# Patient Record
Sex: FEMALE | Race: WHITE | NOT HISPANIC OR LATINO | Employment: OTHER | URBAN - METROPOLITAN AREA
[De-identification: names, ages, dates, MRNs, and addresses within clinical notes are randomized per-mention and may not be internally consistent; named-entity substitution may affect disease eponyms.]

---

## 2017-01-01 ENCOUNTER — TELEPHONE (OUTPATIENT)
Dept: ORTHOPEDIC SURGERY | Facility: CLINIC | Age: 82
End: 2017-01-01

## 2017-01-01 ENCOUNTER — OFFICE VISIT (OUTPATIENT)
Dept: ORTHOPEDIC SURGERY | Facility: CLINIC | Age: 82
End: 2017-01-01

## 2017-01-01 VITALS — TEMPERATURE: 99.2 F | BODY MASS INDEX: 25.13 KG/M2 | WEIGHT: 128 LBS | HEIGHT: 60 IN

## 2017-01-01 VITALS — HEIGHT: 62 IN | BODY MASS INDEX: 23.74 KG/M2 | WEIGHT: 129 LBS

## 2017-01-01 VITALS — BODY MASS INDEX: 23.74 KG/M2 | TEMPERATURE: 99.2 F | HEIGHT: 62 IN | WEIGHT: 129 LBS

## 2017-01-01 DIAGNOSIS — M11.231 CHONDROCALCINOSIS OF RIGHT WRIST: ICD-10-CM

## 2017-01-01 DIAGNOSIS — S42.022D CLOSED DISPLACED FRACTURE OF SHAFT OF LEFT CLAVICLE WITH ROUTINE HEALING, SUBSEQUENT ENCOUNTER: Primary | ICD-10-CM

## 2017-01-01 DIAGNOSIS — S69.91XA HAND INJURY, RIGHT, INITIAL ENCOUNTER: Primary | ICD-10-CM

## 2017-01-01 DIAGNOSIS — S49.92XA INJURY OF LEFT CLAVICLE, INITIAL ENCOUNTER: Primary | ICD-10-CM

## 2017-01-01 PROCEDURE — 20605 DRAIN/INJ JOINT/BURSA W/O US: CPT | Performed by: ORTHOPAEDIC SURGERY

## 2017-01-01 PROCEDURE — 73130 X-RAY EXAM OF HAND: CPT | Performed by: ORTHOPAEDIC SURGERY

## 2017-01-01 PROCEDURE — 99213 OFFICE O/P EST LOW 20 MIN: CPT | Performed by: ORTHOPAEDIC SURGERY

## 2017-01-01 PROCEDURE — 73000 X-RAY EXAM OF COLLAR BONE: CPT | Performed by: ORTHOPAEDIC SURGERY

## 2017-01-01 PROCEDURE — 99214 OFFICE O/P EST MOD 30 MIN: CPT | Performed by: ORTHOPAEDIC SURGERY

## 2017-01-01 PROCEDURE — 23500 CLTX CLAVICULAR FX W/O MNPJ: CPT | Performed by: ORTHOPAEDIC SURGERY

## 2017-01-01 PROCEDURE — 99212 OFFICE O/P EST SF 10 MIN: CPT | Performed by: ORTHOPAEDIC SURGERY

## 2017-01-01 RX ORDER — METHYLPREDNISOLONE 4 MG/1
TABLET ORAL
Qty: 21 TABLET | Refills: 0 | Status: ON HOLD | OUTPATIENT
Start: 2017-01-01 | End: 2018-01-01

## 2017-01-01 RX ORDER — LIDOCAINE HYDROCHLORIDE 10 MG/ML
1 INJECTION, SOLUTION INFILTRATION; PERINEURAL
Status: COMPLETED | OUTPATIENT
Start: 2017-01-01 | End: 2017-01-01

## 2017-01-01 RX ORDER — BUPIVACAINE HYDROCHLORIDE 5 MG/ML
1 INJECTION, SOLUTION PERINEURAL
Status: COMPLETED | OUTPATIENT
Start: 2017-01-01 | End: 2017-01-01

## 2017-01-01 RX ORDER — METHYLPREDNISOLONE ACETATE 80 MG/ML
80 INJECTION, SUSPENSION INTRA-ARTICULAR; INTRALESIONAL; INTRAMUSCULAR; SOFT TISSUE
Status: COMPLETED | OUTPATIENT
Start: 2017-01-01 | End: 2017-01-01

## 2017-01-01 RX ORDER — METHYLPREDNISOLONE 4 MG/1
TABLET ORAL
Qty: 21 TABLET | Refills: 0 | Status: SHIPPED | OUTPATIENT
Start: 2017-01-01 | End: 2017-01-01 | Stop reason: SDUPTHER

## 2017-01-01 RX ADMIN — METHYLPREDNISOLONE ACETATE 80 MG: 80 INJECTION, SUSPENSION INTRA-ARTICULAR; INTRALESIONAL; INTRAMUSCULAR; SOFT TISSUE at 14:21

## 2017-01-01 RX ADMIN — BUPIVACAINE HYDROCHLORIDE 1 ML: 5 INJECTION, SOLUTION PERINEURAL at 14:21

## 2017-01-01 RX ADMIN — LIDOCAINE HYDROCHLORIDE 1 ML: 10 INJECTION, SOLUTION INFILTRATION; PERINEURAL at 14:21

## 2017-02-13 ENCOUNTER — TRANSCRIBE ORDERS (OUTPATIENT)
Dept: ADMINISTRATIVE | Facility: HOSPITAL | Age: 82
End: 2017-02-13

## 2017-02-13 DIAGNOSIS — R01.1 SYSTOLIC MURMUR: Primary | ICD-10-CM

## 2017-03-15 ENCOUNTER — HOSPITAL ENCOUNTER (OUTPATIENT)
Dept: CARDIOLOGY | Facility: HOSPITAL | Age: 82
Discharge: HOME | End: 2017-03-15
Attending: INTERNAL MEDICINE | Admitting: INTERNAL MEDICINE

## 2017-03-15 VITALS
BODY MASS INDEX: 24.84 KG/M2 | SYSTOLIC BLOOD PRESSURE: 170 MMHG | WEIGHT: 135 LBS | DIASTOLIC BLOOD PRESSURE: 90 MMHG | HEART RATE: 71 BPM | HEIGHT: 62 IN

## 2017-03-15 DIAGNOSIS — R01.1 SYSTOLIC MURMUR: ICD-10-CM

## 2017-03-15 LAB
ASCENDING AORTA: 2.7 CM
BH CV ECHO MEAS - ACS: 1.8 CM
BH CV ECHO MEAS - AO MAX PG (FULL): 1.8 MMHG
BH CV ECHO MEAS - AO MAX PG: 7.7 MMHG
BH CV ECHO MEAS - AO MEAN PG (FULL): 0.96 MMHG
BH CV ECHO MEAS - AO MEAN PG: 4.3 MMHG
BH CV ECHO MEAS - AO ROOT AREA (BSA CORRECTED): 1.8
BH CV ECHO MEAS - AO ROOT AREA: 6.9 CM^2
BH CV ECHO MEAS - AO ROOT DIAM: 3 CM
BH CV ECHO MEAS - AO V2 MAX: 139 CM/SEC
BH CV ECHO MEAS - AO V2 MEAN: 96.5 CM/SEC
BH CV ECHO MEAS - AO V2 VTI: 27.8 CM
BH CV ECHO MEAS - AVA(I,A): 2.1 CM^2
BH CV ECHO MEAS - AVA(I,D): 2.1 CM^2
BH CV ECHO MEAS - AVA(V,A): 2.2 CM^2
BH CV ECHO MEAS - AVA(V,D): 2.2 CM^2
BH CV ECHO MEAS - BSA(HAYCOCK): 1.6 M^2
BH CV ECHO MEAS - BSA: 1.6 M^2
BH CV ECHO MEAS - BZI_BMI: 24.7 KILOGRAMS/M^2
BH CV ECHO MEAS - BZI_METRIC_HEIGHT: 157.5 CM
BH CV ECHO MEAS - BZI_METRIC_WEIGHT: 61.2 KG
BH CV ECHO MEAS - CONTRAST EF (2CH): 65.7 ML/M^2
BH CV ECHO MEAS - CONTRAST EF 4CH: 62.9 ML/M^2
BH CV ECHO MEAS - EDV(MOD-SP2): 70 ML
BH CV ECHO MEAS - EDV(MOD-SP4): 62 ML
BH CV ECHO MEAS - EDV(TEICH): 92.9 ML
BH CV ECHO MEAS - EF(CUBED): 71.4 %
BH CV ECHO MEAS - EF(MOD-SP2): 65.7 %
BH CV ECHO MEAS - EF(MOD-SP4): 62.9 %
BH CV ECHO MEAS - EF(TEICH): 63.2 %
BH CV ECHO MEAS - ESV(MOD-SP2): 24 ML
BH CV ECHO MEAS - ESV(MOD-SP4): 23 ML
BH CV ECHO MEAS - ESV(TEICH): 34.2 ML
BH CV ECHO MEAS - FS: 34.1 %
BH CV ECHO MEAS - IVS/LVPW: 1
BH CV ECHO MEAS - IVSD: 0.97 CM
BH CV ECHO MEAS - LAT PEAK E' VEL: 5 CM/SEC
BH CV ECHO MEAS - LV DIASTOLIC VOL/BSA (35-75): 38.3 ML/M^2
BH CV ECHO MEAS - LV MASS(C)D: 143.1 GRAMS
BH CV ECHO MEAS - LV MASS(C)DI: 88.5 GRAMS/M^2
BH CV ECHO MEAS - LV MAX PG: 5.9 MMHG
BH CV ECHO MEAS - LV MEAN PG: 3.3 MMHG
BH CV ECHO MEAS - LV SYSTOLIC VOL/BSA (12-30): 14.2 ML/M^2
BH CV ECHO MEAS - LV V1 MAX: 121.7 CM/SEC
BH CV ECHO MEAS - LV V1 MEAN: 88.1 CM/SEC
BH CV ECHO MEAS - LV V1 VTI: 23.3 CM
BH CV ECHO MEAS - LVIDD: 4.5 CM
BH CV ECHO MEAS - LVIDS: 3 CM
BH CV ECHO MEAS - LVLD AP2: 7.4 CM
BH CV ECHO MEAS - LVLD AP4: 6.3 CM
BH CV ECHO MEAS - LVLS AP2: 5.7 CM
BH CV ECHO MEAS - LVLS AP4: 5.3 CM
BH CV ECHO MEAS - LVOT AREA (M): 2.5 CM^2
BH CV ECHO MEAS - LVOT AREA: 2.5 CM^2
BH CV ECHO MEAS - LVOT DIAM: 1.8 CM
BH CV ECHO MEAS - LVPWD: 0.93 CM
BH CV ECHO MEAS - MED PEAK E' VEL: 5 CM/SEC
BH CV ECHO MEAS - MR MAX PG: 39.9 MMHG
BH CV ECHO MEAS - MR MAX VEL: 315.8 CM/SEC
BH CV ECHO MEAS - MV A DUR: 0.11 SEC
BH CV ECHO MEAS - MV A MAX VEL: 109.1 CM/SEC
BH CV ECHO MEAS - MV DEC SLOPE: 208.4 CM/SEC^2
BH CV ECHO MEAS - MV DEC TIME: 0.25 SEC
BH CV ECHO MEAS - MV E MAX VEL: 57.7 CM/SEC
BH CV ECHO MEAS - MV E/A: 0.53
BH CV ECHO MEAS - MV MAX PG: 7.1 MMHG
BH CV ECHO MEAS - MV MEAN PG: 2 MMHG
BH CV ECHO MEAS - MV P1/2T MAX VEL: 59.2 CM/SEC
BH CV ECHO MEAS - MV P1/2T: 83.1 MSEC
BH CV ECHO MEAS - MV V2 MAX: 133.6 CM/SEC
BH CV ECHO MEAS - MV V2 MEAN: 61.5 CM/SEC
BH CV ECHO MEAS - MV V2 VTI: 28.6 CM
BH CV ECHO MEAS - MVA P1/2T LCG: 3.7 CM^2
BH CV ECHO MEAS - MVA(P1/2T): 2.6 CM^2
BH CV ECHO MEAS - MVA(VTI): 2 CM^2
BH CV ECHO MEAS - PA ACC TIME: 0.14 SEC
BH CV ECHO MEAS - PA MAX PG (FULL): 1.4 MMHG
BH CV ECHO MEAS - PA MAX PG: 4.7 MMHG
BH CV ECHO MEAS - PA PR(ACCEL): 17.2 MMHG
BH CV ECHO MEAS - PA V2 MAX: 108.6 CM/SEC
BH CV ECHO MEAS - PULM A REVS DUR: 0.09 SEC
BH CV ECHO MEAS - PULM A REVS VEL: 42.2 CM/SEC
BH CV ECHO MEAS - PULM DIAS VEL: 38.3 CM/SEC
BH CV ECHO MEAS - PULM S/D: 1.2
BH CV ECHO MEAS - PULM SYS VEL: 44.1 CM/SEC
BH CV ECHO MEAS - PVA(V,A): 2.4 CM^2
BH CV ECHO MEAS - PVA(V,D): 2.4 CM^2
BH CV ECHO MEAS - QP/QS: 0.9
BH CV ECHO MEAS - RAP SYSTOLE: 3 MMHG
BH CV ECHO MEAS - RV MAX PG: 3.3 MMHG
BH CV ECHO MEAS - RV MEAN PG: 1.9 MMHG
BH CV ECHO MEAS - RV V1 MAX: 91.2 CM/SEC
BH CV ECHO MEAS - RV V1 MEAN: 65.7 CM/SEC
BH CV ECHO MEAS - RV V1 VTI: 18.5 CM
BH CV ECHO MEAS - RVOT AREA: 2.8 CM^2
BH CV ECHO MEAS - RVOT DIAM: 1.9 CM
BH CV ECHO MEAS - RVSP: 28 MMHG
BH CV ECHO MEAS - SI(AO): 118.4 ML/M^2
BH CV ECHO MEAS - SI(CUBED): 40.5 ML/M^2
BH CV ECHO MEAS - SI(LVOT): 35.6 ML/M^2
BH CV ECHO MEAS - SI(MOD-SP2): 28.4 ML/M^2
BH CV ECHO MEAS - SI(MOD-SP4): 24.1 ML/M^2
BH CV ECHO MEAS - SI(TEICH): 36.3 ML/M^2
BH CV ECHO MEAS - SUP REN AO DIAM: 1.7 CM
BH CV ECHO MEAS - SV(AO): 191.6 ML
BH CV ECHO MEAS - SV(CUBED): 65.5 ML
BH CV ECHO MEAS - SV(LVOT): 57.5 ML
BH CV ECHO MEAS - SV(MOD-SP2): 46 ML
BH CV ECHO MEAS - SV(MOD-SP4): 39 ML
BH CV ECHO MEAS - SV(RVOT): 52.1 ML
BH CV ECHO MEAS - SV(TEICH): 58.8 ML
BH CV ECHO MEAS - TAPSE (>1.6): 2.2 CM2
BH CV ECHO MEAS - TR MAX VEL: 251.1 CM/SEC
BH CV XLRA - RV BASE: 2.4 CM
BH CV XLRA - TDI S': 23 CM/SEC
E/E' RATIO: 11.5
LEFT ATRIUM VOLUME INDEX: 17 ML/M2
LV EF 2D ECHO EST: 63 %
SINUS: 2.8 CM
STJ: 2.7 CM

## 2017-03-15 PROCEDURE — 93306 TTE W/DOPPLER COMPLETE: CPT

## 2017-03-15 PROCEDURE — 93306 TTE W/DOPPLER COMPLETE: CPT | Performed by: INTERNAL MEDICINE

## 2017-03-22 ENCOUNTER — HOSPITAL ENCOUNTER (OUTPATIENT)
Facility: HOSPITAL | Age: 82
Setting detail: OBSERVATION
LOS: 1 days | Discharge: HOME | End: 2017-03-24
Attending: EMERGENCY MEDICINE | Admitting: INTERNAL MEDICINE

## 2017-03-22 ENCOUNTER — APPOINTMENT (OUTPATIENT)
Dept: GENERAL RADIOLOGY | Facility: HOSPITAL | Age: 82
End: 2017-03-22

## 2017-03-22 ENCOUNTER — APPOINTMENT (OUTPATIENT)
Dept: CT IMAGING | Facility: HOSPITAL | Age: 82
End: 2017-03-22

## 2017-03-22 DIAGNOSIS — S72.145A CLOSED NONDISPLACED INTERTROCHANTERIC FRACTURE OF LEFT FEMUR, INITIAL ENCOUNTER (HCC): ICD-10-CM

## 2017-03-22 DIAGNOSIS — S32.592A CLOSED FRACTURE OF MULTIPLE PUBIC RAMI, LEFT, INITIAL ENCOUNTER (HCC): Primary | ICD-10-CM

## 2017-03-22 PROBLEM — T40.605A OPIATES AND RELATED NARCOTICS CAUSING ADVERSE EFFECT: Status: ACTIVE | Noted: 2017-03-22
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
## 2017-03-22 PROBLEM — Z79.01 CHRONIC ANTICOAGULATION: Status: ACTIVE | Noted: 2017-03-22

## 2017-03-22 PROBLEM — Z86.718 HISTORY OF DVT (DEEP VEIN THROMBOSIS): Status: ACTIVE | Noted: 2017-03-22

## 2017-03-22 LAB
ABO GROUP BLD: NORMAL
ALBUMIN SERPL-MCNC: 3.9 G/DL (ref 3.5–5.2)
ALBUMIN/GLOB SERPL: 1.6 G/DL
ALP SERPL-CCNC: 103 U/L (ref 39–117)
ALT SERPL W P-5'-P-CCNC: 20 U/L (ref 1–33)
ANION GAP SERPL CALCULATED.3IONS-SCNC: 15.7 MMOL/L
AST SERPL-CCNC: 22 U/L (ref 1–32)
BASOPHILS # BLD AUTO: 0.02 10*3/MM3 (ref 0–0.2)
BASOPHILS NFR BLD AUTO: 0.3 % (ref 0–1.5)
BILIRUB SERPL-MCNC: <0.2 MG/DL (ref 0.1–1.2)
BILIRUB UR QL STRIP: NEGATIVE
BLD GP AB SCN SERPL QL: NEGATIVE
BUN BLD-MCNC: 15 MG/DL (ref 8–23)
BUN/CREAT SERPL: 23.1 (ref 7–25)
CALCIUM SPEC-SCNC: 9.2 MG/DL (ref 8.6–10.5)
CHLORIDE SERPL-SCNC: 102 MMOL/L (ref 98–107)
CLARITY UR: ABNORMAL
CO2 SERPL-SCNC: 26.3 MMOL/L (ref 22–29)
COLOR UR: YELLOW
CREAT BLD-MCNC: 0.65 MG/DL (ref 0.57–1)
DEPRECATED RDW RBC AUTO: 58.2 FL (ref 37–54)
EOSINOPHIL # BLD AUTO: 0.08 10*3/MM3 (ref 0–0.7)
EOSINOPHIL NFR BLD AUTO: 1 % (ref 0.3–6.2)
ERYTHROCYTE [DISTWIDTH] IN BLOOD BY AUTOMATED COUNT: 17.2 % (ref 11.7–13)
GFR SERPL CREATININE-BSD FRML MDRD: 86 ML/MIN/1.73
GLOBULIN UR ELPH-MCNC: 2.4 GM/DL
GLUCOSE BLD-MCNC: 130 MG/DL (ref 65–99)
GLUCOSE UR STRIP-MCNC: NEGATIVE MG/DL
HCT VFR BLD AUTO: 39 % (ref 35.6–45.5)
HGB BLD-MCNC: 12.6 G/DL (ref 11.9–15.5)
HGB UR QL STRIP.AUTO: NEGATIVE
HOLD SPECIMEN: NORMAL
HOLD SPECIMEN: NORMAL
IMM GRANULOCYTES # BLD: 0 10*3/MM3 (ref 0–0.03)
IMM GRANULOCYTES NFR BLD: 0 % (ref 0–0.5)
INR PPP: 2.61 (ref 0.9–1.1)
KETONES UR QL STRIP: NEGATIVE
LEUKOCYTE ESTERASE UR QL STRIP.AUTO: NEGATIVE
LYMPHOCYTES # BLD AUTO: 1.54 10*3/MM3 (ref 0.9–4.8)
LYMPHOCYTES NFR BLD AUTO: 19.3 % (ref 19.6–45.3)
MCH RBC QN AUTO: 29.9 PG (ref 26.9–32)
MCHC RBC AUTO-ENTMCNC: 32.3 G/DL (ref 32.4–36.3)
MCV RBC AUTO: 92.6 FL (ref 80.5–98.2)
MONOCYTES # BLD AUTO: 0.89 10*3/MM3 (ref 0.2–1.2)
MONOCYTES NFR BLD AUTO: 11.2 % (ref 5–12)
NEUTROPHILS # BLD AUTO: 5.43 10*3/MM3 (ref 1.9–8.1)
NEUTROPHILS NFR BLD AUTO: 68.2 % (ref 42.7–76)
NITRITE UR QL STRIP: NEGATIVE
PH UR STRIP.AUTO: 8 [PH] (ref 5–8)
PLATELET # BLD AUTO: 457 10*3/MM3 (ref 140–500)
PMV BLD AUTO: 9.6 FL (ref 6–12)
POTASSIUM BLD-SCNC: 3.9 MMOL/L (ref 3.5–5.2)
PROT SERPL-MCNC: 6.3 G/DL (ref 6–8.5)
PROT UR QL STRIP: NEGATIVE
PROTHROMBIN TIME: 27.1 SECONDS (ref 11.7–14.2)
RBC # BLD AUTO: 4.21 10*6/MM3 (ref 3.9–5.2)
RH BLD: POSITIVE
SODIUM BLD-SCNC: 144 MMOL/L (ref 136–145)
SP GR UR STRIP: 1.01 (ref 1–1.03)
UROBILINOGEN UR QL STRIP: ABNORMAL
WBC NRBC COR # BLD: 7.96 10*3/MM3 (ref 4.5–10.7)
WHOLE BLOOD HOLD SPECIMEN: NORMAL
WHOLE BLOOD HOLD SPECIMEN: NORMAL

## 2017-03-22 PROCEDURE — 73502 X-RAY EXAM HIP UNI 2-3 VIEWS: CPT

## 2017-03-22 PROCEDURE — 81003 URINALYSIS AUTO W/O SCOPE: CPT | Performed by: INTERNAL MEDICINE

## 2017-03-22 PROCEDURE — 51702 INSERT TEMP BLADDER CATH: CPT

## 2017-03-22 PROCEDURE — 80053 COMPREHEN METABOLIC PANEL: CPT | Performed by: EMERGENCY MEDICINE

## 2017-03-22 PROCEDURE — 93005 ELECTROCARDIOGRAM TRACING: CPT | Performed by: INTERNAL MEDICINE

## 2017-03-22 PROCEDURE — 25010000002 VITAMIN K1 1 MG/1 ML SOLUTION: Performed by: ORTHOPAEDIC SURGERY

## 2017-03-22 PROCEDURE — 99283 EMERGENCY DEPT VISIT LOW MDM: CPT

## 2017-03-22 PROCEDURE — 94799 UNLISTED PULMONARY SVC/PX: CPT

## 2017-03-22 PROCEDURE — 86900 BLOOD TYPING SEROLOGIC ABO: CPT | Performed by: ORTHOPAEDIC SURGERY

## 2017-03-22 PROCEDURE — 93010 ELECTROCARDIOGRAM REPORT: CPT | Performed by: INTERNAL MEDICINE

## 2017-03-22 PROCEDURE — 85025 COMPLETE CBC W/AUTO DIFF WBC: CPT | Performed by: EMERGENCY MEDICINE

## 2017-03-22 PROCEDURE — 36415 COLL VENOUS BLD VENIPUNCTURE: CPT | Performed by: EMERGENCY MEDICINE

## 2017-03-22 PROCEDURE — 70450 CT HEAD/BRAIN W/O DYE: CPT

## 2017-03-22 PROCEDURE — 86901 BLOOD TYPING SEROLOGIC RH(D): CPT | Performed by: ORTHOPAEDIC SURGERY

## 2017-03-22 PROCEDURE — 86850 RBC ANTIBODY SCREEN: CPT | Performed by: ORTHOPAEDIC SURGERY

## 2017-03-22 PROCEDURE — 85610 PROTHROMBIN TIME: CPT | Performed by: EMERGENCY MEDICINE

## 2017-03-22 PROCEDURE — 71010 HC CHEST PA OR AP: CPT

## 2017-03-22 PROCEDURE — G0378 HOSPITAL OBSERVATION PER HR: HCPCS

## 2017-03-22 RX ORDER — PHYTONADIONE 2 MG/ML
5 INJECTION, EMULSION INTRAMUSCULAR; INTRAVENOUS; SUBCUTANEOUS ONCE
Status: COMPLETED | OUTPATIENT
Start: 2017-03-22 | End: 2017-03-22

## 2017-03-22 RX ORDER — LANOLIN ALCOHOL/MO/W.PET/CERES
500 CREAM (GRAM) TOPICAL DAILY
Status: DISCONTINUED | OUTPATIENT
Start: 2017-03-23 | End: 2017-03-24 | Stop reason: HOSPADM

## 2017-03-22 RX ORDER — FLUOXETINE HYDROCHLORIDE 20 MG/1
20 CAPSULE ORAL 3 TIMES WEEKLY
Status: DISCONTINUED | OUTPATIENT
Start: 2017-03-23 | End: 2017-03-24 | Stop reason: HOSPADM

## 2017-03-22 RX ORDER — ONDANSETRON 2 MG/ML
4 INJECTION INTRAMUSCULAR; INTRAVENOUS EVERY 6 HOURS PRN
Status: DISCONTINUED | OUTPATIENT
Start: 2017-03-22 | End: 2017-03-24 | Stop reason: HOSPADM

## 2017-03-22 RX ORDER — TRIAMCINOLONE ACETONIDE 0.1 %
PASTE (GRAM) DENTAL 2 TIMES DAILY
Status: DISCONTINUED | OUTPATIENT
Start: 2017-03-22 | End: 2017-03-24 | Stop reason: HOSPADM

## 2017-03-22 RX ORDER — HYDROCODONE BITARTRATE AND ACETAMINOPHEN 7.5; 325 MG/1; MG/1
1 TABLET ORAL EVERY 6 HOURS PRN
Status: DISCONTINUED | OUTPATIENT
Start: 2017-03-22 | End: 2017-03-22

## 2017-03-22 RX ORDER — TRAMADOL HYDROCHLORIDE 50 MG/1
50 TABLET ORAL EVERY 6 HOURS PRN
Status: DISCONTINUED | OUTPATIENT
Start: 2017-03-22 | End: 2017-03-24 | Stop reason: HOSPADM

## 2017-03-22 RX ORDER — BUPROPION HYDROCHLORIDE 75 MG/1
75 TABLET ORAL 3 TIMES DAILY
Status: DISCONTINUED | OUTPATIENT
Start: 2017-03-22 | End: 2017-03-24 | Stop reason: HOSPADM

## 2017-03-22 RX ORDER — HYDROCODONE BITARTRATE AND ACETAMINOPHEN 5; 325 MG/1; MG/1
1 TABLET ORAL EVERY 6 HOURS PRN
Status: DISCONTINUED | OUTPATIENT
Start: 2017-03-22 | End: 2017-03-24 | Stop reason: HOSPADM

## 2017-03-22 RX ORDER — SODIUM CHLORIDE 0.9 % (FLUSH) 0.9 %
1-10 SYRINGE (ML) INJECTION AS NEEDED
Status: DISCONTINUED | OUTPATIENT
Start: 2017-03-22 | End: 2017-03-24 | Stop reason: HOSPADM

## 2017-03-22 RX ORDER — BUPROPION HYDROCHLORIDE 75 MG/1
75 TABLET ORAL 3 TIMES DAILY
COMMUNITY

## 2017-03-22 RX ORDER — OLANZAPINE 5 MG/1
5 TABLET ORAL NIGHTLY
Status: DISCONTINUED | OUTPATIENT
Start: 2017-03-22 | End: 2017-03-24 | Stop reason: HOSPADM

## 2017-03-22 RX ORDER — MORPHINE SULFATE 2 MG/ML
2 INJECTION, SOLUTION INTRAMUSCULAR; INTRAVENOUS
Status: DISCONTINUED | OUTPATIENT
Start: 2017-03-22 | End: 2017-03-24 | Stop reason: HOSPADM

## 2017-03-22 RX ORDER — MULTIPLE VITAMINS W/ MINERALS TAB 9MG-400MCG
1 TAB ORAL DAILY
Status: DISCONTINUED | OUTPATIENT
Start: 2017-03-22 | End: 2017-03-24 | Stop reason: HOSPADM

## 2017-03-22 RX ORDER — LORAZEPAM 0.5 MG/1
0.5 TABLET ORAL EVERY 8 HOURS PRN
Status: DISCONTINUED | OUTPATIENT
Start: 2017-03-22 | End: 2017-03-24 | Stop reason: HOSPADM

## 2017-03-22 RX ORDER — WARFARIN SODIUM 7.5 MG/1
7.5 TABLET ORAL
Status: ON HOLD | COMMUNITY
End: 2018-01-01

## 2017-03-22 RX ORDER — ACETAMINOPHEN 325 MG/1
650 TABLET ORAL EVERY 6 HOURS PRN
Status: DISCONTINUED | OUTPATIENT
Start: 2017-03-22 | End: 2017-03-24 | Stop reason: HOSPADM

## 2017-03-22 RX ADMIN — OLANZAPINE 5 MG: 5 TABLET, FILM COATED ORAL at 21:09

## 2017-03-22 RX ADMIN — LORAZEPAM 0.5 MG: 0.5 TABLET ORAL at 21:09

## 2017-03-22 RX ADMIN — TRAMADOL HYDROCHLORIDE 50 MG: 50 TABLET, FILM COATED ORAL at 23:03

## 2017-03-22 RX ADMIN — PHYTONADIONE 5 MG: 1 INJECTION, EMULSION INTRAMUSCULAR; INTRAVENOUS; SUBCUTANEOUS at 15:13

## 2017-03-22 RX ADMIN — MULTIPLE VITAMINS W/ MINERALS TAB 1 TABLET: TAB at 21:08

## 2017-03-22 NOTE — ED NOTES
Pt c/o right groin pain after falling around 1700 yesterday. Pt said she was walking inside of her house and just fell. Pt does not know why she fell. Pt states she did not pass out. Pt states she did hit her head but denies LOC.     Freida Lezama RN  03/22/17 7420

## 2017-03-22 NOTE — H&P
Patient Care Team:  Jerome Moore Jr., MD as PCP - General (Internal Medicine)    Chief complaint: left hip pain    Subjective     Fall     Hip Pain    Incident onset: last night. The incident occurred at home. The injury mechanism was a fall. The pain is present in the left hip. The pain is moderate. Associated symptoms include an inability to bear weight and a loss of motion. The symptoms are aggravated by weight bearing and movement. She has tried rest for the symptoms. The treatment provided mild relief.   Came to [facility] ER and found to have hip fx and has been admitted.     Review of Systems   Constitutional: Negative.    HENT: Negative.    Eyes: Negative.    Respiratory: Negative.    Cardiovascular: Negative for chest pain, palpitations and leg swelling.   Gastrointestinal: Negative.    Endocrine: Negative.    Genitourinary: Negative.    Musculoskeletal: Positive for arthralgias and joint swelling.   Skin: Negative.    Allergic/Immunologic: Negative.    Neurological: Negative.    Hematological: Negative.    Psychiatric/Behavioral: Negative.     Ext- no leg swelling, + history of blood clots    Past Medical History:   Diagnosis Date   • Arthritis of back    • Arthritis of spine    • Back pain    • Depression    • Hx of blood clots     2 seperate episoides. Last one over 20 years ago. Also s/p IVC filter     Past Surgical History:   Procedure Laterality Date   • BREAST SURGERY     • CATARACT EXTRACTION     • GALLBLADDER SURGERY     • HYSTERECTOMY     • JOINT REPLACEMENT  2014    LEFT   • KNEE ARTHROPLASTY, PARTIAL REPLACEMENT     • KYPHOPLASTY N/A 4/25/2016    Procedure: L1 kyphoplasty;  Surgeon: Jd Ibrahim MD;  Location: [facility] 18/19;  Service:    • VENA CAVA FILTER INSERTION       Family History   Problem Relation Age of Onset   • No Known Problems Mother    • Deep vein thrombosis Neg Hx      Social History   Substance Use Topics   • Smoking status: Never Smoker   • Smokeless tobacco:  Never Used   • Alcohol use No     Prescriptions Prior to Admission   Medication Sig Dispense Refill Last Dose   • Acetaminophen (TYLENOL PO) Take  by mouth.   Taking   • buPROPion (WELLBUTRIN) 75 MG tablet Take 75 mg by mouth 3 (Three) Times a Day.      • Cholecalciferol (VITAMIN D PO) Take  by mouth.   Taking   • FLUoxetine (PROzac) 20 MG tablet Take 20 mg by mouth daily. TAKES 3DAYS WEEKLY(MWF)   4/24/2016 at 0900   • furosemide (LASIX) 20 MG tablet Take 20 mg by mouth daily.   Taking   • L-Methylfolate-Q45-F0-A4 (CEREFOLIN PO) Take 1 tablet by mouth daily.   Taking   • LORazepam (ATIVAN) 0.5 MG tablet Take 0.5 mg by mouth every 8 (eight) hours as needed for anxiety.   Taking   • Multiple Vitamins-Minerals (CENTRUM ADULTS PO) Take 1 tablet by mouth daily.   Taking   • NIACIN CR PO Take 1 tablet by mouth 3 (three) times a day before meals.   Taking   • OLANZapine (ZYPREXA) 5 MG tablet Take 5 mg by mouth every night.   Taking   • triamcinolone (KENALOG) 0.1 % paste Apply sparingly to the gums 2 times a day. 5 g 2    • warfarin (COUMADIN) 5 MG tablet Take 5 mg by mouth Daily. On Tues, Wed, Thurs, Fri, Sat, & Sun   Taking   • warfarin (COUMADIN) 7.5 MG tablet Take 7.5 mg by mouth Daily. On Monday only      • buPROPion SR (WELLBUTRIN SR) 150 MG 12 hr tablet Take 150 mg by mouth 2 (two) times a day.   Taking     Allergies:  Review of patient's allergies indicates no known allergies.    Objective      Vital Signs  Temp:  [98.7 °F (37.1 °C)] 98.7 °F (37.1 °C)  Heart Rate:  [59-65] 61  Resp:  [16] 16  BP: (140-177)/(68-77) 177/77    Physical Exam   Constitutional: She is oriented to person, place, and time. She appears well-developed and well-nourished. No distress.   HENT:   Head: Normocephalic and atraumatic.   Mouth/Throat: Oropharynx is clear and moist.   Eyes: EOM are normal. Pupils are equal, round, and reactive to light. No scleral icterus.   Neck: Normal range of motion. Neck supple.   Cardiovascular: Normal rate,  regular rhythm and normal heart sounds.    No murmur heard.  Pulmonary/Chest: Effort normal and breath sounds normal.   Abdominal: Soft. Bowel sounds are normal. She exhibits no distension. There is no tenderness.   Genitourinary:   Genitourinary Comments: Deferred    Musculoskeletal: She exhibits deformity (left leg externally rotated and shortened). She exhibits no edema.   Neurological: She is alert and oriented to person, place, and time. No cranial nerve deficit.   Skin: Skin is warm and dry. No rash noted.   Psychiatric: She has a normal mood and affect. Her behavior is normal. Thought content normal.   Nursing note and vitals reviewed.      Results Review:   I reviewed the patient's new clinical results.    Urinalysis With / Culture If Indicated [[id number]]         Lab Status: No result Specimen: Urine from Urine, Clean Catch        Type & Screen [[id number]] Collected: 03/22/17 1431       Lab Status: Edited Result - FINAL Specimen: Blood Updated: 03/22/17 1534        ABO Type O        RH type Positive        Antibody Screen Negative       Protime-INR [[id number]] (Abnormal) Collected: 03/22/17 1030       Lab Status: Final result Specimen: Blood Updated: 03/22/17 1103        Protime 27.1 (H) Seconds         INR 2.61 (H)       [place] Draw [[id number]] Collected: 03/22/17 1030       Lab Status: Final result Specimen: Blood Updated: 03/22/17 1501       Narrative:         The following orders were created for panel order [place] Draw.  Procedure                               Abnormality         Status                     ---------                               -----------         ------                     Light Blue Top[[id number]]                                    Final result               Green Top (Gel)[[id number]]                                   Final result               Lavender Top[[id number]]                                      Final result               Gold Top - SST[[id number]]                                     Final result                 Please view results for these tests on the individual orders.       Comprehensive Metabolic Panel [[id number]] (Abnormal) Collected: 03/22/17 1030       Lab Status: Final result Specimen: Blood Updated: 03/22/17 1240        Glucose 130 (H) mg/dL         BUN 15 mg/dL         Creatinine 0.65 mg/dL         Sodium 144 mmol/L         Potassium 3.9 mmol/L         Chloride 102 mmol/L         CO2 26.3 mmol/L         Calcium 9.2 mg/dL         Total Protein 6.3 g/dL         Albumin 3.90 g/dL         ALT (SGPT) 20 U/L         AST (SGOT) 22 U/L         Alkaline Phosphatase 103 U/L         Total Bilirubin <0.2 mg/dL         eGFR Non African Amer 86 mL/min/1.73         Globulin 2.4 gm/dL         A/G Ratio 1.6 g/dL         BUN/Creatinine Ratio 23.1        Anion Gap 15.7 mmol/L        Narrative:         The MDRD GFR formula is only valid for adults with stable renal function between ages 18 and 70.       CBC Auto Differential [[id number]] (Abnormal) Collected: 03/22/17 1030       Lab Status: Final result Specimen: Blood Updated: 03/22/17 1220        WBC 7.96 10*3/mm3         RBC 4.21 10*6/mm3         Hemoglobin 12.6 g/dL         Hematocrit 39.0 %         MCV 92.6 fL         MCH 29.9 pg         MCHC 32.3 (L) g/dL         RDW 17.2 (H) %         RDW-SD 58.2 (H) fl         MPV 9.6 fL         Platelets 457 10*3/mm3         Neutrophil % 68.2 %         Lymphocyte % 19.3 (L) %         Monocyte % 11.2 %         Eosinophil % 1.0 %         Basophil % 0.3 %         Immature Grans % 0.0 %         Neutrophils, Absolute 5.43 10*3/mm3         Lymphocytes, Absolute 1.54 10*3/mm3         Monocytes, Absolute 0.89 10*3/mm3         Eosinophils, Absolute 0.08 10*3/mm3         Basophils, Absolute 0.02 10*3/mm3         Immature Grans, Absolute 0.00 10*3/mm3             XR Chest 1 View [[id number]] Not Reviewed       Order Status: Completed Collected: 03/22/17 1352        Updated: 03/22/17 1701       Narrative:         XR CHEST 1  VW-, XR HIP W OR WO PELVIS 2-3 VIEW LEFT-     HISTORY: 86-year-old female status post fall. Left hip pain. Chest pain.     FINDINGS:   LEFT HIP: There is a subtle linear lucency in the intertrochanteric  region of the left femur which likely represents a nondisplaced  fracture. No acute pelvic fracture is seen. There are moderately  extensive osteoarthritic changes at both hip joints.     CHEST: There are no pulmonary airspace consolidations to suggest  pneumonia and there is no convincing evidence for CHF.         CT Head Without Contrast [[id number]] Not Reviewed        Order Status: Completed Collected: 03/22/17 1142        Updated: 03/22/17 1341       Narrative:         EMERGENCY NONCONTRAST HEAD CT 03/22/2017     CLINICAL HISTORY: Patient fell yesterday hit back of head, head injury  and now has headaches.     TECHNIQUE: Spiral CT images were obtained from the base of the skull to  the vertex without intravenous contrast. Images were reformatted and are  submitted in 3 mm thick axial CT sections with brain algorithm and 2 mm  thick axial CT sections with high resolution bone algorithm.     COMPARISON: This is correlated to a noncontrast head CT from [facility] 12/14/2014.     FINDINGS: Patchy and confluent areas of low density in the  periventricular extending to the subcortical white matter of the  cerebral hemispheres, consistent with moderate small vessel disease. The  remainder of the brain parenchyma is normal in attenuation. The  ventricles are upper limits of normal in size and I see no mass effect,  no midline shift, and no extraaxial fluid collections are identified.  There is no evidence of acute intracranial hemorrhage. No acute skull  fracture is identified. The paranasal sinuses and the mastoid air cells  and middle ear cavities are clear.           Impression:            1. There is moderate small vessel disease in the cerebral white matter,  mild diffuse cerebral atrophy, most  pronounced in the frontotemporal  region. There are scattered calcified plaques in the cavernous segments  of the internal carotid arteries.     2. The remainder of the head CT is within normal limits with no acute  skull fracture or intracranial hemorrhage identified.             Assessment/Plan     Principal Problem:    Closed nondisplaced intertrochanteric fracture of left femur  Active Problems:    History of DVT (deep vein thrombosis)    Chronic anticoagulation    Opiates and related narcotics causing adverse effect    Fall    -Consult Dr. Meek. NPO aftermidnight for likely OR  -On coumadin for history of DVT but last one was over 20 yrs ago and also already has IVC filter  -Given 5mg Vitamin K. Recheck INR in AM and may need additional Vitamin K or FFP depending on level  -Probably will need to bridge with lovenox (even proph dose) as may take awhile for INR to go up after VitK  -Continue majority of home meds  -IV narcotics and other medications for pain. Has had confusion in the past but still may need pain meds, RN can use lowest potency possible.  -Place jeffries and check U/A    Pt without any history of CAD, never gets chest pain with exertion  EKG showing sinus, LVH, no evidence for ischemia. Recent ECHO normal except Diastolic dysfunction.   Ok to precede to OR for Urgent surgery without further cardiac testing      Assessment & Plan    I discussed the patients findings and my recommendations with patient, family and nursing staff  And ER physician. Reviewed previous records  Stan Walsh MD  03/22/17  6:27 PM

## 2017-03-22 NOTE — PLAN OF CARE
Problem: Fall Risk (Adult)  Goal: Absence of Falls  Outcome: Ongoing (interventions implemented as appropriate)  Goal: Identify Related Risk Factors and Signs and Symptoms  Outcome: Ongoing (interventions implemented as appropriate)  Goal: Absence of Falls  Outcome: Ongoing (interventions implemented as appropriate)    Problem: Pain, Acute (Adult)  Goal: Identify Related Risk Factors and Signs and Symptoms  Outcome: Ongoing (interventions implemented as appropriate)  Goal: Acceptable Pain Control/Comfort Level  Outcome: Ongoing (interventions implemented as appropriate)    03/22/17 1748   Pain, Acute (Adult)   Acceptable Pain Control/Comfort Level achieves outcome

## 2017-03-22 NOTE — ED NOTES
Attempt to call report unsuccessful. Floor RN to call back.     Maribell Anderson, TONY  03/22/17 9408

## 2017-03-22 NOTE — ED PROVIDER NOTES
" EMERGENCY DEPARTMENT ENCOUNTER    CHIEF COMPLAINT  Chief Complaint: fall  History given by: patient, family  History limited by: nothing  Room Number: 21/21  PMD: Jerome Moore Jr., MD  Ortho- Dr. Rudolph Meek    HPI:  Pt is a 86 y.o. female who presents complaining of left anterior hip pain s/p fall last night while getting out of her chair. Pt states that she was able to pull herself up onto a couch after the fall. Pt also complains of a blow to the head but denies LOC, HA, N/V/D, blurred vision, abd pain, back pain or neck pain. Pt states that she has been unable to bear weight on her left leg since the fall and describes the pain as a pulled muscle. Pt is on coumadin for history of DVTs and ambulates with a walker at baseline.    Duration:  One day  Onset: sudden  Timing: constant  Location: left anterior hip  Radiation: none  Quality: \"feels like a pulled muscle\"  Intensity/Severity: moderate  Progression: unchanged  Associated Symptoms: blow to the head, unable to bear weight  Aggravating Factors: movement, ambulation  Alleviating Factors: none  Previous Episodes: none  Treatment before arrival: none    PAST MEDICAL HISTORY  Active Ambulatory Problems     Diagnosis Date Noted   • Lumbar compression fracture 04/19/2016     Resolved Ambulatory Problems     Diagnosis Date Noted   • No Resolved Ambulatory Problems     Past Medical History:   Diagnosis Date   • Arthritis of back    • Arthritis of spine    • Back pain    • Depression    • Hx of blood clots        PAST SURGICAL HISTORY  Past Surgical History:   Procedure Laterality Date   • BREAST SURGERY     • CATARACT EXTRACTION     • GALLBLADDER SURGERY     • HYSTERECTOMY     • JOINT REPLACEMENT  2014    LEFT   • KNEE ARTHROPLASTY, PARTIAL REPLACEMENT     • KYPHOPLASTY N/A 4/25/2016    Procedure: L1 kyphoplasty;  Surgeon: Jd Ibrahim MD;  Location: [facility] OR 18/19;  Service:        FAMILY HISTORY  Family History   Problem Relation Age of Onset "   • No Known Problems Mother        SOCIAL HISTORY  Social History     Social History   • Marital status:      Spouse name: N/A   • Number of children: N/A   • Years of education: N/A     Occupational History   • Not on file.     Social History Main Topics   • Smoking status: Never Smoker   • Smokeless tobacco: Never Used   • Alcohol use No   • Drug use: Defer   • Sexual activity: Defer     Other Topics Concern   • Not on file     Social History Narrative       ALLERGIES  Review of patient's allergies indicates no known allergies.    REVIEW OF SYSTEMS  Review of Systems   Constitutional: Negative for fever.   HENT: Negative for sore throat.    Eyes: Negative.    Respiratory: Negative for cough and shortness of breath.    Cardiovascular: Negative for chest pain.   Gastrointestinal: Negative for abdominal pain, diarrhea and vomiting.   Genitourinary: Negative for dysuria.   Musculoskeletal: Positive for arthralgias (L hip) and gait problem (unable to bear weight on left leg). Negative for neck pain.   Skin: Negative for rash.   Allergic/Immunologic: Negative.    Neurological: Negative for weakness, numbness and headaches.   Hematological: Negative.    Psychiatric/Behavioral: Negative.    All other systems reviewed and are negative.      PHYSICAL EXAM  ED Triage Vitals   Temp Heart Rate Resp BP SpO2   03/22/17 1008 03/22/17 1008 03/22/17 1012 03/22/17 1012 03/22/17 1008   98.7 °F (37.1 °C) 65 16 163/71 97 %      Temp src Heart Rate Source Patient Position BP Location FiO2 (%)   03/22/17 1008 03/22/17 1012 03/22/17 1012 03/22/17 1012 --   Tympanic Monitor Sitting Right arm        Physical Exam   Constitutional: She is oriented to person, place, and time. She appears distressed (mild).   HENT:   Head: Normocephalic and atraumatic.   Eyes: EOM are normal. Pupils are equal, round, and reactive to light.   Neck: Normal range of motion. Neck supple.   Cardiovascular: Normal rate, regular rhythm and normal heart  sounds.    No murmur heard.  Pulmonary/Chest: Effort normal and breath sounds normal. No respiratory distress.   Abdominal: Soft. There is no tenderness. There is no rebound and no guarding.   Musculoskeletal: Normal range of motion. She exhibits no edema.        Left hip: She exhibits tenderness (mild, medial hip). She exhibits normal range of motion.        Cervical back: She exhibits no tenderness.        Thoracic back: She exhibits no tenderness.        Lumbar back: She exhibits no tenderness.   Left leg is not shortened or externally rotated. There is pain with flexion and extension of the left hip   Neurological: She is alert and oriented to person, place, and time. She has normal sensation and normal strength.   Skin: Skin is warm and dry. No rash noted.   Psychiatric: Mood and affect normal.   Nursing note and vitals reviewed.      LAB RESULTS  Lab Results (last 24 hours)     Procedure Component Value Units Date/Time    Protime-INR [[id number]]  (Abnormal) Collected:  03/22/17 1030    Specimen:  Blood Updated:  03/22/17 1103     Protime 27.1 (H) Seconds      INR 2.61 (H)    CBC & Differential [[id number]] Collected:  03/22/17 1030    Specimen:  Blood Updated:  03/22/17 1220    Narrative:       The following orders were created for panel order CBC & Differential.  Procedure                               Abnormality         Status                     ---------                               -----------         ------                     CBC Auto Differential[[id number]]         Abnormal            Final result                 Please view results for these tests on the individual orders.    Comprehensive Metabolic Panel [[id number]]  (Abnormal) Collected:  03/22/17 1030    Specimen:  Blood Updated:  03/22/17 1240     Glucose 130 (H) mg/dL      BUN 15 mg/dL      Creatinine 0.65 mg/dL      Sodium 144 mmol/L      Potassium 3.9 mmol/L      Chloride 102 mmol/L      CO2 26.3 mmol/L      Calcium 9.2 mg/dL      Total Protein  6.3 g/dL      Albumin 3.90 g/dL      ALT (SGPT) 20 U/L      AST (SGOT) 22 U/L      Alkaline Phosphatase 103 U/L      Total Bilirubin <0.2 mg/dL      eGFR Non African Amer 86 mL/min/1.73      Globulin 2.4 gm/dL      A/G Ratio 1.6 g/dL      BUN/Creatinine Ratio 23.1     Anion Gap 15.7 mmol/L     Narrative:       The MDRD GFR formula is only valid for adults with stable renal function between ages 18 and 70.    CBC Auto Differential [[id number]]  (Abnormal) Collected:  03/22/17 1030    Specimen:  Blood Updated:  03/22/17 1220     WBC 7.96 10*3/mm3      RBC 4.21 10*6/mm3      Hemoglobin 12.6 g/dL      Hematocrit 39.0 %      MCV 92.6 fL      MCH 29.9 pg      MCHC 32.3 (L) g/dL      RDW 17.2 (H) %      RDW-SD 58.2 (H) fl      MPV 9.6 fL      Platelets 457 10*3/mm3      Neutrophil % 68.2 %      Lymphocyte % 19.3 (L) %      Monocyte % 11.2 %      Eosinophil % 1.0 %      Basophil % 0.3 %      Immature Grans % 0.0 %      Neutrophils, Absolute 5.43 10*3/mm3      Lymphocytes, Absolute 1.54 10*3/mm3      Monocytes, Absolute 0.89 10*3/mm3      Eosinophils, Absolute 0.08 10*3/mm3      Basophils, Absolute 0.02 10*3/mm3      Immature Grans, Absolute 0.00 10*3/mm3           I ordered the above labs and reviewed the results    RADIOLOGY  CT Head Without Contrast   Preliminary Result       1. There is moderate small vessel disease in the cerebral white matter,   mild diffuse cerebral atrophy, most pronounced in the frontotemporal   region. There are scattered calcified plaques in the cavernous segments   of the internal carotid arteries.       2. The remainder of the head CT is within normal limits with no acute   skull fracture or intracranial hemorrhage identified.        The results were communicated to Tereso Joe, the physician assistant in   the ER taking care of the patient by telephone 03/22/2017 at 11:10 a.m.           XR Hip With or Without Pelvis 2 - 3 View Left    (Results Pending)   XR Chest 1 View    (Results Pending)       1311- Reviewed pt's L hip XR, which shows a subtle, non-displaced left intertrochanteric fracture. Pt's CXR shows nothing acute. CT head shows chronic changes but nothing acute. Independently viewed by me. Interpreted by radiologist.     I ordered the above noted radiological studies. Interpreted by radiologist.  Reviewed by me in PACS.       PROCEDURES  Procedures      PROGRESS AND CONSULTS  ED Course     1139- Pt refuses pain medication at this time.    1200- Ordered blood work and CXR for further evaluation.     1312- Placed call to [facility] for admission and to Dr. Meek (ortho) for consult.    1313- Rechecked pt. Pt is resting comfortably. Notified pt and family of the pt's imaging results, including the subtle intertrochanteric left hip fracture. Discussed the plan to admit the pt for surgical consult. Pt and family agree with the plan and all questions were addressed.    1:13 PM  Latest vital signs   BP- 140/68 HR- 60 Temp- 98.7 °F (37.1 °C) (Tympanic) O2 sat- 94%    1328- Discussed the pt's case with OR technician for Dr. Meek (ortho) who states that they will notify Dr. Meek of the consult.    1330- Discussed the pt's case with Dr. Walsh ([facility]) who agrees to admit the pt to a Med/Surg bed.     MEDICAL DECISION MAKING  Results were reviewed/discussed with the patient and they were also made aware of online access. Pt also made aware that some labs, such as cultures, will not be resulted during ER visit and follow up with PMD is necessary.     MDM  Number of Diagnoses or Management Options  Closed nondisplaced intertrochanteric fracture of left femur, initial encounter:      Amount and/or Complexity of Data Reviewed  Clinical lab tests: ordered and reviewed (INR=2.61)  Tests in the radiology section of CPT®: ordered and reviewed (L hip XR shows a subtle, non-displaced left intertrochanteric fracture)  Obtain history from someone other than the patient: yes (family)  Discuss the patient with other providers:  yes (D/w Dr. Walsh ([facility]))  Independent visualization of images, tracings, or specimens: yes    Patient Progress  Patient progress: stable         DIAGNOSIS  Final diagnoses:   Closed nondisplaced intertrochanteric fracture of left femur, initial encounter       DISPOSITION  ADMISSION    Discussed treatment plan and reason for admission with pt/family and admitting physician.  Pt/family voiced understanding of the plan for admission for further testing/treatment as needed.     Latest Documented Vital Signs:  As of 1:32 PM  BP- 140/68 HR- 60 Temp- 98.7 °F (37.1 °C) (Tympanic) O2 sat- 94%    --  Documentation assistance provided by nadira Clifton for Dr. Almanza.  Information recorded by the scribe was done at my direction and has been verified and validated by me.     Dana Clifton  03/22/17 1332       Brandan Almanza MD  03/22/17 2024

## 2017-03-23 ENCOUNTER — APPOINTMENT (OUTPATIENT)
Dept: MRI IMAGING | Facility: HOSPITAL | Age: 82
End: 2017-03-23
Attending: ORTHOPAEDIC SURGERY

## 2017-03-23 LAB
INR PPP: 1.54 (ref 0.9–1.1)
PROTHROMBIN TIME: 17.9 SECONDS (ref 11.7–14.2)

## 2017-03-23 PROCEDURE — 99221 1ST HOSP IP/OBS SF/LOW 40: CPT | Performed by: ORTHOPAEDIC SURGERY

## 2017-03-23 PROCEDURE — G0378 HOSPITAL OBSERVATION PER HR: HCPCS

## 2017-03-23 PROCEDURE — 25010000002 ENOXAPARIN PER 10 MG: Performed by: INTERNAL MEDICINE

## 2017-03-23 PROCEDURE — 73721 MRI JNT OF LWR EXTRE W/O DYE: CPT

## 2017-03-23 PROCEDURE — 85610 PROTHROMBIN TIME: CPT | Performed by: ORTHOPAEDIC SURGERY

## 2017-03-23 PROCEDURE — 94799 UNLISTED PULMONARY SVC/PX: CPT

## 2017-03-23 PROCEDURE — 97162 PT EVAL MOD COMPLEX 30 MIN: CPT

## 2017-03-23 PROCEDURE — 97110 THERAPEUTIC EXERCISES: CPT

## 2017-03-23 PROCEDURE — G8978 MOBILITY CURRENT STATUS: HCPCS

## 2017-03-23 PROCEDURE — G8979 MOBILITY GOAL STATUS: HCPCS

## 2017-03-23 PROCEDURE — 96372 THER/PROPH/DIAG INJ SC/IM: CPT

## 2017-03-23 RX ORDER — WARFARIN SODIUM 5 MG/1
5 TABLET ORAL
Status: DISCONTINUED | OUTPATIENT
Start: 2017-03-23 | End: 2017-03-23

## 2017-03-23 RX ORDER — WARFARIN SODIUM 5 MG/1
5 TABLET ORAL
Status: DISCONTINUED | OUTPATIENT
Start: 2017-03-23 | End: 2017-03-24 | Stop reason: HOSPADM

## 2017-03-23 RX ORDER — WARFARIN SODIUM 7.5 MG/1
7.5 TABLET ORAL
Status: DISCONTINUED | OUTPATIENT
Start: 2017-03-23 | End: 2017-03-23

## 2017-03-23 RX ORDER — CEFAZOLIN SODIUM 2 G/100ML
2 INJECTION, SOLUTION INTRAVENOUS ONCE
Status: DISCONTINUED | OUTPATIENT
Start: 2017-03-23 | End: 2017-03-23

## 2017-03-23 RX ADMIN — BUPROPION HYDROCHLORIDE 75 MG: 75 TABLET, FILM COATED ORAL at 20:45

## 2017-03-23 RX ADMIN — WARFARIN SODIUM 5 MG: 5 TABLET ORAL at 17:58

## 2017-03-23 RX ADMIN — ENOXAPARIN SODIUM 60 MG: 60 INJECTION SUBCUTANEOUS at 17:58

## 2017-03-23 RX ADMIN — HYDROCODONE BITARTRATE AND ACETAMINOPHEN 1 TABLET: 5; 325 TABLET ORAL at 20:43

## 2017-03-23 RX ADMIN — HYDROCODONE BITARTRATE AND ACETAMINOPHEN 1 TABLET: 5; 325 TABLET ORAL at 11:49

## 2017-03-23 RX ADMIN — BUPROPION HYDROCHLORIDE 75 MG: 75 TABLET, FILM COATED ORAL at 17:58

## 2017-03-23 RX ADMIN — LORAZEPAM 0.5 MG: 0.5 TABLET ORAL at 11:49

## 2017-03-23 RX ADMIN — OLANZAPINE 5 MG: 5 TABLET, FILM COATED ORAL at 20:45

## 2017-03-23 NOTE — CONSULTS
Orthopedic Consult      Patient: Britany Pruitt    Date of Admission: 3/22/2017 11:04 AM    YOB: 1930    Medical Record Number: [id number]    Attending Physician: João Sanchez MD    Consulting Physician: João Sanchez MD      Chief Complaints: Left hip pain      History of Present Illness: 86 y.o. female admitted to [facility] to services of João Sanchez MD with left hip pain.  He fell at home yesterday.  She lives in an independent care apartment larger facility.  She complains of pain in the left hip no other complaints no back pain.  She is on anticoagulants.      Allergies: No Known Allergies    Medications:   Home Medications:  No current facility-administered medications on file prior to encounter.      Current Outpatient Prescriptions on File Prior to Encounter   Medication Sig   • Acetaminophen (TYLENOL PO) Take  by mouth.   • Cholecalciferol (VITAMIN D PO) Take  by mouth.   • FLUoxetine (PROzac) 20 MG tablet Take 20 mg by mouth daily. TAKES 3DAYS WEEKLY(MWF)   • furosemide (LASIX) 20 MG tablet Take 20 mg by mouth daily.   • L-Methylfolate-J46-[id number]-J1 (CEREFOLIN PO) Take 1 tablet by mouth daily.   • LORazepam (ATIVAN) 0.5 MG tablet Take 0.5 mg by mouth every 8 (eight) hours as needed for anxiety.   • Multiple Vitamins-Minerals (CENTRUM ADULTS PO) Take 1 tablet by mouth daily.   • NIACIN CR PO Take 1 tablet by mouth 3 (three) times a day before meals.   • OLANZapine (ZYPREXA) 5 MG tablet Take 5 mg by mouth every night.   • triamcinolone (KENALOG) 0.1 % paste Apply sparingly to the gums 2 times a day.   • warfarin (COUMADIN) 5 MG tablet Take 5 mg by mouth Daily. On Tues, Wed, Thurs, Fri, Sat, & Sun   • buPROPion SR (WELLBUTRIN SR) 150 MG 12 hr tablet Take 150 mg by mouth 2 (two) times a day.     Current Medications:  Scheduled Meds:  buPROPion 75 mg Oral TID   FLUoxetine 20 mg Oral Once per day on Mon Wed Fri   multivitamin with minerals 1 tablet Oral Daily    niacin 500 mg Oral Daily   OLANZapine 5 mg Oral Nightly   triamcinolone  Mouth/Throat BID     Continuous Infusions:   PRN Meds:.•  acetaminophen  •  HYDROcodone-acetaminophen  •  LORazepam  •  Morphine **OR** Morphine  •  ondansetron  •  sodium chloride  •  traMADol    Past Medical History:   Diagnosis Date   • Arthritis of back    • Arthritis of spine    • Back pain    • Depression    • Hx of blood clots     2 seperate episoides. Last one over 20 years ago. Also s/p IVC filter     Past Surgical History:   Procedure Laterality Date   • BREAST SURGERY     • CATARACT EXTRACTION     • GALLBLADDER SURGERY     • HYSTERECTOMY     • JOINT REPLACEMENT  2014    LEFT   • KNEE ARTHROPLASTY, PARTIAL REPLACEMENT     • KYPHOPLASTY N/A 4/25/2016    Procedure: L1 kyphoplasty;  Surgeon: Jd Ibrahim MD;  Location: [facility] 18/19;  Service:    • VENA CAVA FILTER INSERTION       Social History     Occupational History   • Not on file.     Social History Main Topics   • Smoking status: Never Smoker   • Smokeless tobacco: Never Used   • Alcohol use No   • Drug use: Defer   • Sexual activity: Defer    Social History     Social History Narrative     Family History   Problem Relation Age of Onset   • No Known Problems Mother    • Deep vein thrombosis Neg Hx          Review of Systems:     Constitutional:  Denies fever, shaking or chills   Eyes:  Denies change in visual acuity   HEENT:  Denies nasal congestion or sore throat   Respiratory:  Denies cough or shortness of breath   Cardiovascular:  Denies chest pain or edema  Endocrine: Denies tremors, palpitations, intolerance of heat or cold, polyuria, polydipsia.  GI:  Denies abdominal pain, nausea, vomiting, bloody stools or diarrhea  :  Denies frequency, urgency, incontinence, retention, or nocturia.  Musculoskeletal:  Denies numbness tingling or loss of motor function except as above  Integument:  Denies rash, lesion or ulceration   Neurologic:  Denies headache or focal  weakness, deficits  Heme:  Denies epistaxis, spontaneous or excessive bleeding, epistaxis, hematuria, melena, fatigue, enlarged or tender lymph nodes.      All other pertinent positives and negatives as noted above in HPI.    Physical Exam: 86 y.o. female    General:  Awake, alert. No acute distress.      Head/Neck:  Normocephalic, atraumatic.  Conjunctiva and sclera clear.  Hearing adequate for the exam.  Neck is supple with normal ROM.    Psych:  Affect and demeanor appropriate.    CV:  Regular rate and rhythm.  Hemodynamically stable.    Lungs:  Good chest expansion, breathing unlabored.    Abdomen:  Soft.  Non-tender, non-distended.    Extremities:        Skin appears benign without obvious lacerations, ulcerations or lesions.  NO gross deformity of malalignment noted.  Compartments soft without evidence for DVT or compartment syndrome.  No atrophy.  NO palpable masses or adenopathy.  Focal TTP over left hip.  ROM limited from pain.   No obvious instability although exam is limited due to discomfort.  Strength well-preserved distally.  Sensation to light touch grossly intact distally.  Good skin turgor, brisk cap refill and good pulses distally.    All other extremities atraumatic without gross abnormality.       Diagnostic Tests:    Admission on 03/22/2017   Component Date Value Ref Range Status   • Protime 03/22/2017 27.1* 11.7 - 14.2 Seconds Final   • INR 03/22/2017 2.61* 0.90 - 1.10 Final   • Extra Tube 03/22/2017 hold for add-on   Final    Auto resulted   • Extra Tube 03/22/2017 Hold for add-ons.   Final    Auto resulted.   • Extra Tube 03/22/2017 hold for add-on   Final    Auto resulted   • Extra Tube 03/22/2017 Hold for add-ons.   Final    Auto resulted.   • Glucose 03/22/2017 130* 65 - 99 mg/dL Final   • BUN 03/22/2017 15  8 - 23 mg/dL Final   • Creatinine 03/22/2017 0.65  0.57 - 1.00 mg/dL Final   • Sodium 03/22/2017 144  136 - 145 mmol/L Final   • Potassium 03/22/2017 3.9  3.5 - 5.2 mmol/L Final   •  Chloride 03/22/2017 102  98 - 107 mmol/L Final   • CO2 03/22/2017 26.3  22.0 - 29.0 mmol/L Final   • Calcium 03/22/2017 9.2  8.6 - 10.5 mg/dL Final   • Total Protein 03/22/2017 6.3  6.0 - 8.5 g/dL Final   • Albumin 03/22/2017 3.90  3.50 - 5.20 g/dL Final   • ALT (SGPT) 03/22/2017 20  1 - 33 U/L Final   • AST (SGOT) 03/22/2017 22  1 - 32 U/L Final   • Alkaline Phosphatase 03/22/2017 103  39 - 117 U/L Final   • Total Bilirubin 03/22/2017 <0.2  0.1 - 1.2 mg/dL Final   • eGFR Non African Amer 03/22/2017 86  >60 mL/min/1.73 Final   • Globulin 03/22/2017 2.4  gm/dL Final   • A/G Ratio 03/22/2017 1.6  g/dL Final   • BUN/Creatinine Ratio 03/22/2017 23.1  7.0 - 25.0 Final   • Anion Gap 03/22/2017 15.7  mmol/L Final   • WBC 03/22/2017 7.96  4.50 - 10.70 10*3/mm3 Final   • RBC 03/22/2017 4.21  3.90 - 5.20 10*6/mm3 Final   • Hemoglobin 03/22/2017 12.6  11.9 - 15.5 g/dL Final   • Hematocrit 03/22/2017 39.0  35.6 - 45.5 % Final   • MCV 03/22/2017 92.6  80.5 - 98.2 fL Final   • MCH 03/22/2017 29.9  26.9 - 32.0 pg Final   • MCHC 03/22/2017 32.3* 32.4 - 36.3 g/dL Final   • RDW 03/22/2017 17.2* 11.7 - 13.0 % Final   • RDW-SD 03/22/2017 58.2* 37.0 - 54.0 fl Final   • MPV 03/22/2017 9.6  6.0 - 12.0 fL Final   • Platelets 03/22/2017 457  140 - 500 10*3/mm3 Final   • Neutrophil % 03/22/2017 68.2  42.7 - 76.0 % Final   • Lymphocyte % 03/22/2017 19.3* 19.6 - 45.3 % Final   • Monocyte % 03/22/2017 11.2  5.0 - 12.0 % Final   • Eosinophil % 03/22/2017 1.0  0.3 - 6.2 % Final   • Basophil % 03/22/2017 0.3  0.0 - 1.5 % Final   • Immature Grans % 03/22/2017 0.0  0.0 - 0.5 % Final   • Neutrophils, Absolute 03/22/2017 5.43  1.90 - 8.10 10*3/mm3 Final   • Lymphocytes, Absolute 03/22/2017 1.54  0.90 - 4.80 10*3/mm3 Final   • Monocytes, Absolute 03/22/2017 0.89  0.20 - 1.20 10*3/mm3 Final   • Eosinophils, Absolute 03/22/2017 0.08  0.00 - 0.70 10*3/mm3 Final   • Basophils, Absolute 03/22/2017 0.02  0.00 - 0.20 10*3/mm3 Final   • Immature Grans,  Absolute 03/22/2017 0.00  0.00 - 0.03 10*3/mm3 Final   • ABO Type 03/22/2017 O   Final   • RH type 03/22/2017 Positive   Final   • Antibody Screen 03/22/2017 Negative   Final   • Color, UA 03/22/2017 Yellow  Yellow, Straw Final   • Appearance, UA 03/22/2017 Cloudy* Clear Final   • pH, UA 03/22/2017 8.0  5.0 - 8.0 Final   • Specific Gravity, UA 03/22/2017 1.012  1.005 - 1.030 Final   • Glucose, UA 03/22/2017 Negative  Negative Final   • Ketones, UA 03/22/2017 Negative  Negative Final   • Bilirubin, UA 03/22/2017 Negative  Negative Final   • Blood, UA 03/22/2017 Negative  Negative Final   • Protein, UA 03/22/2017 Negative  Negative Final   • Leuk Esterase, UA 03/22/2017 Negative  Negative Final   • Nitrite, UA 03/22/2017 Negative  Negative Final   • Urobilinogen, UA 03/22/2017 0.2 E.U./dL  0.2 - 1.0 E.U./dL Final   • Protime 03/23/2017 17.9* 11.7 - 14.2 Seconds Final   • INR 03/23/2017 1.54* 0.90 - 1.10 Final     Lab Results (last 24 hours)     Procedure Component Value Units Date/Time    Protime-INR [[id number]]  (Abnormal) Collected:  03/22/17 1030    Specimen:  Blood Updated:  03/22/17 1103     Protime 27.1 (H) Seconds      INR 2.61 (H)    CBC & Differential [[id number]] Collected:  03/22/17 1030    Specimen:  Blood Updated:  03/22/17 1220    Narrative:       The following orders were created for panel order CBC & Differential.  Procedure                               Abnormality         Status                     ---------                               -----------         ------                     CBC Auto Differential[[id number]]         Abnormal            Final result                 Please view results for these tests on the individual orders.    CBC Auto Differential [[id number]]  (Abnormal) Collected:  03/22/17 1030    Specimen:  Blood Updated:  03/22/17 1220     WBC 7.96 10*3/mm3      RBC 4.21 10*6/mm3      Hemoglobin 12.6 g/dL      Hematocrit 39.0 %      MCV 92.6 fL      MCH 29.9 pg      MCHC 32.3 (L) g/dL       RDW 17.2 (H) %      RDW-SD 58.2 (H) fl      MPV 9.6 fL      Platelets 457 10*3/mm3      Neutrophil % 68.2 %      Lymphocyte % 19.3 (L) %      Monocyte % 11.2 %      Eosinophil % 1.0 %      Basophil % 0.3 %      Immature Grans % 0.0 %      Neutrophils, Absolute 5.43 10*3/mm3      Lymphocytes, Absolute 1.54 10*3/mm3      Monocytes, Absolute 0.89 10*3/mm3      Eosinophils, Absolute 0.08 10*3/mm3      Basophils, Absolute 0.02 10*3/mm3      Immature Grans, Absolute 0.00 10*3/mm3     Comprehensive Metabolic Panel [[id number]]  (Abnormal) Collected:  03/22/17 1030    Specimen:  Blood Updated:  03/22/17 1240     Glucose 130 (H) mg/dL      BUN 15 mg/dL      Creatinine 0.65 mg/dL      Sodium 144 mmol/L      Potassium 3.9 mmol/L      Chloride 102 mmol/L      CO2 26.3 mmol/L      Calcium 9.2 mg/dL      Total Protein 6.3 g/dL      Albumin 3.90 g/dL      ALT (SGPT) 20 U/L      AST (SGOT) 22 U/L      Alkaline Phosphatase 103 U/L      Total Bilirubin <0.2 mg/dL      eGFR Non African Amer 86 mL/min/1.73      Globulin 2.4 gm/dL      A/G Ratio 1.6 g/dL      BUN/Creatinine Ratio 23.1     Anion Gap 15.7 mmol/L     Narrative:       The MDRD GFR formula is only valid for adults with stable renal function between ages 18 and 70.    Green Top (Gel) [[id number]] Collected:  03/22/17 1030    Specimen:  Blood from Arm, Right Updated:  03/22/17 1501     Extra Tube Hold for add-ons.      Auto resulted.       Lavender Top [[id number]] Collected:  03/22/17 1030    Specimen:  Blood Updated:  03/22/17 1501     Extra Tube hold for add-on      Auto resulted       Gold Top - SST [[id number]] Collected:  03/22/17 1030    Specimen:  Blood Updated:  03/22/17 1501     Extra Tube Hold for add-ons.      Auto resulted.       [place] Draw [[id number]] Collected:  03/22/17 1030    Specimen:  Blood Updated:  03/22/17 1501    Narrative:       The following orders were created for panel order [place] Draw.  Procedure                               Abnormality         Status                      ---------                               -----------         ------                     Light Blue Top[[id number]]                                    Final result               Green Top (Gel)[[id number]]                                   Final result               Lavender Top[[id number]]                                      Final result               Gold Top - SST[[id number]]                                    Final result                 Please view results for these tests on the individual orders.    Light Blue Top [[id number]] Collected:  03/22/17 1030    Specimen:  Blood Updated:  03/22/17 1501     Extra Tube hold for add-on      Auto resulted       Urinalysis With / Culture If Indicated [[id number]]  (Abnormal) Collected:  03/22/17 2052    Specimen:  Urine from Urine, Catheter Updated:  03/22/17 2114     Color, UA Yellow     Appearance, UA Cloudy (A)     pH, UA 8.0     Specific Gravity, UA 1.012     Glucose, UA Negative     Ketones, UA Negative     Bilirubin, UA Negative     Blood, UA Negative     Protein, UA Negative     Leuk Esterase, UA Negative     Nitrite, UA Negative     Urobilinogen, UA 0.2 E.U./dL    Narrative:       Urine microscopic not indicated.    Protime-INR [[id number]]  (Abnormal) Collected:  03/23/17 0438    Specimen:  Blood Updated:  03/23/17 0553     Protime 17.9 (H) Seconds      INR 1.54 (H)          Imaging: Plain film x-rays of the hip suggest a vertically oriented the basicervical/intratrochanteric left hip fracture which is nondisplaced.    Assessment: Left intertrochanteric hip fracture.    Plan:  I have recommended intramedullary fixation.  My partner Dr. Vyas saw her as well, and where that I had already seen her.  He was impressed that she seemed minimally tender to manipulation of the hip, which I did not perform.  Given that the fracture is very subtle it would be reasonable to obtain an MRI scan to make certain that it is indeed an acute fracture, so I have ordered that  study.  Otherwise with her history of DVT it's important week get her up quickly to avoid blood clots as well as other complications of enforced recumbency so that I have recommended the intramedullary fixation of the fracture, so long as the MRI suggest acute fracture.  She is accompanied presently by her daughter-in-law.  Patient seems to understand and the certainly has a grasp on the simple concepts involved but probably need some help with providing informed consent and her daughter will do this later.  Gas a risk and benefits with the patient and her daughter-in-law.  I discussed the risks and benefits of open treatment of the patient's fracture(s).  These include adverse anesthetic events such as death, stroke and myocardial infarction.  This more specific surgical complications include infection, nonunion, hardware failure, vascular injury, neural injury, stiffness, weakness, post-traumatic arthritis, limb length discrepancy, deep venous thrombosis and pulmonary embolism, persistent pain, and need for subsequent surgeries, among others.  The hardware may need to be removed at a later date.  Risks of nonoperative treatment include increased risk of posttraumatic arthritis, stiffness, weakness, limp, limb length discrepancy, deep venous thrombosis and pulmonary embolism, ulcers associated with cast and/or traction treatment, among others.  For weightbearing long bones non-operative treatment may be associated with increased chance of death.  After careful consideration the patient and or power of  wishes to proceed with surgery.    Date: 3/23/2017    Jd Ibrahim MD    CC: João Sanchez MD

## 2017-03-23 NOTE — PLAN OF CARE
Problem: Fall Risk (Adult)  Goal: Absence of Falls  Outcome: Outcome(s) achieved Date Met:  03/23/17  Goal: Identify Related Risk Factors and Signs and Symptoms  Outcome: Ongoing (interventions implemented as appropriate)  Goal: Absence of Falls  Outcome: Outcome(s) achieved Date Met:  03/23/17    Problem: Patient Care Overview (Adult)  Goal: Plan of Care Review  Outcome: Ongoing (interventions implemented as appropriate)    03/23/17 0652   Coping/Psychosocial Response Interventions   Plan Of Care Reviewed With patient   Patient Care Overview   Progress no change   Outcome Evaluation   Outcome Summary/Follow up Plan Surgery today, IV ABX,Up with PT today,Pain meds       Goal: Adult Individualization and Mutuality  Outcome: Ongoing (interventions implemented as appropriate)    Problem: Orthopaedic Fracture (Adult)  Goal: Signs and Symptoms of Listed Potential Problems Will be Absent or Manageable (Orthopaedic Fracture)  Outcome: Ongoing (interventions implemented as appropriate)

## 2017-03-23 NOTE — PLAN OF CARE
Problem: Patient Care Overview (Adult)  Goal: Plan of Care Review  Outcome: Ongoing (interventions implemented as appropriate)    03/23/17 1314   Coping/Psychosocial Response Interventions   Plan Of Care Reviewed With patient   Patient Care Overview   Progress improving   Outcome Evaluation   Outcome Summary/Follow up Plan pain currently controlled       Goal: Adult Individualization and Mutuality  Outcome: Ongoing (interventions implemented as appropriate)    03/23/17 1314   Individualization   Patient Specific Goals pain control   Patient Specific Interventions provide pain meds when needed       Goal: Discharge Needs Assessment  Outcome: Ongoing (interventions implemented as appropriate)    03/23/17 1314   Discharge Needs Assessment   Concerns To Be Addressed no discharge needs identified         Problem: Orthopaedic Fracture (Adult)  Goal: Signs and Symptoms of Listed Potential Problems Will be Absent or Manageable (Orthopaedic Fracture)  Outcome: Ongoing (interventions implemented as appropriate)    03/23/17 1314   Orthopaedic Fracture   Problems Assessed (Orthopaedic Fracture) all   Problems Present (Orthopaedic Fracture) pain

## 2017-03-23 NOTE — PROGRESS NOTES
Acute Care - Physical Therapy Initial Evaluation  [facility]     Patient Name: Britany Pruitt  : 1930  MRN: [id number]  Today's Date: 3/23/2017   Onset of Illness/Injury or Date of Surgery Date: 17            Admit Date: 3/22/2017     Visit Dx:    ICD-10-CM ICD-9-CM   1. Closed nondisplaced intertrochanteric fracture of left femur, initial encounter S72.145A 820.21     Patient Active Problem List   Diagnosis   • Lumbar compression fracture   • Closed nondisplaced intertrochanteric fracture of left femur   • History of DVT (deep vein thrombosis)   • Chronic anticoagulation   • Opiates and related narcotics causing adverse effect   • Fall     Past Medical History:   Diagnosis Date   • Arthritis of back    • Arthritis of spine    • Back pain    • Depression    • Hx of blood clots     2 seperate episoides. Last one over 20 years ago. Also s/p IVC filter     Past Surgical History:   Procedure Laterality Date   • BREAST SURGERY     • CATARACT EXTRACTION     • GALLBLADDER SURGERY     • HYSTERECTOMY     • JOINT REPLACEMENT      LEFT   • KNEE ARTHROPLASTY, PARTIAL REPLACEMENT     • KYPHOPLASTY N/A 2016    Procedure: L1 kyphoplasty;  Surgeon: Jd Ibrahim MD;  Location: [facility] ;  Service:    • VENA CAVA FILTER INSERTION            PT ASSESSMENT (last 72 hours)      PT Evaluation       17 1543 17 0400    Rehab Evaluation    Document Type evaluation  -AR     Subjective Information agree to therapy  -AR     Patient Effort, Rehab Treatment good  -AR     Symptoms Noted During/After Treatment fatigue  -AR     General Information    Patient Profile Review yes  -AR     Onset of Illness/Injury or Date of Surgery Date 17  -AR     Precautions/Limitations fall precautions   WBAT  -AR     Prior Level of Function independent:   reports h/o falls, no use of AD  -AR     Equipment Currently Used at Home none  -AR     Barriers to Rehab none identified  -AR     Living Environment     Lives With alone  -AR     Living Arrangements house  -AR     Home Accessibility no concerns  -AR     Living Environment Comment reports no family or friends that live very close able to help  -AR     Clinical Impression    Patient/Family Goals Statement wants to DC  -AR     Criteria for Skilled Therapeutic Interventions Met yes  -AR     Pathology/Pathophysiology Noted (Describe Specifically for Each System) musculoskeletal  -AR     Impairments Found (describe specific impairments) aerobic capacity/endurance;gait, locomotion, and balance  -AR     Rehab Potential good, to achieve stated therapy goals  -AR     Pain Assessment    Pain Assessment 0-10  -AR     Pain Score 5  -AR     Pain Type Acute pain  -AR     Pain Location Hip  -AR     Pain Orientation Left  -AR     Pain Intervention(s) Repositioned  -AR     Cognitive Assessment/Intervention    Current Cognitive/Communication Assessment impaired  -AR     Orientation Status oriented x 4  -AR     Follows Commands/Answers Questions 100% of the time  -AR     ROM (Range of Motion)    General ROM --   B LE WFL   -AR     MMT (Manual Muscle Testing)    General MMT Assessment --   B LE -4/5  -AR     Muscle Tone Assessment    Muscle Tone Assessment  LLE  -TW    LLE Muscle Tone Assessment  mildly decreased tone  -TW    Bed Mobility, Assessment/Treatment    Bed Mobility, Assistive Device bed rails;head of bed elevated  -AR     Bed Mob, Supine to Sit, [place] supervision required  -AR     Bed Mob, Sit to Supine, [place] not tested  -AR     Transfer Assessment/Treatment    Transfers, Sit-Stand [place] minimum assist (75% patient effort)  -AR     Transfers, Stand-Sit [place] minimum assist (75% patient effort)  -AR     Transfers, Sit-Stand-Sit, Assist Device rolling walker  -AR     Gait Assessment/Treatment    Gait, [place] Level minimum assist (75% patient effort)  -AR     Gait, Assistive Device rolling walker  -AR     Gait, Distance (Feet) 60  -AR     Gait,  Gait Pattern Analysis swing-through gait  -AR     Gait, Gait Deviations antalgic;vonnie decreased;decreased heel strike;forward flexed posture  -AR     Gait, Safety Issues weight-shifting ability decreased;step length decreased  -AR     Gait, Impairments strength decreased;impaired balance  -AR     Therapy Exercises    Bilateral Lower Extremities 10 reps;ankle pumps/circles;heel slides;LAQ  -AR     Positioning and Restraints    Pre-Treatment Position in bed  -AR     Post Treatment Position chair  -AR     In Chair notified nsg;sitting;call light within reach;encouraged to call for assist;exit alarm on  -AR       03/23/17 0000 03/22/17 2000    Living Environment    Type of Financial/Environmental Concern  none  -TT    Muscle Tone Assessment    Muscle Tone Assessment LLE  -TW LLE  -TT (r) TW (t) TT (c)    LLE Muscle Tone Assessment mildly decreased tone  -TW mildly decreased tone  -TT (r) TW (t) TT (c)      03/22/17 1600       General Information    Equipment Currently Used at Home walker, rolling  -CB     Living Environment    Lives With alone  -CB     Living Arrangements house  -CB     Home Accessibility no concerns  -CB     Stair Railings at Home none  -CB     Transportation Available car  -CB       User Key  (r) = Recorded By, (t) = Taken By, (c) = Cosigned By    Initials Name Provider Type    TT Viktoriya Lozoya, RN Registered Nurse    AR Lor Cohen, PT Physical Therapist    CB Noelle Funez RN Registered Nurse    HALLE Pacheco, RN Registered Nurse          Physical Therapy Education     Title: PT OT SLP Therapies (Active)     Topic: Physical Therapy (Active)     Point: Mobility training (Active)    Learning Progress Summary    Learner Readiness Method Response Comment Documented by Status   Patient Acceptance E NR  AR 03/23/17 5030 Active               Point: Home exercise program (Active)    Learning Progress Summary    Learner Readiness Method Response Comment Documented by Status   Patient  Acceptance E NR  AR 03/23/17 1550 Active               Point: Body mechanics (Active)    Learning Progress Summary    Learner Readiness Method Response Comment Documented by Status   Patient Acceptance E NR  AR 03/23/17 1550 Active               Point: Precautions (Active)    Learning Progress Summary    Learner Readiness Method Response Comment Documented by Status   Patient Acceptance E NR  AR 03/23/17 1550 Active                      User Key     Initials Effective Dates Name Provider Type Discipline    AR 06/27/16 -  Lor Cohen PT Physical Therapist PT                PT Recommendation and Plan  Anticipated Discharge Disposition: skilled nursing facility  Planned Therapy Interventions: balance training, bed mobility training, gait training, home exercise program, patient/family education, ROM (Range of Motion), strengthening  PT Frequency: daily  Plan of Care Review  Plan Of Care Reviewed With: patient  Outcome Summary/Follow up Plan: Pt demonstrates impaired strength, gait pattern, balance, and independence w/ mobility; able to ambulate 60' using walker w/ minimal assist.  Recommend DC to [facility].            IP PT Goals       03/23/17 1547          Bed Mobility PT LTG    Bed Mobility PT LTG, Date Established 03/23/17  -AR      Bed Mobility PT LTG, Time to Achieve 1 wk  -AR      Bed Mobility PT LTG, Activity Type supine to sit/sit to supine  -AR      Bed Mobility PT LTG, [place] Level conditional independence  -AR      Transfer Training PT LTG    Transfer Training PT LTG, Date Established 03/23/17  -AR      Transfer Training PT LTG, Time to Achieve 1 wk  -AR      Transfer Training PT LTG, Activity Type sit to stand/stand to sit;bed to chair /chair to bed  -AR      Transfer Training PT LTG, [place] Level supervision required  -AR      Transfer Training PT LTG, Assist Device --   least restrictive AD  -AR      Gait Training PT LTG    Gait Training Goal PT LTG, Date Established 03/23/17  -AR      Gait  Training Goal PT LTG, Time to Achieve 1 wk  -AR      Gait Training Goal PT LTG, [place] Level supervision required  -AR      Gait Training Goal PT LTG, Assist Device --   least restrictive AD  -AR      Gait Training Goal PT LTG, Distance to Achieve 200  -AR        User Key  (r) = Recorded By, (t) = Taken By, (c) = Cosigned By    Initials Name Provider Type    AR Lor Cohen PT Physical Therapist                Outcome Measures       03/23/17 1500          How much help from another person do you currently need...    Turning from your back to your side while in flat bed without using bedrails? 4  -AR      Moving from lying on back to sitting on the side of a flat bed without bedrails? 4  -AR      Moving to and from a bed to a chair (including a wheelchair)? 3  -AR      Standing up from a chair using your arms (e.g., wheelchair, bedside chair)? 3  -AR      Climbing 3-5 steps with a railing? 2  -AR      To walk in hospital room? 3  -AR      AM-PAC 6 Clicks Score 19  -AR      Functional Assessment    Outcome Measure Options AM-PAC 6 Clicks Basic Mobility (PT)  -AR        User Key  (r) = Recorded By, (t) = Taken By, (c) = Cosigned By    Initials Name Provider Type    AR Lor Cohen PT Physical Therapist           Time Calculation:         PT Charges       03/23/17 1551          Time Calculation    Start Time 1520  -AR      Stop Time 1551  -AR      Time Calculation (min) 31 min  -AR      PT Received On 03/23/17  -AR      PT - Next Appointment 03/24/17  -AR      PT Goal Re-Cert Due Date 03/30/17  -AR        User Key  (r) = Recorded By, (t) = Taken By, (c) = Cosigned By    Initials Name Provider Type    AR Lor Cohen PT Physical Therapist          Therapy Charges for Today     Code Description Service Date Service Provider Modifiers Qty    27971085276 HC PT EVAL MOD COMPLEXITY 2 3/23/2017 Lor Cohen, PT GP 1    33371220386 HC PT THER PROC EA 15 MIN 3/23/2017 Lor Cohen PT GP 1          PT  G-Codes  Outcome Measure Options: AM-PAC 6 Clicks Basic Mobility (PT)      Lor Cohen PT  3/23/2017

## 2017-03-23 NOTE — PLAN OF CARE
Problem: Patient Care Overview (Adult)  Goal: Plan of Care Review    03/23/17 1547   Coping/Psychosocial Response Interventions   Plan Of Care Reviewed With patient   Outcome Evaluation   Outcome Summary/Follow up Plan Pt demonstrates impaired strength, gait pattern, balance, and independence w/ mobility; able to ambulate 60' using walker w/ minimal assist. Recommend DC to [facility].          Problem: Inpatient Physical Therapy  Goal: Bed Mobility Goal LTG- PT    03/23/17 1547   Bed Mobility PT LTG   Bed Mobility PT LTG, Date Established 03/23/17   Bed Mobility PT LTG, Time to Achieve 1 wk   Bed Mobility PT LTG, Activity Type supine to sit/sit to supine   Bed Mobility PT LTG, [place] Level conditional independence       Goal: Transfer Training Goal 1 LTG- PT    03/23/17 1547   Transfer Training PT LTG   Transfer Training PT LTG, Date Established 03/23/17   Transfer Training PT LTG, Time to Achieve 1 wk   Transfer Training PT LTG, Activity Type sit to stand/stand to sit;bed to chair /chair to bed   Transfer Training PT LTG, [place] Level supervision required   Transfer Training PT LTG, Assist Device (least restrictive AD)       Goal: Gait Training Goal LTG- PT    03/23/17 1547   Gait Training PT LTG   Gait Training Goal PT LTG, Date Established 03/23/17   Gait Training Goal PT LTG, Time to Achieve 1 wk   Gait Training Goal PT LTG, [place] Level supervision required   Gait Training Goal PT LTG, Assist Device (least restrictive AD)   Gait Training Goal PT LTG, Distance to Achieve 200

## 2017-03-23 NOTE — PROGRESS NOTES
Orthopedic Progress Note      Patient: Britany Pruitt    YOB: 1930    Medical Record Number: [id number]    Attending Physician: João Sanchez MD    Date of Admission: 3/22/2017 11:04 AM    Admitting Dx: Closed nondisplaced intertrochanteric fracture of left femur, initial encounter [S72.145A]    Current Problem List:   Patient Active Problem List   Diagnosis   • Lumbar compression fracture   • Closed nondisplaced intertrochanteric fracture of left femur   • History of DVT (deep vein thrombosis)   • Chronic anticoagulation   • Opiates and related narcotics causing adverse effect   • Fall         Past Medical History:   Diagnosis Date   • Arthritis of back    • Arthritis of spine    • Back pain    • Depression    • Hx of blood clots     2 seperate episoides. Last one over 20 years ago. Also s/p IVC filter       Current Medications:  Scheduled Meds:  buPROPion 75 mg Oral TID   FLUoxetine 20 mg Oral Once per day on Mon Wed Fri   multivitamin with minerals 1 tablet Oral Daily   niacin 500 mg Oral Daily   OLANZapine 5 mg Oral Nightly   triamcinolone  Mouth/Throat BID   warfarin 5 mg Oral Daily     PRN Meds:.•  acetaminophen  •  HYDROcodone-acetaminophen  •  LORazepam  •  Morphine **OR** Morphine  •  ondansetron  •  sodium chloride  •  traMADol    Diagnostic Tests:    Lab Results (last 24 hours)     Procedure Component Value Units Date/Time    CBC & Differential [[id number]] Collected:  03/22/17 1030    Specimen:  Blood Updated:  03/22/17 1220    Narrative:       The following orders were created for panel order CBC & Differential.  Procedure                               Abnormality         Status                     ---------                               -----------         ------                     CBC Auto Differential[[id number]]         Abnormal            Final result                 Please view results for these tests on the individual orders.    CBC Auto Differential [[id number]]   (Abnormal) Collected:  03/22/17 1030    Specimen:  Blood Updated:  03/22/17 1220     WBC 7.96 10*3/mm3      RBC 4.21 10*6/mm3      Hemoglobin 12.6 g/dL      Hematocrit 39.0 %      MCV 92.6 fL      MCH 29.9 pg      MCHC 32.3 (L) g/dL      RDW 17.2 (H) %      RDW-SD 58.2 (H) fl      MPV 9.6 fL      Platelets 457 10*3/mm3      Neutrophil % 68.2 %      Lymphocyte % 19.3 (L) %      Monocyte % 11.2 %      Eosinophil % 1.0 %      Basophil % 0.3 %      Immature Grans % 0.0 %      Neutrophils, Absolute 5.43 10*3/mm3      Lymphocytes, Absolute 1.54 10*3/mm3      Monocytes, Absolute 0.89 10*3/mm3      Eosinophils, Absolute 0.08 10*3/mm3      Basophils, Absolute 0.02 10*3/mm3      Immature Grans, Absolute 0.00 10*3/mm3     Comprehensive Metabolic Panel [[id number]]  (Abnormal) Collected:  03/22/17 1030    Specimen:  Blood Updated:  03/22/17 1240     Glucose 130 (H) mg/dL      BUN 15 mg/dL      Creatinine 0.65 mg/dL      Sodium 144 mmol/L      Potassium 3.9 mmol/L      Chloride 102 mmol/L      CO2 26.3 mmol/L      Calcium 9.2 mg/dL      Total Protein 6.3 g/dL      Albumin 3.90 g/dL      ALT (SGPT) 20 U/L      AST (SGOT) 22 U/L      Alkaline Phosphatase 103 U/L      Total Bilirubin <0.2 mg/dL      eGFR Non African Amer 86 mL/min/1.73      Globulin 2.4 gm/dL      A/G Ratio 1.6 g/dL      BUN/Creatinine Ratio 23.1     Anion Gap 15.7 mmol/L     Narrative:       The MDRD GFR formula is only valid for adults with stable renal function between ages 18 and 70.    Green Top (Gel) [[id number]] Collected:  03/22/17 1030    Specimen:  Blood from Arm, Right Updated:  03/22/17 1501     Extra Tube Hold for add-ons.      Auto resulted.       Lavender Top [[id number]] Collected:  03/22/17 1030    Specimen:  Blood Updated:  03/22/17 1501     Extra Tube hold for add-on      Auto resulted       Gold Top - SST [[id number]] Collected:  03/22/17 1030    Specimen:  Blood Updated:  03/22/17 1501     Extra Tube Hold for add-ons.      Auto resulted.       [place]  Draw [[id number]] Collected:  03/22/17 1030    Specimen:  Blood Updated:  03/22/17 1501    Narrative:       The following orders were created for panel order [place] Draw.  Procedure                               Abnormality         Status                     ---------                               -----------         ------                     Light Blue Top[[id number]]                                    Final result               Green Top (Gel)[[id number]]                                   Final result               Lavender Top[[id number]]                                      Final result               Gold Top - SST[[id number]]                                    Final result                 Please view results for these tests on the individual orders.    Light Blue Top [[id number]] Collected:  03/22/17 1030    Specimen:  Blood Updated:  03/22/17 1501     Extra Tube hold for add-on      Auto resulted       Urinalysis With / Culture If Indicated [[id number]]  (Abnormal) Collected:  03/22/17 2052    Specimen:  Urine from Urine, Catheter Updated:  03/22/17 2114     Color, UA Yellow     Appearance, UA Cloudy (A)     pH, UA 8.0     Specific Gravity, UA 1.012     Glucose, UA Negative     Ketones, UA Negative     Bilirubin, UA Negative     Blood, UA Negative     Protein, UA Negative     Leuk Esterase, UA Negative     Nitrite, UA Negative     Urobilinogen, UA 0.2 E.U./dL    Narrative:       Urine microscopic not indicated.    Protime-INR [[id number]]  (Abnormal) Collected:  03/23/17 0438    Specimen:  Blood Updated:  03/23/17 0553     Protime 17.9 (H) Seconds      INR 1.54 (H)          Objective:  General Appearance:  86 y.o. female . Awake and alert.  No complaints while lying still.      Assessment:  Physical Exam:  Able to move left leg without much pain.  Has slight groin pain with flexion of hip.  Have discussed with Dr. Ibrahim.  MRI negative for IT fracture, but does have rami fractures.     Vitals:    03/22/17 2300 03/23/17 0300  03/23/17 0726 03/23/17 1052   BP: 133/56 153/81 141/71 147/69   BP Location: Left arm Right arm Left arm Right arm   Patient Position: Lying Lying Lying Lying   Pulse: 62 83 63 60   Resp: 16 16 16 16   Temp: 97.9 °F (36.6 °C) 97.4 °F (36.3 °C) 97.6 °F (36.4 °C) 97.9 °F (36.6 °C)   TempSrc: Oral Oral Oral Oral   SpO2: 99% 98% 92% 94%   Weight:       Height:           Plan:  Will cancel surgery for today and have PT start working with the patient for ambulation , gait training, transfers and muscle strengthening exercises.   Will restart Coumadin.     Continue Pain management efforts  Continue mobilization efforts  Continue DVT Prophylaxis    Discharge Plan:  Have discussed with patient's daughter in detail.  Patient lives in independent living at [facility] and has had 2 falls in the last week or so.  She is wanting her to go to Skilled care at [facility] for further rehab.  Will have CCP to see patient.      Date: 3/23/2017    Josie Valenzuela RN

## 2017-03-23 NOTE — PROGRESS NOTES
Discharge Planning Assessment  [facility]     Patient Name: Britany Pruitt  MRN: [id number]  Today's Date: 3/23/2017    Admit Date: 3/22/2017          Discharge Needs Assessment       03/23/17 1815    Living Environment    Lives With alone;facility resident    Living Arrangements house    Discharge Needs Assessment    Concerns To Be Addressed basic needs concerns    Readmission Within The Last 30 Days no previous admission in last 30 days    Anticipated Changes Related to Illness none    Equipment Currently Used at Home none    Discharge Facility/Level Of Care Needs home with home health    Transportation Available car;family or friend will provide            Discharge Plan       03/23/17 1813    Case Management/Social Work Plan    Plan [facility]    Patient/Family In Agreement With Plan yes    Additional Comments Spoke with pt and daughter Lora [phone], pt is from [facility], discussed pt being in observation status. Pt working well with therapy. Pts daughter would like pt to d/c back and have therapy assist at her villa if PT agrees pt is stable to d/c home. Plan will be to return to [facility] with outpatient RH/HH. CCP to follow for any additional needs.          Kimberly Deleon RN

## 2017-03-23 NOTE — PROGRESS NOTES
"  Name: Britany Pruitt  ADMIT: 3/22/2017   Age/Sex: 86 y.o.female LOS:  LOS: 1 day    :    1930     ROOM: [facility]   MRN:    [id number]    PCP:    Jerome Moore Jr., MD     Subjective   Pain well controlled. No issues    Objective   Vital Signs  Temp:  [97.4 °F (36.3 °C)-98.3 °F (36.8 °C)] 97.9 °F (36.6 °C)  Heart Rate:  [59-90] 60  Resp:  [16] 16  BP: (133-177)/(56-81) 147/69  Body mass index is 25.61 kg/(m^2).    Objective:  General Appearance:  Comfortable and well-appearing.    Vital signs: (most recent): Blood pressure 147/69, pulse 60, temperature 97.9 °F (36.6 °C), temperature source Oral, resp. rate 16, height 62\" (157.5 cm), weight 140 lb (63.5 kg), SpO2 94 %.  Vital signs are normal.    HEENT: Normal HEENT exam.    Lungs:  Normal effort.  Breath sounds clear to auscultation.    Heart: Normal rate.  Regular rhythm.    Abdomen: Abdomen is soft and non-distended.  Bowel sounds are normal.   There is no abdominal tenderness.     Extremities: There is no deformity or dependent edema.    Neurological: Patient is alert.  (Oriented x2).    Skin:  Warm and dry.  No rash.             Results Review:       I reviewed the patient's new clinical results.    Results from last 7 days  Lab Units 17  1030   WBC 10*3/mm3 7.96   HEMOGLOBIN g/dL 12.6   PLATELETS 10*3/mm3 457     Results from last 7 days  Lab Units 17  1030   SODIUM mmol/L 144   POTASSIUM mmol/L 3.9   CHLORIDE mmol/L 102   TOTAL CO2 mmol/L 26.3   BUN mg/dL 15   CREATININE mg/dL 0.65   GLUCOSE mg/dL 130*   Estimated Creatinine Clearance: 44.2 mL/min (by C-G formula based on Cr of 0.65).  Results from last 7 days  Lab Units 17  1030   CALCIUM mg/dL 9.2   ALBUMIN g/dL 3.90       RADIOLOGY  3/23/2017  Pending      buPROPion 75 mg Oral TID   FLUoxetine 20 mg Oral Once per day on    multivitamin with minerals 1 tablet Oral Daily   niacin 500 mg Oral Daily   OLANZapine 5 mg Oral Nightly   triamcinolone  Mouth/Throat BID "   warfarin 5 mg Oral Daily      Diet Regular      Assessment/Plan   Assessment:   Principal Problem:    Closed nondisplaced intertrochanteric fracture of left femur  Active Problems:    History of DVT (deep vein thrombosis)    Chronic anticoagulation    Opiates and related narcotics causing adverse effect    Fall        Plan:   - MRI shows acute fractures of her ischiopubic rami but no femur fracture. Will await any further recs from Ortho  - INR 1.5 today. Coumadin restarted. Will give therapeutic lovenox to bridge  - PT to eval  - cont pain management      Disposition  When ok with ortho. Tomorrow or next day .      João Sanchez MD  [facility]  03/23/17  1:21 PM

## 2017-03-23 NOTE — PLAN OF CARE
Problem: Fall Risk (Adult)  Goal: Identify Related Risk Factors and Signs and Symptoms  Outcome: Outcome(s) achieved Date Met:  03/23/17 03/23/17 1315   Fall Risk   Fall Risk: Related Risk Factors gait/mobility problems   Fall Risk: Signs and Symptoms presence of risk factors       Goal: Absence of Falls  Outcome: Outcome(s) achieved Date Met:  03/23/17 03/23/17 1315   Fall Risk (Adult)   Absence of Falls achieves outcome       Goal: Identify Related Risk Factors and Signs and Symptoms  Outcome: Ongoing (interventions implemented as appropriate)    03/23/17 1315   Fall Risk   Fall Risk: Related Risk Factors gait/mobility problems   Fall Risk: Signs and Symptoms presence of risk factors       Goal: Identify Related Risk Factors and Signs and Symptoms  Outcome: Ongoing (interventions implemented as appropriate)    03/23/17 1315   Fall Risk   Fall Risk: Related Risk Factors gait/mobility problems   Fall Risk: Signs and Symptoms presence of risk factors

## 2017-03-24 VITALS
OXYGEN SATURATION: 96 % | RESPIRATION RATE: 16 BRPM | SYSTOLIC BLOOD PRESSURE: 147 MMHG | DIASTOLIC BLOOD PRESSURE: 83 MMHG | WEIGHT: 140 LBS | HEIGHT: 62 IN | TEMPERATURE: 97.8 F | HEART RATE: 55 BPM | BODY MASS INDEX: 25.76 KG/M2

## 2017-03-24 LAB
INR PPP: 1.26 (ref 0.9–1.1)
PROTHROMBIN TIME: 15.4 SECONDS (ref 11.7–14.2)

## 2017-03-24 PROCEDURE — 85610 PROTHROMBIN TIME: CPT | Performed by: ORTHOPAEDIC SURGERY

## 2017-03-24 PROCEDURE — 94799 UNLISTED PULMONARY SVC/PX: CPT

## 2017-03-24 PROCEDURE — 96372 THER/PROPH/DIAG INJ SC/IM: CPT

## 2017-03-24 PROCEDURE — G0378 HOSPITAL OBSERVATION PER HR: HCPCS

## 2017-03-24 PROCEDURE — 25010000002 ENOXAPARIN PER 10 MG: Performed by: INTERNAL MEDICINE

## 2017-03-24 RX ORDER — HYDROCODONE BITARTRATE AND ACETAMINOPHEN 5; 325 MG/1; MG/1
1 TABLET ORAL EVERY 6 HOURS PRN
Qty: 15 TABLET | Refills: 0 | Status: SHIPPED | OUTPATIENT
Start: 2017-03-24 | End: 2017-03-24 | Stop reason: HOSPADM

## 2017-03-24 RX ORDER — TRAMADOL HYDROCHLORIDE 50 MG/1
25 TABLET ORAL EVERY 6 HOURS PRN
Qty: 15 TABLET | Refills: 0 | Status: SHIPPED | OUTPATIENT
Start: 2017-03-24 | End: 2017-04-01

## 2017-03-24 RX ADMIN — MULTIPLE VITAMINS W/ MINERALS TAB 1 TABLET: TAB at 08:43

## 2017-03-24 RX ADMIN — FLUOXETINE 20 MG: 20 CAPSULE ORAL at 08:43

## 2017-03-24 RX ADMIN — Medication 500 MG: at 08:43

## 2017-03-24 RX ADMIN — HYDROCODONE BITARTRATE AND ACETAMINOPHEN 1 TABLET: 5; 325 TABLET ORAL at 03:48

## 2017-03-24 RX ADMIN — ENOXAPARIN SODIUM 60 MG: 60 INJECTION SUBCUTANEOUS at 06:06

## 2017-03-24 RX ADMIN — BUPROPION HYDROCHLORIDE 75 MG: 75 TABLET, FILM COATED ORAL at 08:43

## 2017-03-24 NOTE — PLAN OF CARE
Problem: Fall Risk (Adult)  Goal: Identify Related Risk Factors and Signs and Symptoms  Outcome: Outcome(s) achieved Date Met:  03/24/17 03/24/17 1231   Fall Risk   Fall Risk: Related Risk Factors gait/mobility problems   Fall Risk: Signs and Symptoms presence of risk factors       Goal: Absence of Falls  Outcome: Ongoing (interventions implemented as appropriate)    03/24/17 1231   Fall Risk (Adult)   Absence of Falls achieves outcome       Goal: Identify Related Risk Factors and Signs and Symptoms  Outcome: Outcome(s) achieved Date Met:  03/24/17 03/24/17 1231   Fall Risk   Fall Risk: Related Risk Factors gait/mobility problems   Fall Risk: Signs and Symptoms presence of risk factors         Problem: Patient Care Overview (Adult)  Goal: Plan of Care Review  Outcome: Ongoing (interventions implemented as appropriate)    03/24/17 1231   Coping/Psychosocial Response Interventions   Plan Of Care Reviewed With patient   Patient Care Overview   Progress improving   Outcome Evaluation   Outcome Summary/Follow up Plan Pt does not complain of pain. VSS. Waiting on DC orders from admitting MD.       Goal: Adult Individualization and Mutuality    03/24/17 1231   Individualization   Patient Specific Goals pain control   Patient Specific Interventions provide pain meds when needed.       Goal: Discharge Needs Assessment  Outcome: Ongoing (interventions implemented as appropriate)    03/24/17 1231   Discharge Needs Assessment   Concerns To Be Addressed no discharge needs identified         Problem: Orthopaedic Fracture (Adult)  Goal: Signs and Symptoms of Listed Potential Problems Will be Absent or Manageable (Orthopaedic Fracture)    03/24/17 1231   Orthopaedic Fracture   Problems Assessed (Orthopaedic Fracture) all   Problems Present (Orthopaedic Fracture) functional deficit/ self-care deficit

## 2017-03-24 NOTE — DISCHARGE SUMMARY
Date of Admission: 3/22/2017  Date of Discharge:  3/24/2017    PCP: Jerome Moore Jr., MD      DISCHARGE DIAGNOSIS  Principal Problem:    Closed nondisplaced intertrochanteric fracture of left femur  Active Problems:    History of DVT (deep vein thrombosis)    Chronic anticoagulation    Opiates and related narcotics causing adverse effect    Fall      SECONDARY DIAGNOSES  Past Medical History:   Diagnosis Date   • Arthritis of back    • Arthritis of spine    • Back pain    • Depression    • Hx of blood clots     2 seperate episoides. Last one over 20 years ago. Also s/p IVC filter       CONSULTS   Consults     Date and Time Order Name Status Description    3/22/2017 1813 Inpatient Consult to Orthopedic Surgery      3/22/2017 1312 Ortho (on-call MD unless specified) Completed     3/22/2017 1312 [facility] (on-call MD unless specified) Completed           PROCEDURES PERFORMED  XRay Hip:  There is a subtle linear lucency in the intertrochanteric region of the left femur which likely represents a nondisplaced fracture. No acute pelvic fracture is seen. There are moderately extensive osteoarthritic changes at both hip joints.    MRI Left Hip:  There are acute, nondisplaced fractures of the left superior and inferior ischiopubic rami. There is no sacral fracture. There is no proximal femoral fracture. Soft tissue edema is observed around the fractures. There is no hematoma.    HOSPITAL COURSE  Patient is a 86 y.o. female presented to [facility] complaining of left hip pain after a fall at home.  Please see the admitting history and physical for further details. Initially it was thought that she had a L femur/hip fracture based on Xray but MRI demonstrated ischiopubic rami fractures. Ortho was consulted and no operative treatment was warranted. She was seen by PT who recommended home health. Her pain was under good control at the time of discharge.     She has a h/o DVT and is on Coumadin. Her INR was  "subtherapeutic while here so she will be sent out on bridging therapy with Lovenox. Her PCP will need to follow up results of INR that she is to have drawn on Monday and will need to make any adjustments to her Coumadin at that time.       CONDITION ON DISCHARGE  Stable.      VITAL SIGNS  /83 (BP Location: Right arm, Patient Position: Lying)  Pulse 55  Temp 97.8 °F (36.6 °C) (Oral)   Resp 16  Ht 62\" (157.5 cm)  Wt 140 lb (63.5 kg)  SpO2 96%  BMI 25.61 kg/m2  Objective:  General Appearance:  Comfortable and well-appearing.    Vital signs: (most recent): Blood pressure 147/83, pulse 55, temperature 97.8 °F (36.6 °C), temperature source Oral, resp. rate 16, height 62\" (157.5 cm), weight 140 lb (63.5 kg), SpO2 96 %.  Vital signs are normal.    HEENT: Normal HEENT exam.    Lungs:  Normal effort.  Breath sounds clear to auscultation.    Heart: Normal rate.  Regular rhythm.    Abdomen: Abdomen is soft and non-distended.  Bowel sounds are normal.   There is no abdominal tenderness.     Extremities: There is no deformity or dependent edema.    Neurological: Patient is alert.  (Oriented x2).    Skin:  Warm and dry.  No rash.               DISCHARGE DISPOSITION   Home or Self Care      DISCHARGE MEDICATIONS   Britany Pruitt   Home Medication Instructions PAULETTE:[id number]    Printed on:03/24/17 4150   Medication Information                      Acetaminophen (TYLENOL PO)  Take  by mouth.             buPROPion (WELLBUTRIN) 75 MG tablet  Take 75 mg by mouth 3 (Three) Times a Day.             Cholecalciferol (VITAMIN D PO)  Take  by mouth.             enoxaparin (LOVENOX) 60 MG/0.6ML solution syringe  Inject 0.6 mL under the skin Every 12 (Twelve) Hours for 5 days.             FLUoxetine (PROzac) 20 MG tablet  Take 20 mg by mouth daily. TAKES 3DAYS WEEKLY(MWF)             furosemide (LASIX) 20 MG tablet  Take 20 mg by mouth daily.             L-Methylfolate-M03-T2-V7 (CEREFOLIN PO)  Take 1 tablet by mouth daily.      "        LORazepam (ATIVAN) 0.5 MG tablet  Take 0.5 mg by mouth every 8 (eight) hours as needed for anxiety.             Multiple Vitamins-Minerals (CENTRUM ADULTS PO)  Take 1 tablet by mouth daily.             NIACIN CR PO  Take 1 tablet by mouth 3 (three) times a day before meals.             OLANZapine (ZYPREXA) 5 MG tablet  Take 5 mg by mouth every night.             traMADol (ULTRAM) 50 MG tablet  Take 0.5 tablets by mouth Every 6 (Six) Hours As Needed for Moderate Pain (4-6) for up to 8 days.             triamcinolone (KENALOG) 0.1 % paste  Apply sparingly to the gums 2 times a day.             warfarin (COUMADIN) 5 MG tablet  Take 5 mg by mouth Daily. On Tues, Wed, Thurs, Fri, Sat, & Sun             warfarin (COUMADIN) 7.5 MG tablet  Take 7.5 mg by mouth Daily. On Monday only                No future appointments.  Additional Instructions for the Follow-ups that You Need to Schedule     Ambulatory Referral to Home Health    As directed    Face to Face Visit Date:  3/24/2017   Follow-up Provider for Plan of Care?:  I treated the patient in an acute care facility and will not continue treatment after discharge.   Follow-up Provider:  CAROLYN BAHENA JR.   Reason/Clinical Findings:  Weakness, pelvic fracture   Describe mobility limitations that make leaving home difficult:  decreased mobility, weakness   Nursing/Therapeutic Services Requested:   Physical Therapy Comment - Needs INR drawn on Monday  Other      PT orders:   Gait Training  Strengthening  Therapeutic exercise      Weight Bearing Status:  Full Weight Bearing   Frequency:  1 Week 1       Ambulatory Referral to Physical Therapy Evaluate and treat    As directed    Specialty modality needed?:  Evaluate and treat       Discharge Follow-up with Specialty    As directed    Specialty:  Orthopedics   Follow Up:  1 Month   Follow Up Details:  Return to the office to see Dr. Jd Ibrahim             Follow-up Information     Follow up with [facility]  [facility] [place] .    Specialty:  Home Health Services    Contact information:    [address] [facility] [address]  [facility] [address] [phone]        Follow up with Jerome Moore Jr., MD .    Specialty:  Internal Medicine    Contact information:    [address]  [facility] [address]  [facility] [address]  [phone]          Follow up with [facility] [place] AND [facility] .    Specialty:  Home Health Services    Contact information:    [address] [facility] [address]  [facility] [address]            TEST  RESULTS PENDING AT DISCHARGE         João Sanchez MD  [facility]  03/24/17  1:38 PM      Time: greater than 30 minutes.      Dragon disclaimer:  Much of this encounter note is an electronic transcription/translation of spoken language to printed text. The electronic translation of spoken language may permit erroneous, or at times, nonsensical words or phrases to be inadvertently transcribed; Although I have reviewed the note for such errors, some may still exist.

## 2017-03-24 NOTE — PLAN OF CARE
Problem: Fall Risk (Adult)  Goal: Absence of Falls  Outcome: Ongoing (interventions implemented as appropriate)    03/24/17 0428   Fall Risk (Adult)   Absence of Falls making progress toward outcome         Problem: Patient Care Overview (Adult)  Goal: Plan of Care Review  Outcome: Ongoing (interventions implemented as appropriate)    03/24/17 0428   Coping/Psychosocial Response Interventions   Plan Of Care Reviewed With patient   Patient Care Overview   Progress improving   Outcome Evaluation   Outcome Summary/Follow up Plan PT reports minimal complaints of pain that is well controlled with PO analgesic. Ambulating with walker with SBA. VSS. Pending discharge         Problem: Orthopaedic Fracture (Adult)  Goal: Signs and Symptoms of Listed Potential Problems Will be Absent or Manageable (Orthopaedic Fracture)  Outcome: Ongoing (interventions implemented as appropriate)    03/24/17 0428   Orthopaedic Fracture   Problems Assessed (Orthopaedic Fracture) all   Problems Present (Orthopaedic Fracture) pain

## 2017-03-24 NOTE — PROGRESS NOTES
Continued Stay Note  [facility]     Patient Name: Britany Pruitt  MRN: [id number]  Today's Date: 3/24/2017    Admit Date: 3/22/2017          Discharge Plan       03/24/17 1119    Case Management/Social Work Plan    Plan [facility] with outpatient PT    Additional Comments Pt states preference to forego home health and instead use outpatient PT services via her independent living facility. Per Daria, pt will require outpatient PT order upon d/c for facility to provide PT services. CCP requested OPPT order in MD norman note. Priyanka Meraz LCSW              Discharge Codes     None            Ashley Meraz LCSW

## 2017-03-24 NOTE — PROGRESS NOTES
I spoke today with the patient and relayed the findings of the MRI scan of the hip.  There were pubic rami fractures on the left, nondisplaced, but no evidence of left hip fracture.  I told her that no surgery would be required, and apologized for causing any apprehension.  She may bear weight as tolerated and I think she can be safely discharged home as is her wish, so long as she is stable on a walker and has adequate pain control.

## 2017-03-27 ENCOUNTER — TELEPHONE (OUTPATIENT)
Dept: ORTHOPEDIC SURGERY | Facility: CLINIC | Age: 82
End: 2017-03-27

## 2017-03-27 NOTE — TELEPHONE ENCOUNTER
No he ordered OP PT and an order was to be sent with her to [facility] for rehab.  The hospitalist ordered the Home Health.  I will fax the PT order to [facility].

## 2017-04-25 ENCOUNTER — TELEPHONE (OUTPATIENT)
Dept: ORTHOPEDIC SURGERY | Facility: CLINIC | Age: 82
End: 2017-04-25

## 2017-05-10 ENCOUNTER — OFFICE VISIT (OUTPATIENT)
Dept: ORTHOPEDIC SURGERY | Facility: CLINIC | Age: 82
End: 2017-05-10

## 2017-05-10 VITALS — WEIGHT: 135 LBS | HEIGHT: 62 IN | TEMPERATURE: 98.4 F | BODY MASS INDEX: 24.84 KG/M2

## 2017-05-10 DIAGNOSIS — M25.552 LEFT HIP PAIN: Primary | ICD-10-CM

## 2017-05-10 PROCEDURE — 72170 X-RAY EXAM OF PELVIS: CPT | Performed by: ORTHOPAEDIC SURGERY

## 2017-05-10 PROCEDURE — 99212 OFFICE O/P EST SF 10 MIN: CPT | Performed by: ORTHOPAEDIC SURGERY

## 2017-05-24 ENCOUNTER — TELEPHONE (OUTPATIENT)
Dept: ORTHOPEDIC SURGERY | Facility: CLINIC | Age: 82
End: 2017-05-24

## 2017-05-25 ENCOUNTER — TELEPHONE (OUTPATIENT)
Dept: ORTHOPEDIC SURGERY | Facility: CLINIC | Age: 82
End: 2017-05-25

## 2017-06-05 ENCOUNTER — TELEPHONE (OUTPATIENT)
Dept: ORTHOPEDIC SURGERY | Facility: CLINIC | Age: 82
End: 2017-06-05

## 2017-06-05 NOTE — TELEPHONE ENCOUNTER
Please explain that I am playing catch up this week from the holiday week last week.   I can't see her this week but I could probably work her in next week if it's still bothering her...

## 2017-09-08 ENCOUNTER — APPOINTMENT (OUTPATIENT)
Dept: GENERAL RADIOLOGY | Facility: HOSPITAL | Age: 82
End: 2017-09-08

## 2017-09-08 ENCOUNTER — APPOINTMENT (OUTPATIENT)
Dept: CT IMAGING | Facility: HOSPITAL | Age: 82
End: 2017-09-08

## 2017-09-08 ENCOUNTER — HOSPITAL ENCOUNTER (EMERGENCY)
Facility: HOSPITAL | Age: 82
Discharge: HOME | End: 2017-09-08
Attending: FAMILY MEDICINE | Admitting: FAMILY MEDICINE

## 2017-09-08 VITALS
HEART RATE: 62 BPM | BODY MASS INDEX: 23.74 KG/M2 | TEMPERATURE: 98.3 F | RESPIRATION RATE: 16 BRPM | SYSTOLIC BLOOD PRESSURE: 190 MMHG | HEIGHT: 62 IN | OXYGEN SATURATION: 96 % | DIASTOLIC BLOOD PRESSURE: 83 MMHG | WEIGHT: 129 LBS

## 2017-09-08 DIAGNOSIS — S40.012A CONTUSION OF LEFT SHOULDER, INITIAL ENCOUNTER: ICD-10-CM

## 2017-09-08 DIAGNOSIS — S00.93XA CONTUSION OF HEAD, UNSPECIFIED PART OF HEAD, INITIAL ENCOUNTER: ICD-10-CM
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
## 2017-09-08 DIAGNOSIS — S42.022A CLOSED DISPLACED FRACTURE OF SHAFT OF LEFT CLAVICLE, INITIAL ENCOUNTER: Primary | ICD-10-CM

## 2017-09-08 LAB
INR PPP: 2.17 (ref 0.9–1.1)
PROTHROMBIN TIME: 23.5 SECONDS (ref 11.7–14.2)

## 2017-09-08 PROCEDURE — 73030 X-RAY EXAM OF SHOULDER: CPT

## 2017-09-08 PROCEDURE — 99283 EMERGENCY DEPT VISIT LOW MDM: CPT

## 2017-09-08 PROCEDURE — 85610 PROTHROMBIN TIME: CPT | Performed by: FAMILY MEDICINE

## 2017-09-08 PROCEDURE — 70450 CT HEAD/BRAIN W/O DYE: CPT

## 2017-09-08 PROCEDURE — 36415 COLL VENOUS BLD VENIPUNCTURE: CPT | Performed by: FAMILY MEDICINE

## 2017-09-08 PROCEDURE — 72125 CT NECK SPINE W/O DYE: CPT

## 2017-09-08 RX ORDER — TRAMADOL HYDROCHLORIDE 50 MG/1
50 TABLET ORAL ONCE
Status: COMPLETED | OUTPATIENT
Start: 2017-09-08 | End: 2017-09-08

## 2017-09-08 RX ORDER — TRAMADOL HYDROCHLORIDE 50 MG/1
50 TABLET ORAL EVERY 6 HOURS PRN
Qty: 28 TABLET | Refills: 0 | Status: ON HOLD | OUTPATIENT
Start: 2017-09-08 | End: 2018-01-01

## 2017-09-08 RX ADMIN — TRAMADOL HYDROCHLORIDE 50 MG: 50 TABLET, FILM COATED ORAL at 16:07

## 2017-09-08 NOTE — ED PROVIDER NOTES
The patient presents complaining of fall onto her L shoulder onset 5 days ago. Pt denies numbness. Pt's imaging results show a fractured clavicle.     Physical exam:  Patient is nontoxic appearing.   Back/extremities: Swelling with mild ecchymosis over L clavicle.   Neuro: No distal neuro vascular deficit in LUE.     I agree with the plan to discharge the pt home.      I supervised care provided by the midlevel provider.  We have discussed this patient's history, physical exam, and treatment plan.  I have reviewed the note and personally saw and examined the patient and agree with the plan of care.    Documentation assistance provided by nadira Vyas for Dr. Paz.  Information recorded by the scribe was done at my direction and has been verified and validated by me.       Deidra Vyas  09/08/17 1624       Varinder Paz MD  09/09/17 0126

## 2017-09-08 NOTE — ED NOTES
Pt comes to ER for falls on both Sunday and Tuesday due to tripping. Pt states that during one of the falls she hit her head but did not lose consciousness. She also c/o left shoulder pain from landing on left side. Denies any other pain or neuro deficits at this time.      Chrystal Morales RN  09/08/17 1200

## 2017-09-08 NOTE — DISCHARGE INSTRUCTIONS
Medications as ordered  Ice to clavicle and shoulder  Wear sling for 3-5 days   Keep scheduled appointment with Dr Vyas on Sept. 18th  Return to er for fever, chills, nausea,vomiting, headache, weakness, increased pain or any new or worsening symptoms

## 2017-09-08 NOTE — ED PROVIDER NOTES
EMERGENCY DEPARTMENT ENCOUNTER    CHIEF COMPLAINT  Chief Complaint: pain post falls  History given by: patient, family  History limited by: N/A  Room Number: 06/06  PMD: Jerome Moore Jr., MD  Orthopedic surgeon- Dr Vyas (has appt on 9/18/17)    HPI:  Pt is a 87 y.o. female who presents s/p falls x2. She states that she is supposed to use her walker during ambulation but only uses it when she is outdoors. On Sunday (9/3/17) and Tuesday (9/5/17), she sustained 2 falls during which she lost her balance and fell. During both falls, she was not using her walker. She struck her left shoulder during the 1st fall and hit her head during the 2nd fall. The left shoulder pain is exacerbated by LUE movement. She reports that she had no prodrome before the falls (denies dizziness, syncope, chest pain, trouble breathing, palpitations, abd pain, and any other sx). From the falls, she denies loss of consciousness, headache, neck pain, back pain, abd pain, chest pain, sensory loss, motor loss, and sustaining any other injury. Per family, pt is at her baseline mental status. Past Medical History of DVT (on coumadin). Past Surgical History of left shoulder replacement.     Duration: falls occurred on 9/3/17 and 9/5/17  Timing: intermittent  Location: left shoulder  Radiation: none  Quality: pain  Intensity/Severity: moderate   Progression: unchanged  Associated Symptoms: left shoulder pain  Aggravating Factors: LUE movement  Alleviating Factors: resting LUE  Previous Episodes: none  Treatment before arrival: none mentioned    PAST MEDICAL HISTORY  Active Ambulatory Problems     Diagnosis Date Noted   • Lumbar compression fracture 04/19/2016   • Closed nondisplaced intertrochanteric fracture of left femur 03/22/2017   • History of DVT (deep vein thrombosis) 03/22/2017   • Chronic anticoagulation 03/22/2017   • Opiates and related narcotics causing adverse effect 03/22/2017   • Fall 03/22/2017     Resolved Ambulatory  Problems     Diagnosis Date Noted   • No Resolved Ambulatory Problems     Past Medical History:   Diagnosis Date   • Arthritis of back    • Arthritis of spine    • Back pain    • Depression    • Hx of blood clots        PAST SURGICAL HISTORY  Past Surgical History:   Procedure Laterality Date   • BREAST SURGERY     • CATARACT EXTRACTION     • GALLBLADDER SURGERY     • HYSTERECTOMY     • JOINT REPLACEMENT  2014    LEFT   • KNEE ARTHROPLASTY, PARTIAL REPLACEMENT     • KYPHOPLASTY N/A 4/25/2016    Procedure: L1 kyphoplasty;  Surgeon: Jd Ibrahim MD;  [facility] 18/19;  Service:    • VENA CAVA FILTER INSERTION         FAMILY HISTORY  Family History   Problem Relation Age of Onset   • No Known Problems Mother    • Deep vein thrombosis Neg Hx        SOCIAL HISTORY  Social History     Social History   • Marital status:      Spouse name: N/A   • Number of children: N/A   • Years of education: N/A     Occupational History   • Not on file.     Social History Main Topics   • Smoking status: Never Smoker   • Smokeless tobacco: Never Used   • Alcohol use No   • Drug use: Defer   • Sexual activity: Defer     Other Topics Concern   • Not on file     Social History Narrative         ALLERGIES  Review of patient's allergies indicates no known allergies.    REVIEW OF SYSTEMS  Review of Systems   Constitutional: Negative for chills and fever.   HENT: Negative for sore throat.    Respiratory: Negative for cough and shortness of breath.    Cardiovascular: Negative for chest pain.   Gastrointestinal: Negative for abdominal pain, nausea and vomiting.   Genitourinary: Negative for dysuria.   Musculoskeletal: Negative for back pain and neck pain.        Left shoulder pain   Neurological: Negative for dizziness, weakness, numbness and headaches.   Psychiatric/Behavioral: The patient is not nervous/anxious.        PHYSICAL EXAM  ED Triage Vitals   Temp Heart Rate Resp BP SpO2   09/08/17 1122 09/08/17 1122 09/08/17  1122 09/08/17 1200 09/08/17 1122   98.3 °F (36.8 °C) 72 16 129/78 96 % WNL       Physical Exam   Constitutional: She is oriented to person, place, and time and well-developed, well-nourished, and in no distress.   HENT:   Head: Normocephalic.   Mouth/Throat: Mucous membranes are normal.   Hematoma to left lateral head   Eyes: EOM are normal. Pupils are equal, round, and reactive to light. No scleral icterus.   Neck: Normal range of motion. Neck supple.   No c-spine tenderness   Cardiovascular: Normal rate, regular rhythm and normal heart sounds.    Pulses:       Radial pulses are 2+ on the right side, and 2+ on the left side.   Pulmonary/Chest: Effort normal and breath sounds normal. No respiratory distress. She exhibits no tenderness.   Abdominal: Soft. There is no tenderness.   Musculoskeletal: Normal range of motion.   Tenderness to left shoulder with FROM, no left forearm bony tenderness, no t-spine or l-spine tenderness, pelvis stable, NV intact distally to all extremities   Neurological: She is alert and oriented to person, place, and time. She has normal motor skills and normal sensation.   No focal neuro deficits, equal  strength bilaterally   Skin: Skin is warm and dry. Bruising (bruising to left shoulder and left forearm) noted.   Psychiatric: Mood and affect normal.   Nursing note and vitals reviewed.      LAB RESULTS  Recent Results (from the past 24 hour(s))   Protime-INR    Collection Time: 09/08/17 12:50 PM   Result Value Ref Range    Protime 23.5 (H) 11.7 - 14.2 Seconds    INR 2.17 (H) 0.90 - 1.10       I ordered the above labs and reviewed the results      RADIOLOGY           CT Cervical Spine Without Contrast (Preliminary result) Result time: 09/08/17 15:04:58     Preliminary result by Interface, Rad Results [place] In (09/08/17 15:04:58)     Impression:     Atrophy, small vessel ischemic disease and vascular  calcification similar to 03/22/2017. No evidence of acute infarction,  hemorrhage  or fracture. Further evaluation could be performed with a MRI  examination of the brain as indicated.     CT EXAMINATION OF THE CERVICAL SPINE WITHOUT CONTRAST: There is severe  loss of disc height at C5-6 and C6-7. There is a grade 1 anterolisthesis  of C4 upon C5 estimated to be approximately 3 mm. There is mild loss of  disc height at C3-4 and C4-5.     C2-3: Mild facet degenerative disease is present bilaterally.     C3-4: There is moderate to severe facet degenerative disease on the  right. There is mild neuroforaminal compromise on the right secondary to  extension of a small disc osteophyte complex into the neural foramen and  to uncovertebral degenerative disease.     C4-5: There is moderate to severe facet degenerative disease  bilaterally. There is a mild broad-based disc osteophyte complex.     C5-6: There is a mild broad-based disc osteophyte complex with no  evidence of herniation. There is mild to moderate neuroforaminal  compromise on the left secondary to uncovertebral degenerative disease.     C6-7: There is a mild broad-based disc osteophyte complex with no  evidence of herniation.     C7-T1: There is no evidence of disc bulge or herniation.     The thyroid is heterogeneous in appearance with areas of decreased  attenuation present bilaterally with a 1.6 cm decreased attenuation  present involving the left lobe of the thyroid gland centrally. A higher  attenuation nodule is appreciated involving the right lobe of the  thyroid gland posteriorly and superiorly measuring 2.1 x 1.7 cm in size.  Further evaluation with a thyroid ultrasound and nuclear medicine  thyroid scan is recommended.     IMPRESSION: No evidence of fracture. Multilevel degenerative disease.  Heterogeneous appearance of the thyroid including areas of likely cysts  and nodules. Further evaluation with a thyroid ultrasound or nuclear  medicine thyroid scan is suggested.     The above information was called to and discussed with  Michelle Bach.     Radiation dose reduction techniques were utilized, including automated  exposure control and exposure modulation based on body size.            Narrative:     CT HEAD WITHOUT CONTRAST, CT CERVICAL SPINE WITHOUT CONTRAST     HISTORY: Headache, neck pain, falls.     COMPARISON: Comparison is made to prior CT examination 03/22/2017.     FINDINGS:    CT HEAD WITHOUT CONTRAST: The and brain ventricles are symmetrical.  There is no evidence of intracranial hemorrhage, hydrocephalus or of  abnormal extraaxial fluid. No focal area of decreased attenuation to  suggest acute infarction is identified. Areas of decreased attenuation  involving the white matter of the cerebral hemispheres are noted  bilaterally, nonspecific but suggesting moderate small vessel ischemic  disease. Appearance is similar as compared to 03/22/2017. No focal area  of decreased attenuation to suggest acute infarction is identified. Bone  windows showed no evidence of a calvarial fracture.                  XR Shoulder 2+ View Left (Preliminary result) Result time: 09/08/17 15:03:58     Preliminary result by Interface, Rad Results [place] In (09/08/17 15:03:58)     Impression:     Acute, mildly displaced distal left clavicle fracture. The  coracoclavicular space appears normal. Left shoulder arthroplasty  hardware appears intact; no periprosthetic fracture is evident.        Narrative:     LEFT SHOULDER X-RAY     HISTORY: Fall, shoulder pain, shoulder arthroplasty in 2013.     TECHNIQUE Two x-rays of the left shoulder are provided.      These are correlated with postoperative x-ray 10/03/2013.     FINDINGS: There is total shoulder arthroplasty hardware which appears to  be intact and positioned as expected. A minimally displaced distal  clavicle fracture is observed. The fracture plane is about 1 cm medial  to the articular surface of the acromioclavicular joint and the distal  fragment is displaced cephalad about 3 mm. The  coracoclavicular space is  normal. The scapula and the visualized left thoracic cage appear intact.  There is no pneumothorax. The medial portion of the clavicle is intact.          I ordered the above noted radiological studies and reviewed the images on the PACS system.         PROGRESS AND CONSULTS  3:30 PM- Reviewed pt's history and workup with Dr. Paz.  At bedside evaluation, they agree with the plan of care.  3:35 PM- Rechecked pt. She is resting comfortably and is in no acute distress. Reviewed implications of results (including INR of 2.17, nothing acute indicated on both the CT head and CT c-spine, and displaced distal left clavicle fracture indicated on left shoulder xray), diagnosis, meds, responsibility to follow up, warning signs and symptoms of possible worsening, potential complications and reasons to return to ER with patient and family.  Discussed all results and noted any abnormalities with patient and family.  Discussed absolute need to recheck abnormalities and condition with orthopedic surgeon as scheduled and also with PMD. Informed pt of plan to prescribe short course of pain medication and to place her left shoulder in sling for support, comfort, and stabilization. Advised pt to apply ice to left clavicle and left shoulder and to always use walker when ambulating (d/w pt about the importance of this).   Discussed plan for discharge, as there is no emergent indication for admission.  Pt and family are agreeable and understand need for follow up and repeat testing. They are aware that discharge does not mean that nothing is wrong but it indicates no emergency is present.  Pt is discharged with instructions to follow up with primary care doctor to have their blood pressure rechecked.   3:40 PM- Nursing staff to place left shoulder in sling.   3:47 PM- Ordered tramadol for pain prior to discharge.       DIAGNOSIS  Final diagnoses:   Closed displaced fracture of shaft of left clavicle, initial  "encounter   Contusion of left shoulder, initial encounter   Contusion of head, unspecified part of head, initial encounter   Fall, initial encounter       FOLLOW UP   Jerome Moore Jr., MD  [address] [id number]  [phone]    Call in 3 days      Sergio Vyas MD  [address] [address]  [phone]      keep appointment on Sept. 18th      RX     Medication List      traMADol 50 MG tablet   Commonly known as:  ULTRAM   Take 1 tablet by mouth Every 6 (Six) Hours As Needed for Moderate Pain .           [facility] report [id number] reviewed.  Risks, benefits, alternatives discussed with patient.  Pt consents to treatment and agrees to follow up with PMD tomorrow for further care and any other prescriptions.         COURSE & MEDICAL DECISION MAKING  Pertinent Labs and Imaging studies that were ordered and reviewed are noted above.  Results were reviewed/discussed with the patient and family and they were also made aware of online assess.   Pt and family also made aware that some labs, such as cultures, will not be resulted during ER visit and follow up with PMD is necessary.     MEDICATIONS GIVEN IN ER  Medications   traMADol (ULTRAM) tablet 50 mg (not administered)       /78  Pulse 72  Temp 98.3 °F (36.8 °C) (Tympanic)   Resp 16  Ht 62\" (157.5 cm)  Wt 129 lb (58.5 kg)  SpO2 96%  BMI 23.59 kg/m2      I personally reviewed the past medical history, past surgical history, social history, family history, current medications and allergies as they appear in this chart.  The scribe's note accurately reflects the work and decisions made by me.     Documentation assistance provided by nadira Ervin for DIAN Galvan on 9/8/2017 at 4:02 PM. Information recorded by the nadiar was done at my direction and has been verified and validated by me.       Gabriela Ervin  09/08/17 [id number]       QUENTIN Sánchez  09/08/17 1652    "

## 2017-09-20 NOTE — PROGRESS NOTES
Patient: Britany Pruitt    YOB: 1930    Medical Record Number: [id number]    Chief Complaints:  Left shoulder injury    History of Present Illness:     87 y.o. female patient who presents for evaluation of her left shoulder.  She fell on September 1, landing on her left side.  She noticed immediate pain and swelling over the top of her shoulder.  This has gotten better over the past couple of weeks.  She describes her pain as mild to moderate, intermittent, and aching.  She has some swelling and bruising initially but these have since resolved.  Relative rest, a sling, and anti-inflammatories have all helped with her discomfort.  She localizes her pain to the very top of her shoulder.    Allergies: No Known Allergies    Home Medications:    Current Outpatient Prescriptions:   •  Acetaminophen (TYLENOL PO), Take  by mouth., Disp: , Rfl:   •  buPROPion (WELLBUTRIN) 75 MG tablet, Take 75 mg by mouth 3 (Three) Times a Day., Disp: , Rfl:   •  Cholecalciferol (VITAMIN D PO), Take  by mouth., Disp: , Rfl:   •  FLUoxetine (PROzac) 20 MG tablet, Take 20 mg by mouth daily. TAKES 3DAYS WEEKLY(MWF), Disp: , Rfl:   •  furosemide (LASIX) 20 MG tablet, Take 20 mg by mouth Daily. Two tablets on Saturday, Disp: , Rfl:   •  L-Methylfolate-M07-A5-V3 (CEREFOLIN PO), Take 1 tablet by mouth daily., Disp: , Rfl:   •  LORazepam (ATIVAN) 0.5 MG tablet, Take 0.5 mg by mouth every 8 (eight) hours as needed for anxiety., Disp: , Rfl:   •  Multiple Vitamins-Minerals (CENTRUM ADULTS PO), Take 1 tablet by mouth daily., Disp: , Rfl:   •  NIACIN CR PO, Take 1 tablet by mouth 3 (three) times a day before meals., Disp: , Rfl:   •  OLANZapine (ZYPREXA) 5 MG tablet, Take 5 mg by mouth every night., Disp: , Rfl:   •  traMADol (ULTRAM) 50 MG tablet, Take 1 tablet by mouth Every 6 (Six) Hours As Needed for Moderate Pain ., Disp: 28 tablet, Rfl: 0  •  triamcinolone (KENALOG) 0.1 % paste, Apply sparingly to the gums 2 times a day., Disp: 5  g, Rfl: 2  •  warfarin (COUMADIN) 5 MG tablet, Take 5 mg by mouth Daily., Disp: , Rfl:   •  warfarin (COUMADIN) 7.5 MG tablet, Take 7.5 mg by mouth Daily. On Monday only, Disp: , Rfl:     Past Medical History:   Diagnosis Date   • Arthritis of back    • Arthritis of spine    • Back pain    • Depression    • Hx of blood clots     2 seperate episoides. Last one over 20 years ago. Also s/p IVC filter       Past Surgical History:   Procedure Laterality Date   • BREAST SURGERY     • CATARACT EXTRACTION     • GALLBLADDER SURGERY     • HYSTERECTOMY     • JOINT REPLACEMENT  2014    LEFT   • KNEE ARTHROPLASTY, PARTIAL REPLACEMENT     • KYPHOPLASTY N/A 4/25/2016    Procedure: L1 kyphoplasty;  Surgeon: Jd Ibrahim MD;  Location: [facility] 18/19;  Service:    • VENA CAVA FILTER INSERTION         Social History     Occupational History   • Not on file.     Social History Main Topics   • Smoking status: Never Smoker   • Smokeless tobacco: Never Used   • Alcohol use No   • Drug use: Defer   • Sexual activity: Defer      Social History     Social History Narrative       Family History   Problem Relation Age of Onset   • No Known Problems Mother    • Deep vein thrombosis Neg Hx        Review of Systems:      Constitutional: Denies fever, shaking or chills   Eyes: Denies change in visual acuity   HEENT: Denies nasal congestion or sore throat   Respiratory: Denies cough or shortness of breath   Cardiovascular: Denies chest pain or edema  Endocrine: Denies tremors, palpitations, intolerance of heat or cold, polyuria, polydipsia.  GI: Denies abdominal pain, nausea, vomiting, bloody stools or diarrhea  : Denies frequency, urgency, incontinence, retention, or nocturia.  Musculoskeletal: Denies numbness tingling or loss of motor function except as above  Integument: Denies rash, lesion or ulceration   Neurologic: Denies headache or focal weakness, deficits  Heme: Denies epistaxis, spontaneous or excessive bleeding, epistaxis,  "hematuria, melena, fatigue, enlarged or tender lymph nodes.      All other pertinent positives and negatives as noted above in HPI.    Physical Exam: 87 y.o. female  Vitals:    09/18/17 1540   Temp: 99.2 °F (37.3 °C)   TempSrc: Temporal Artery    Weight: 128 lb (58.1 kg)   Height: 60\" (152.4 cm)       General:  Patient is awake and alert.  Appears in no acute distress or discomfort.    Psych:  Affect and demeanor are appropriate.    Eyes:  Conjunctiva and sclera appear grossly normal.  Eyes track well and EOM seem to be intact.    Ears:  No gross abnormalities.  Hearing adequate for the exam.    Cardiovascular:  Regular rate and rhythm.    Lungs:  Good chest expansion.  Breathing unlabored.    Extremities: The left shoulder is examined.  She does have some prominence over the distal clavicle and acromioclavicular joint.  There is some resolving ecchymosis in this area.  The skin is benign.  Shoulder motion is not quite full but certainly functional.  She can elevate to 155°, externally rotate 50°, internally rotate to T12.  Resisted abduction and elevation are both uncomfortable for her but her strength is good.  No instability of the shoulder.  Good motor and sensory function in the lower arm and hand.  Palpable radial pulse.    Imaging:  AP and AP axial views of the shoulder are ordered and reviewed today.  These are compared to previous x-rays.  She has what appears to be an acute distal clavicle fracture.  The lateral fragment is displaced superiorly approximately 50-75%.  The shoulder prosthesis appears in unchanged position.    Assessment/Plan:  Left distal clavicle fracture    I recommend closed treatment.  We discussed appropriate activity modifications and restrictions.  I will see her back in 1 month.    Sergio Vyas MD    09/18/2017      "

## 2017-10-23 NOTE — PROGRESS NOTES
Patient:Britany Pruitt    YOB: 1930    Medical Record Number:[id number]    Chief Complaints:  Right hand and wrist swelling    History of Present Illness:     87 y.o. female patient who presents for evaluation of her right wrist and hand.  She tells me that she fell about 8 days ago, landing awkwardly on her right side.  She struck her face but does not recall injuring the hand.  The hand did not start hurting her until about 2 days ago.  She does not recall any specific inciting event or factor.  She tells me that it acutely became swollen, painful, and warm.  She denies any fevers, chills, or sweats.  She describes her pain as moderate, constant, and aching.  Ice has helped somewhat.    Allergies:No Known Allergies    Home Medications:    Current Outpatient Prescriptions:   •  Acetaminophen (TYLENOL PO), Take  by mouth., Disp: , Rfl:   •  buPROPion (WELLBUTRIN) 75 MG tablet, Take 75 mg by mouth 3 (Three) Times a Day., Disp: , Rfl:   •  Cholecalciferol (VITAMIN D PO), Take  by mouth., Disp: , Rfl:   •  FLUoxetine (PROzac) 20 MG tablet, Take 20 mg by mouth daily. TAKES 3DAYS WEEKLY(MWF), Disp: , Rfl:   •  furosemide (LASIX) 20 MG tablet, Take 20 mg by mouth Daily. Two tablets on Saturday, Disp: , Rfl:   •  L-Methylfolate-R87-W5-P1 (CEREFOLIN PO), Take 1 tablet by mouth daily., Disp: , Rfl:   •  LORazepam (ATIVAN) 0.5 MG tablet, Take 0.5 mg by mouth every 8 (eight) hours as needed for anxiety., Disp: , Rfl:   •  Multiple Vitamins-Minerals (CENTRUM ADULTS PO), Take 1 tablet by mouth daily., Disp: , Rfl:   •  NIACIN CR PO, Take 1 tablet by mouth 3 (three) times a day before meals., Disp: , Rfl:   •  OLANZapine (ZYPREXA) 5 MG tablet, Take 5 mg by mouth every night., Disp: , Rfl:   •  traMADol (ULTRAM) 50 MG tablet, Take 1 tablet by mouth Every 6 (Six) Hours As Needed for Moderate Pain ., Disp: 28 tablet, Rfl: 0  •  triamcinolone (KENALOG) 0.1 % paste, Apply sparingly to the gums 2 times a day., Disp: 5 g,  Rfl: 2  •  warfarin (COUMADIN) 5 MG tablet, Take 5 mg by mouth Daily., Disp: , Rfl:   •  warfarin (COUMADIN) 7.5 MG tablet, Take 7.5 mg by mouth Daily. On Monday only, Disp: , Rfl:   •  MethylPREDNISolone (MEDROL) 4 MG tablet, Take as directed on package instructions, Disp: 21 tablet, Rfl: 0    Past Medical History:   Diagnosis Date   • Arthritis of back    • Arthritis of spine    • Back pain    • Depression    • Hx of blood clots     2 seperate episoides. Last one over 20 years ago. Also s/p IVC filter       Past Surgical History:   Procedure Laterality Date   • BREAST SURGERY     • CATARACT EXTRACTION     • GALLBLADDER SURGERY     • HYSTERECTOMY     • JOINT REPLACEMENT  2014    LEFT   • KNEE ARTHROPLASTY, PARTIAL REPLACEMENT     • KYPHOPLASTY N/A 4/25/2016    Procedure: L1 kyphoplasty;  Surgeon: Jd Ibrahim MD;  Location: [facility] 18/19;  Service:    • VENA CAVA FILTER INSERTION         Social History     Occupational History   • Not on file.     Social History Main Topics   • Smoking status: Never Smoker   • Smokeless tobacco: Never Used   • Alcohol use No   • Drug use: Defer   • Sexual activity: Defer      Social History     Social History Narrative       Family History   Problem Relation Age of Onset   • No Known Problems Mother    • Deep vein thrombosis Neg Hx        Review of Systems:      Constitutional: Denies fever, shaking or chills   Eyes: Denies change in visual acuity   HEENT: Denies nasal congestion or sore throat   Respiratory: Denies cough or shortness of breath   Cardiovascular: Denies chest pain or edema  Endocrine: Denies tremors, palpitations, intolerance of heat or cold, polyuria, polydipsia.  GI: Denies abdominal pain, nausea, vomiting, bloody stools or diarrhea  : Denies frequency, urgency, incontinence, retention, or nocturia.  Musculoskeletal: Denies numbness tingling or loss of motor function except as above  Integument: Denies rash, lesion or ulceration   Neurologic: Denies  "headache or focal weakness, deficits  Heme: Denies epistaxis, spontaneous or excessive bleeding, epistaxis, hematuria, melena, fatigue, enlarged or tender lymph nodes.      All other pertinent positives and negatives as noted above in HPI.    Physical Exam:87 y.o. female  Vitals:    10/23/17 1519   Temp: 99.2 °F (37.3 °C)   Weight: 129 lb (58.5 kg)   Height: 62\" (157.5 cm)     General:  Patient is awake and alert.  Appears in no acute distress or discomfort.    Psych:  Affect and demeanor are appropriate.    Extremities:  The right wrist and hand are examined.  She has moderate edema over the wrist and dorsum of the hand.  The skin is benign and there are no lacerations or abrasions.  There is increased warmth but no erythema.  She is tender over the dorsum of the radiocarpal articulation extending out into the dorsum of the hand.  No fluctuance or crepitus.  No step-offs.  Wrist motion is limited and uncomfortable.  She can perform  and pinch with good strength.  She can also abduct and adduct her fingers with good strength.  Sensation is intact throughout the hand.  Palpable radial pulse.  Brisk cap refill.    Imaging:  AP, oblique, and lateral views of the right wrist are ordered and reviewed.  No comparison films are available.  She has mild to moderate degenerative changes at the radiocarpal and ulnocarpal articulations.  There is diffuse calcification consistent with chondrocalcinosis.  She has degenerative change at the scapholunate articulation, thumb carpometacarpal articulation, and several of the metacarpal carpophalangeal joints.  No acute abnormalities.  No other concerning findings.    Assessment/Plan:  Right wrist and hand pseudogout    Although she had an injury, the fall predated the onset of her symptoms by almost a week.  Her history and exam are consistent with a diagnosis of pseudogout.  I would like to try putting her on a strong anti-inflammatory.  Unfortunately, that is not an option " because she is on Coumadin.  I suggested, therefore, that we try a Medrol Dosepak.  I would like her to start that today and then call me if no better by Friday.  As long as her symptoms improve with the Dosepak, she can follow-up as needed.  If anything changes or her symptoms worsen, I told her to let me know and I will see her back.    Sergio Vyas MD    10/23/2017

## 2017-11-02 NOTE — PROGRESS NOTES
"Chief Complaint:  Follow-up regarding left clavicle fracture, right wrist and hand swelling    HPI:  Ms. Pruitt comes in today for 2 issues.  The first is her right hand and wrist.  I saw her a little over a week ago and put her on a Dosepak.  She says that her hand pain and swelling completely resolved with medicine.  When she quit taking the Dosepak, within 24 hours, the pain and swelling had recurred.  She says its not quite as bad as it was previously.  She wants to get another Dosepak today.  Denies any fevers, chills, sweats, redness, or worsening pain.    She says the left shoulder is doing fine.  Her pain there is completely resolved and she says she feels like it is back to normal.    Vitals:    11/01/17 1339   Weight: 129 lb (58.5 kg)   Height: 62\" (157.5 cm)     Exam:  The left shoulder is examined.  Mild tenderness to deep palpation over the distal clavicle only.  Left shoulder motion is excellent and pain-free.    The right hand and wrist are again examined.  Similar to last week, she has edema and slight increased warmth over the wrist extending out towards the base of her thumb.  Wrist motion is limited and uncomfortable.    Imaging:  AP and AP axial views of the left shoulder are ordered and reviewed.  These are compared to previous x-rays.  The distal clavicle fracture is again visualized.  There has not been any interval displacement relative to previous films.  Unfortunately though, I do not see much callus yet.    Assessment:  1.  Right wrist pseudogout  2.  2 months s/p closed treatment of left distal clavicle fracture    Plan:  I recommended that we try injecting the wrist today.  I don't feel comfortable putting her on a Dosepak this soon after the last.  The risks, benefits, and alternatives were discussed.  She consented.  We will see how she responds to this.  If her symptoms persist, I told her that she needs to let me know and I want to see her back to reevaluate.    The clavicle does not " seem to demonstrate much evidence for radiographic healing although she seems to be doing great clinically.  If it doesn't bother her, I see no reason to intervene.  She and her daughter agree.  I will release her to follow-up as needed for the clavicle.    Medium Joint Arthrocentesis  Date/Time: 11/2/2017 2:21 PM  Consent given by: patient  Site marked: site marked  Timeout: Immediately prior to procedure a time out was called to verify the correct patient, procedure, equipment, support staff and site/side marked as required   Supporting Documentation  Indications: pain   Procedure Details  Location: wrist (Right wrist) -   Preparation: Patient was prepped and draped in the usual sterile fashion  Needle size: 25 G  Approach: dorsal  Medications administered: 1 mL lidocaine 1 %; 80 mg methylPREDNISolone acetate 80 MG/ML; 1 mL bupivacaine  Patient tolerance: patient tolerated the procedure well with no immediate complications            Sergio Vyas MD  11/01/2017

## 2018-01-01 ENCOUNTER — HOSPITAL ENCOUNTER (INPATIENT)
Facility: HOSPITAL | Age: 83
LOS: 27 days | Discharge: ANOTHER HOSPITAL | End: 2018-09-09
Attending: SPECIALIST | Admitting: SPECIALIST

## 2018-01-01 ENCOUNTER — APPOINTMENT (OUTPATIENT)
Dept: GENERAL RADIOLOGY | Facility: HOSPITAL | Age: 83
End: 2018-01-01

## 2018-01-01 ENCOUNTER — DOCUMENTATION (OUTPATIENT)
Dept: PSYCHIATRY | Facility: HOSPITAL | Age: 83
End: 2018-01-01

## 2018-01-01 ENCOUNTER — HOSPITAL ENCOUNTER (EMERGENCY)
Facility: HOSPITAL | Age: 83
Discharge: PSYCHIATRIC OR SUBSTANCE USE FACILITY | End: 2018-08-13
Attending: EMERGENCY MEDICINE | Admitting: EMERGENCY MEDICINE

## 2018-01-01 ENCOUNTER — APPOINTMENT (OUTPATIENT)
Dept: GENERAL RADIOLOGY | Facility: HOSPITAL | Age: 83
End: 2018-01-01
Attending: HOSPITALIST

## 2018-01-01 ENCOUNTER — HOSPITAL ENCOUNTER (INPATIENT)
Facility: HOSPITAL | Age: 83
LOS: 8 days | End: 2018-09-17
Attending: HOSPITALIST | Admitting: INTERNAL MEDICINE

## 2018-01-01 ENCOUNTER — APPOINTMENT (OUTPATIENT)
Dept: CT IMAGING | Facility: HOSPITAL | Age: 83
End: 2018-01-01

## 2018-01-01 ENCOUNTER — TELEPHONE (OUTPATIENT)
Dept: ORTHOPEDIC SURGERY | Facility: CLINIC | Age: 83
End: 2018-01-01

## 2018-01-01 VITALS
SYSTOLIC BLOOD PRESSURE: 147 MMHG | DIASTOLIC BLOOD PRESSURE: 85 MMHG | HEART RATE: 97 BPM | RESPIRATION RATE: 12 BRPM | OXYGEN SATURATION: 91 % | TEMPERATURE: 98.2 F

## 2018-01-01 VITALS
HEART RATE: 86 BPM | HEIGHT: 62 IN | TEMPERATURE: 98.7 F | WEIGHT: 135 LBS | OXYGEN SATURATION: 93 % | BODY MASS INDEX: 24.84 KG/M2 | DIASTOLIC BLOOD PRESSURE: 99 MMHG | RESPIRATION RATE: 18 BRPM | SYSTOLIC BLOOD PRESSURE: 161 MMHG

## 2018-01-01 VITALS
WEIGHT: 120.8 LBS | OXYGEN SATURATION: 61 % | BODY MASS INDEX: 22.23 KG/M2 | DIASTOLIC BLOOD PRESSURE: 56 MMHG | SYSTOLIC BLOOD PRESSURE: 123 MMHG | HEIGHT: 62 IN | TEMPERATURE: 100.8 F

## 2018-01-01 DIAGNOSIS — R26.81 UNSTEADY GAIT: ICD-10-CM

## 2018-01-01 DIAGNOSIS — F31.5 BIPOLAR DISORDER, CURRENT EPISODE DEPRESSED, SEVERE, WITH PSYCHOTIC FEATURES (HCC): Primary | ICD-10-CM

## 2018-01-01 DIAGNOSIS — F03.91 DEMENTIA WITH BEHAVIORAL DISTURBANCE, UNSPECIFIED DEMENTIA TYPE: Primary | ICD-10-CM

## 2018-01-01 LAB
ACANTHOCYTES BLD QL SMEAR: NORMAL
ALBUMIN SERPL-MCNC: 4.2 G/DL (ref 3.5–5.2)
ALBUMIN/GLOB SERPL: 1.4 G/DL
ALP SERPL-CCNC: 106 U/L (ref 39–117)
ALT SERPL W P-5'-P-CCNC: 25 U/L (ref 1–33)
AMPHET+METHAMPHET UR QL: NEGATIVE
ANION GAP SERPL CALCULATED.3IONS-SCNC: 11.4 MMOL/L
ANION GAP SERPL CALCULATED.3IONS-SCNC: 12 MMOL/L
ANION GAP SERPL CALCULATED.3IONS-SCNC: 13.2 MMOL/L
ANION GAP SERPL CALCULATED.3IONS-SCNC: 14.8 MMOL/L
ANION GAP SERPL CALCULATED.3IONS-SCNC: 15.6 MMOL/L
ANION GAP SERPL CALCULATED.3IONS-SCNC: 17.5 MMOL/L
ANION GAP SERPL CALCULATED.3IONS-SCNC: 25.9 MMOL/L
ANION GAP SERPL CALCULATED.3IONS-SCNC: 5.3 MMOL/L
ANION GAP SERPL CALCULATED.3IONS-SCNC: 8.6 MMOL/L
ANION GAP SERPL CALCULATED.3IONS-SCNC: 9.2 MMOL/L
ANION GAP SERPL CALCULATED.3IONS-SCNC: 9.7 MMOL/L
ANISOCYTOSIS BLD QL: ABNORMAL
APTT PPP: 39.1 SECONDS (ref 22.7–35.4)
AST SERPL-CCNC: 41 U/L (ref 1–32)
BACTERIA SPEC AEROBE CULT: ABNORMAL
BACTERIA UR QL AUTO: ABNORMAL /HPF
BACTERIA UR QL AUTO: NORMAL /HPF
BARBITURATES UR QL SCN: NEGATIVE
BASOPHILS # BLD AUTO: 0.01 10*3/MM3 (ref 0–0.2)
BASOPHILS # BLD AUTO: 0.02 10*3/MM3 (ref 0–0.2)
BASOPHILS # BLD AUTO: 0.02 10*3/MM3 (ref 0–0.2)
BASOPHILS NFR BLD AUTO: 0.1 % (ref 0–1.5)
BASOPHILS NFR BLD AUTO: 0.3 % (ref 0–1.5)
BASOPHILS NFR BLD AUTO: 0.3 % (ref 0–1.5)
BENZODIAZ UR QL SCN: NEGATIVE
BH CV UPPER VENOUS LEFT AXILLARY AUGMENT: NORMAL
BH CV UPPER VENOUS LEFT AXILLARY COMPETENT: NORMAL
BH CV UPPER VENOUS LEFT AXILLARY COMPRESS: NORMAL
BH CV UPPER VENOUS LEFT AXILLARY PHASIC: NORMAL
BH CV UPPER VENOUS LEFT AXILLARY SPONT: NORMAL
BH CV UPPER VENOUS LEFT BASILIC FOREARM COMPRESS: NORMAL
BH CV UPPER VENOUS LEFT BASILIC UPPER COMPRESS: NORMAL
BH CV UPPER VENOUS LEFT BRACHIAL COMPRESS: NORMAL
BH CV UPPER VENOUS LEFT CEPHALIC FOREARM COMPRESS: NORMAL
BH CV UPPER VENOUS LEFT CEPHALIC UPPER COMPRESS: NORMAL
BH CV UPPER VENOUS LEFT INTERNAL JUGULAR AUGMENT: NORMAL
BH CV UPPER VENOUS LEFT INTERNAL JUGULAR COMPETENT: NORMAL
BH CV UPPER VENOUS LEFT INTERNAL JUGULAR COMPRESS: NORMAL
BH CV UPPER VENOUS LEFT INTERNAL JUGULAR PHASIC: NORMAL
BH CV UPPER VENOUS LEFT INTERNAL JUGULAR SPONT: NORMAL
BH CV UPPER VENOUS LEFT RADIAL COMPRESS: NORMAL
BH CV UPPER VENOUS LEFT SUBCLAVIAN AUGMENT: NORMAL
BH CV UPPER VENOUS LEFT SUBCLAVIAN COMPETENT: NORMAL
BH CV UPPER VENOUS LEFT SUBCLAVIAN COMPRESS: NORMAL
BH CV UPPER VENOUS LEFT SUBCLAVIAN PHASIC: NORMAL
BH CV UPPER VENOUS LEFT SUBCLAVIAN SPONT: NORMAL
BH CV UPPER VENOUS LEFT ULNAR COMPRESS: NORMAL
BH CV UPPER VENOUS RIGHT AXILLARY AUGMENT: NORMAL
BH CV UPPER VENOUS RIGHT AXILLARY COMPETENT: NORMAL
BH CV UPPER VENOUS RIGHT AXILLARY COMPRESS: NORMAL
BH CV UPPER VENOUS RIGHT AXILLARY PHASIC: NORMAL
BH CV UPPER VENOUS RIGHT AXILLARY SPONT: NORMAL
BH CV UPPER VENOUS RIGHT BASILIC FOREARM COMPRESS: NORMAL
BH CV UPPER VENOUS RIGHT BASILIC UPPER COMPRESS: NORMAL
BH CV UPPER VENOUS RIGHT BRACHIAL COMPRESS: NORMAL
BH CV UPPER VENOUS RIGHT CEPHALIC FOREARM COMPRESS: NORMAL
BH CV UPPER VENOUS RIGHT CEPHALIC UPPER COMPRESS: NORMAL
BH CV UPPER VENOUS RIGHT INTERNAL JUGULAR AUGMENT: NORMAL
BH CV UPPER VENOUS RIGHT INTERNAL JUGULAR COMPETENT: NORMAL
BH CV UPPER VENOUS RIGHT INTERNAL JUGULAR COMPRESS: NORMAL
BH CV UPPER VENOUS RIGHT INTERNAL JUGULAR PHASIC: NORMAL
BH CV UPPER VENOUS RIGHT INTERNAL JUGULAR SPONT: NORMAL
BH CV UPPER VENOUS RIGHT RADIAL COMPRESS: NORMAL
BH CV UPPER VENOUS RIGHT SUBCLAVIAN AUGMENT: NORMAL
BH CV UPPER VENOUS RIGHT SUBCLAVIAN COMPETENT: NORMAL
BH CV UPPER VENOUS RIGHT SUBCLAVIAN COMPRESS: NORMAL
BH CV UPPER VENOUS RIGHT SUBCLAVIAN PHASIC: NORMAL
BH CV UPPER VENOUS RIGHT SUBCLAVIAN SPONT: NORMAL
BH CV UPPER VENOUS RIGHT ULNAR COMPRESS: NORMAL
BILIRUB SERPL-MCNC: 0.4 MG/DL (ref 0.1–1.2)
BILIRUB UR QL STRIP: NEGATIVE
BILIRUB UR QL STRIP: NEGATIVE
BUN BLD-MCNC: 10 MG/DL (ref 8–23)
BUN BLD-MCNC: 10 MG/DL (ref 8–23)
BUN BLD-MCNC: 13 MG/DL (ref 8–23)
BUN BLD-MCNC: 17 MG/DL (ref 8–23)
BUN BLD-MCNC: 23 MG/DL (ref 8–23)
BUN BLD-MCNC: 30 MG/DL (ref 8–23)
BUN BLD-MCNC: 34 MG/DL (ref 8–23)
BUN BLD-MCNC: 39 MG/DL (ref 8–23)
BUN BLD-MCNC: 47 MG/DL (ref 8–23)
BUN BLD-MCNC: 8 MG/DL (ref 8–23)
BUN BLD-MCNC: 9 MG/DL (ref 8–23)
BUN/CREAT SERPL: 21.1 (ref 7–25)
BUN/CREAT SERPL: 22 (ref 7–25)
BUN/CREAT SERPL: 29.4 (ref 7–25)
BUN/CREAT SERPL: 31 (ref 7–25)
BUN/CREAT SERPL: 37 (ref 7–25)
BUN/CREAT SERPL: 44.3 (ref 7–25)
BUN/CREAT SERPL: 44.7 (ref 7–25)
BUN/CREAT SERPL: 56.6 (ref 7–25)
BUN/CREAT SERPL: 57.5 (ref 7–25)
BUN/CREAT SERPL: 68.1 (ref 7–25)
BUN/CREAT SERPL: 69.4 (ref 7–25)
CALCIUM SPEC-SCNC: 10.3 MG/DL (ref 8.6–10.5)
CALCIUM SPEC-SCNC: 8.2 MG/DL (ref 8.6–10.5)
CALCIUM SPEC-SCNC: 8.4 MG/DL (ref 8.6–10.5)
CALCIUM SPEC-SCNC: 8.4 MG/DL (ref 8.6–10.5)
CALCIUM SPEC-SCNC: 8.6 MG/DL (ref 8.6–10.5)
CALCIUM SPEC-SCNC: 8.6 MG/DL (ref 8.6–10.5)
CALCIUM SPEC-SCNC: 8.9 MG/DL (ref 8.6–10.5)
CALCIUM SPEC-SCNC: 8.9 MG/DL (ref 8.6–10.5)
CALCIUM SPEC-SCNC: 9.8 MG/DL (ref 8.6–10.5)
CALCIUM SPEC-SCNC: 9.8 MG/DL (ref 8.6–10.5)
CALCIUM SPEC-SCNC: 9.9 MG/DL (ref 8.6–10.5)
CANNABINOIDS SERPL QL: NEGATIVE
CHLORIDE SERPL-SCNC: 100 MMOL/L (ref 98–107)
CHLORIDE SERPL-SCNC: 102 MMOL/L (ref 98–107)
CHLORIDE SERPL-SCNC: 103 MMOL/L (ref 98–107)
CHLORIDE SERPL-SCNC: 104 MMOL/L (ref 98–107)
CHLORIDE SERPL-SCNC: 104 MMOL/L (ref 98–107)
CHLORIDE SERPL-SCNC: 105 MMOL/L (ref 98–107)
CHLORIDE SERPL-SCNC: 93 MMOL/L (ref 98–107)
CHLORIDE SERPL-SCNC: 93 MMOL/L (ref 98–107)
CHLORIDE SERPL-SCNC: 94 MMOL/L (ref 98–107)
CHLORIDE SERPL-SCNC: 96 MMOL/L (ref 98–107)
CHLORIDE SERPL-SCNC: 96 MMOL/L (ref 98–107)
CHOLEST SERPL-MCNC: 154 MG/DL (ref 0–200)
CLARITY UR: ABNORMAL
CLARITY UR: CLEAR
CO2 SERPL-SCNC: 18.1 MMOL/L (ref 22–29)
CO2 SERPL-SCNC: 24.8 MMOL/L (ref 22–29)
CO2 SERPL-SCNC: 25.4 MMOL/L (ref 22–29)
CO2 SERPL-SCNC: 25.8 MMOL/L (ref 22–29)
CO2 SERPL-SCNC: 26.5 MMOL/L (ref 22–29)
CO2 SERPL-SCNC: 27.3 MMOL/L (ref 22–29)
CO2 SERPL-SCNC: 27.4 MMOL/L (ref 22–29)
CO2 SERPL-SCNC: 28.2 MMOL/L (ref 22–29)
CO2 SERPL-SCNC: 29.7 MMOL/L (ref 22–29)
CO2 SERPL-SCNC: 31 MMOL/L (ref 22–29)
CO2 SERPL-SCNC: 33.6 MMOL/L (ref 22–29)
COCAINE UR QL: NEGATIVE
COLOR UR: YELLOW
COLOR UR: YELLOW
CREAT BLD-MCNC: 0.27 MG/DL (ref 0.57–1)
CREAT BLD-MCNC: 0.29 MG/DL (ref 0.57–1)
CREAT BLD-MCNC: 0.34 MG/DL (ref 0.57–1)
CREAT BLD-MCNC: 0.37 MG/DL (ref 0.57–1)
CREAT BLD-MCNC: 0.38 MG/DL (ref 0.57–1)
CREAT BLD-MCNC: 0.38 MG/DL (ref 0.57–1)
CREAT BLD-MCNC: 0.4 MG/DL (ref 0.57–1)
CREAT BLD-MCNC: 0.49 MG/DL (ref 0.57–1)
CREAT BLD-MCNC: 0.53 MG/DL (ref 0.57–1)
CREAT BLD-MCNC: 0.59 MG/DL (ref 0.57–1)
CREAT BLD-MCNC: 0.69 MG/DL (ref 0.57–1)
CREAT BLD-MCNC: 0.88 MG/DL (ref 0.57–1)
DACRYOCYTES BLD QL SMEAR: ABNORMAL
DEPRECATED RDW RBC AUTO: 51.2 FL (ref 37–54)
DEPRECATED RDW RBC AUTO: 51.3 FL (ref 37–54)
DEPRECATED RDW RBC AUTO: 51.9 FL (ref 37–54)
DEPRECATED RDW RBC AUTO: 52.4 FL (ref 37–54)
DEPRECATED RDW RBC AUTO: 52.8 FL (ref 37–54)
DEPRECATED RDW RBC AUTO: 52.9 FL (ref 37–54)
DEPRECATED RDW RBC AUTO: 53.4 FL (ref 37–54)
DEPRECATED RDW RBC AUTO: 53.7 FL (ref 37–54)
DEPRECATED RDW RBC AUTO: 53.8 FL (ref 37–54)
DEPRECATED RDW RBC AUTO: 53.8 FL (ref 37–54)
DEPRECATED RDW RBC AUTO: 54.5 FL (ref 37–54)
EOSINOPHIL # BLD AUTO: 0 10*3/MM3 (ref 0–0.7)
EOSINOPHIL # BLD AUTO: 0.05 10*3/MM3 (ref 0–0.7)
EOSINOPHIL # BLD AUTO: 0.06 10*3/MM3 (ref 0–0.7)
EOSINOPHIL # BLD AUTO: 0.12 10*3/MM3 (ref 0–0.7)
EOSINOPHIL # BLD AUTO: 0.14 10*3/MM3 (ref 0–0.7)
EOSINOPHIL # BLD AUTO: 0.18 10*3/MM3 (ref 0–0.7)
EOSINOPHIL # BLD AUTO: 0.19 10*3/MM3 (ref 0–0.7)
EOSINOPHIL # BLD AUTO: 0.29 10*3/MM3 (ref 0–0.7)
EOSINOPHIL NFR BLD AUTO: 0 % (ref 0.3–6.2)
EOSINOPHIL NFR BLD AUTO: 0.6 % (ref 0.3–6.2)
EOSINOPHIL NFR BLD AUTO: 0.7 % (ref 0.3–6.2)
EOSINOPHIL NFR BLD AUTO: 1.3 % (ref 0.3–6.2)
EOSINOPHIL NFR BLD AUTO: 1.5 % (ref 0.3–6.2)
EOSINOPHIL NFR BLD AUTO: 1.8 % (ref 0.3–6.2)
EOSINOPHIL NFR BLD AUTO: 2.4 % (ref 0.3–6.2)
EOSINOPHIL NFR BLD AUTO: 3.4 % (ref 0.3–6.2)
ERYTHROCYTE [DISTWIDTH] IN BLOOD BY AUTOMATED COUNT: 15.9 % (ref 11.7–13)
ERYTHROCYTE [DISTWIDTH] IN BLOOD BY AUTOMATED COUNT: 16.1 % (ref 11.7–13)
ERYTHROCYTE [DISTWIDTH] IN BLOOD BY AUTOMATED COUNT: 16.1 % (ref 11.7–13)
ERYTHROCYTE [DISTWIDTH] IN BLOOD BY AUTOMATED COUNT: 16.2 % (ref 11.7–13)
ERYTHROCYTE [DISTWIDTH] IN BLOOD BY AUTOMATED COUNT: 16.3 % (ref 11.7–13)
ERYTHROCYTE [DISTWIDTH] IN BLOOD BY AUTOMATED COUNT: 16.3 % (ref 11.7–13)
ERYTHROCYTE [DISTWIDTH] IN BLOOD BY AUTOMATED COUNT: 16.4 % (ref 11.7–13)
ERYTHROCYTE [DISTWIDTH] IN BLOOD BY AUTOMATED COUNT: 16.6 % (ref 11.7–13)
ERYTHROCYTE [DISTWIDTH] IN BLOOD BY AUTOMATED COUNT: 16.7 % (ref 11.7–13)
ETHANOL BLD-MCNC: <10 MG/DL (ref 0–10)
ETHANOL UR QL: <0.01 %
FIBRINOGEN PPP-MCNC: 606 MG/DL (ref 219–464)
FOLATE SERPL-MCNC: >20 NG/ML (ref 4.78–24.2)
GFR SERPL CREATININE-BSD FRML MDRD: 109 ML/MIN/1.73
GFR SERPL CREATININE-BSD FRML MDRD: 119 ML/MIN/1.73
GFR SERPL CREATININE-BSD FRML MDRD: 61 ML/MIN/1.73
GFR SERPL CREATININE-BSD FRML MDRD: 80 ML/MIN/1.73
GFR SERPL CREATININE-BSD FRML MDRD: 96 ML/MIN/1.73
GFR SERPL CREATININE-BSD FRML MDRD: >150 ML/MIN/1.73
GLOBULIN UR ELPH-MCNC: 3.1 GM/DL
GLUCOSE BLD-MCNC: 116 MG/DL (ref 65–99)
GLUCOSE BLD-MCNC: 122 MG/DL (ref 65–99)
GLUCOSE BLD-MCNC: 127 MG/DL (ref 65–99)
GLUCOSE BLD-MCNC: 134 MG/DL (ref 65–99)
GLUCOSE BLD-MCNC: 143 MG/DL (ref 65–99)
GLUCOSE BLD-MCNC: 157 MG/DL (ref 65–99)
GLUCOSE BLD-MCNC: 158 MG/DL (ref 65–99)
GLUCOSE BLD-MCNC: 161 MG/DL (ref 65–99)
GLUCOSE BLD-MCNC: 167 MG/DL (ref 65–99)
GLUCOSE BLD-MCNC: 206 MG/DL (ref 65–99)
GLUCOSE BLD-MCNC: 93 MG/DL (ref 65–99)
GLUCOSE BLDC GLUCOMTR-MCNC: 107 MG/DL (ref 70–130)
GLUCOSE UR STRIP-MCNC: NEGATIVE MG/DL
GLUCOSE UR STRIP-MCNC: NEGATIVE MG/DL
HCT VFR BLD AUTO: 30.9 % (ref 35.6–45.5)
HCT VFR BLD AUTO: 31.7 % (ref 35.6–45.5)
HCT VFR BLD AUTO: 33.1 % (ref 35.6–45.5)
HCT VFR BLD AUTO: 37.6 % (ref 35.6–45.5)
HCT VFR BLD AUTO: 37.8 % (ref 35.6–45.5)
HCT VFR BLD AUTO: 38.4 % (ref 35.6–45.5)
HCT VFR BLD AUTO: 42.1 % (ref 35.6–45.5)
HCT VFR BLD AUTO: 42.6 % (ref 35.6–45.5)
HCT VFR BLD AUTO: 42.9 % (ref 35.6–45.5)
HCT VFR BLD AUTO: 44.9 % (ref 35.6–45.5)
HCT VFR BLD AUTO: 46.9 % (ref 35.6–45.5)
HDLC SERPL-MCNC: 76 MG/DL (ref 40–60)
HGB BLD-MCNC: 10 G/DL (ref 11.9–15.5)
HGB BLD-MCNC: 10.2 G/DL (ref 11.9–15.5)
HGB BLD-MCNC: 11.6 G/DL (ref 11.9–15.5)
HGB BLD-MCNC: 12 G/DL (ref 11.9–15.5)
HGB BLD-MCNC: 12.9 G/DL (ref 11.9–15.5)
HGB BLD-MCNC: 13.2 G/DL (ref 11.9–15.5)
HGB BLD-MCNC: 13.5 G/DL (ref 11.9–15.5)
HGB BLD-MCNC: 13.5 G/DL (ref 11.9–15.5)
HGB BLD-MCNC: 15 G/DL (ref 11.9–15.5)
HGB BLD-MCNC: 15.2 G/DL (ref 11.9–15.5)
HGB BLD-MCNC: 9.6 G/DL (ref 11.9–15.5)
HGB UR QL STRIP.AUTO: ABNORMAL
HGB UR QL STRIP.AUTO: ABNORMAL
HYALINE CASTS UR QL AUTO: ABNORMAL /LPF
HYALINE CASTS UR QL AUTO: NORMAL /LPF
IMM GRANULOCYTES # BLD: 0.01 10*3/MM3 (ref 0–0.03)
IMM GRANULOCYTES # BLD: 0.02 10*3/MM3 (ref 0–0.03)
IMM GRANULOCYTES # BLD: 0.03 10*3/MM3 (ref 0–0.03)
IMM GRANULOCYTES NFR BLD: 0.1 % (ref 0–0.5)
IMM GRANULOCYTES NFR BLD: 0.2 % (ref 0–0.5)
IMM GRANULOCYTES NFR BLD: 0.3 % (ref 0–0.5)
IMM GRANULOCYTES NFR BLD: 0.3 % (ref 0–0.5)
INR PPP: 1.38 (ref 0.9–1.1)
INR PPP: 1.45 (ref 0.9–1.1)
INR PPP: 1.52 (ref 0.9–1.1)
INR PPP: 1.63 (ref 0.9–1.1)
INR PPP: 1.63 (ref 0.9–1.1)
INR PPP: 1.65 (ref 0.9–1.1)
INR PPP: 1.69 (ref 0.9–1.1)
INR PPP: 1.77 (ref 0.9–1.1)
INR PPP: 1.91 (ref 0.9–1.1)
INR PPP: 1.96 (ref 0.9–1.1)
INR PPP: 2.02 (ref 0.9–1.1)
INR PPP: 2.03 (ref 0.9–1.1)
INR PPP: 2.14 (ref 0.9–1.1)
INR PPP: 2.29 (ref 0.9–1.1)
INR PPP: 2.33 (ref 0.9–1.1)
INR PPP: 2.41 (ref 0.9–1.1)
INR PPP: 2.5 (ref 0.9–1.1)
INR PPP: 2.52 (ref 0.9–1.1)
INR PPP: 2.63 (ref 0.9–1.1)
INR PPP: 2.68 (ref 0.9–1.1)
INR PPP: 2.71 (ref 0.9–1.1)
INR PPP: 2.76 (ref 0.9–1.1)
INR PPP: 2.77 (ref 0.9–1.1)
INR PPP: 2.85 (ref 0.9–1.1)
INR PPP: 2.93 (ref 0.9–1.1)
INR PPP: 2.99 (ref 0.9–1.1)
INR PPP: 3.09 (ref 0.9–1.1)
INR PPP: 3.09 (ref 0.9–1.1)
INR PPP: 3.2 (ref 0.9–1.1)
INR PPP: 3.46 (ref 0.9–1.1)
INR PPP: 3.53 (ref 0.9–1.1)
INR PPP: 3.58 (ref 0.9–1.1)
KETONES UR QL STRIP: ABNORMAL
KETONES UR QL STRIP: ABNORMAL
LDLC SERPL CALC-MCNC: 68 MG/DL (ref 0–100)
LDLC/HDLC SERPL: 0.9 {RATIO}
LEUKOCYTE ESTERASE UR QL STRIP.AUTO: ABNORMAL
LEUKOCYTE ESTERASE UR QL STRIP.AUTO: NEGATIVE
LYMPHOCYTES # BLD AUTO: 0.77 10*3/MM3 (ref 0.9–4.8)
LYMPHOCYTES # BLD AUTO: 1.1 10*3/MM3 (ref 0.9–4.8)
LYMPHOCYTES # BLD AUTO: 1.13 10*3/MM3 (ref 0.9–4.8)
LYMPHOCYTES # BLD AUTO: 1.4 10*3/MM3 (ref 0.9–4.8)
LYMPHOCYTES # BLD AUTO: 1.5 10*3/MM3 (ref 0.9–4.8)
LYMPHOCYTES # BLD AUTO: 1.52 10*3/MM3 (ref 0.9–4.8)
LYMPHOCYTES # BLD AUTO: 1.55 10*3/MM3 (ref 0.9–4.8)
LYMPHOCYTES # BLD AUTO: 1.6 10*3/MM3 (ref 0.9–4.8)
LYMPHOCYTES # BLD AUTO: 1.79 10*3/MM3 (ref 0.9–4.8)
LYMPHOCYTES # BLD AUTO: 1.88 10*3/MM3 (ref 0.9–4.8)
LYMPHOCYTES # BLD MANUAL: 1.17 10*3/MM3 (ref 0.9–4.8)
LYMPHOCYTES NFR BLD AUTO: 14.2 % (ref 19.6–45.3)
LYMPHOCYTES NFR BLD AUTO: 15.1 % (ref 19.6–45.3)
LYMPHOCYTES NFR BLD AUTO: 17.1 % (ref 19.6–45.3)
LYMPHOCYTES NFR BLD AUTO: 18 % (ref 19.6–45.3)
LYMPHOCYTES NFR BLD AUTO: 18.3 % (ref 19.6–45.3)
LYMPHOCYTES NFR BLD AUTO: 20 % (ref 19.6–45.3)
LYMPHOCYTES NFR BLD AUTO: 24.7 % (ref 19.6–45.3)
LYMPHOCYTES NFR BLD AUTO: 7.7 % (ref 19.6–45.3)
LYMPHOCYTES NFR BLD AUTO: 8.6 % (ref 19.6–45.3)
LYMPHOCYTES NFR BLD AUTO: 8.7 % (ref 19.6–45.3)
LYMPHOCYTES NFR BLD MANUAL: 12 % (ref 19.6–45.3)
LYMPHOCYTES NFR BLD MANUAL: 8 % (ref 5–12)
Lab: NORMAL
MAGNESIUM SERPL-MCNC: 2.2 MG/DL (ref 1.6–2.4)
MCH RBC QN AUTO: 27.6 PG (ref 26.9–32)
MCH RBC QN AUTO: 27.7 PG (ref 26.9–32)
MCH RBC QN AUTO: 27.9 PG (ref 26.9–32)
MCH RBC QN AUTO: 28.1 PG (ref 26.9–32)
MCH RBC QN AUTO: 28.2 PG (ref 26.9–32)
MCH RBC QN AUTO: 28.4 PG (ref 26.9–32)
MCH RBC QN AUTO: 28.8 PG (ref 26.9–32)
MCH RBC QN AUTO: 29.2 PG (ref 26.9–32)
MCH RBC QN AUTO: 29.5 PG (ref 26.9–32)
MCHC RBC AUTO-ENTMCNC: 30.8 G/DL (ref 32.4–36.3)
MCHC RBC AUTO-ENTMCNC: 30.9 G/DL (ref 32.4–36.3)
MCHC RBC AUTO-ENTMCNC: 31.1 G/DL (ref 32.4–36.3)
MCHC RBC AUTO-ENTMCNC: 31.4 G/DL (ref 32.4–36.3)
MCHC RBC AUTO-ENTMCNC: 31.5 G/DL (ref 32.4–36.3)
MCHC RBC AUTO-ENTMCNC: 31.5 G/DL (ref 32.4–36.3)
MCHC RBC AUTO-ENTMCNC: 31.7 G/DL (ref 32.4–36.3)
MCHC RBC AUTO-ENTMCNC: 31.7 G/DL (ref 32.4–36.3)
MCHC RBC AUTO-ENTMCNC: 32.4 G/DL (ref 32.4–36.3)
MCHC RBC AUTO-ENTMCNC: 33.4 G/DL (ref 32.4–36.3)
MCHC RBC AUTO-ENTMCNC: 33.6 G/DL (ref 32.4–36.3)
MCV RBC AUTO: 87.4 FL (ref 80.5–98.2)
MCV RBC AUTO: 87.7 FL (ref 80.5–98.2)
MCV RBC AUTO: 88.5 FL (ref 80.5–98.2)
MCV RBC AUTO: 88.8 FL (ref 80.5–98.2)
MCV RBC AUTO: 88.9 FL (ref 80.5–98.2)
MCV RBC AUTO: 89.3 FL (ref 80.5–98.2)
MCV RBC AUTO: 89.5 FL (ref 80.5–98.2)
MCV RBC AUTO: 89.5 FL (ref 80.5–98.2)
MCV RBC AUTO: 89.6 FL (ref 80.5–98.2)
MCV RBC AUTO: 90.5 FL (ref 80.5–98.2)
MCV RBC AUTO: 90.7 FL (ref 80.5–98.2)
METHADONE UR QL SCN: NEGATIVE
METHYLMALONATE SERPL-SCNC: 122 NMOL/L (ref 0–378)
MONOCYTES # BLD AUTO: 0.63 10*3/MM3 (ref 0.2–1.2)
MONOCYTES # BLD AUTO: 0.78 10*3/MM3 (ref 0.2–1.2)
MONOCYTES # BLD AUTO: 0.91 10*3/MM3 (ref 0.2–1.2)
MONOCYTES # BLD AUTO: 1.01 10*3/MM3 (ref 0.2–1.2)
MONOCYTES # BLD AUTO: 1.11 10*3/MM3 (ref 0.2–1.2)
MONOCYTES # BLD AUTO: 1.15 10*3/MM3 (ref 0.2–1.2)
MONOCYTES # BLD AUTO: 1.43 10*3/MM3 (ref 0.2–1.2)
MONOCYTES # BLD AUTO: 1.69 10*3/MM3 (ref 0.2–1.2)
MONOCYTES # BLD AUTO: 1.7 10*3/MM3 (ref 0.2–1.2)
MONOCYTES # BLD AUTO: 1.74 10*3/MM3 (ref 0.2–1.2)
MONOCYTES # BLD AUTO: 1.93 10*3/MM3 (ref 0.2–1.2)
MONOCYTES NFR BLD AUTO: 10.8 % (ref 5–12)
MONOCYTES NFR BLD AUTO: 11.9 % (ref 5–12)
MONOCYTES NFR BLD AUTO: 12.9 % (ref 5–12)
MONOCYTES NFR BLD AUTO: 13 % (ref 5–12)
MONOCYTES NFR BLD AUTO: 13.5 % (ref 5–12)
MONOCYTES NFR BLD AUTO: 13.6 % (ref 5–12)
MONOCYTES NFR BLD AUTO: 14.3 % (ref 5–12)
MONOCYTES NFR BLD AUTO: 16.8 % (ref 5–12)
MONOCYTES NFR BLD AUTO: 20.5 % (ref 5–12)
MONOCYTES NFR BLD AUTO: 7 % (ref 5–12)
NEUTROPHILS # BLD AUTO: 10.2 10*3/MM3 (ref 1.9–8.1)
NEUTROPHILS # BLD AUTO: 11.21 10*3/MM3 (ref 1.9–8.1)
NEUTROPHILS # BLD AUTO: 4.76 10*3/MM3 (ref 1.9–8.1)
NEUTROPHILS # BLD AUTO: 4.9 10*3/MM3 (ref 1.9–8.1)
NEUTROPHILS # BLD AUTO: 5.02 10*3/MM3 (ref 1.9–8.1)
NEUTROPHILS # BLD AUTO: 5.87 10*3/MM3 (ref 1.9–8.1)
NEUTROPHILS # BLD AUTO: 6.55 10*3/MM3 (ref 1.9–8.1)
NEUTROPHILS # BLD AUTO: 6.61 10*3/MM3 (ref 1.9–8.1)
NEUTROPHILS # BLD AUTO: 7.51 10*3/MM3 (ref 1.9–8.1)
NEUTROPHILS # BLD AUTO: 7.55 10*3/MM3 (ref 1.9–8.1)
NEUTROPHILS # BLD AUTO: 7.8 10*3/MM3 (ref 1.9–8.1)
NEUTROPHILS NFR BLD AUTO: 57.7 % (ref 42.7–76)
NEUTROPHILS NFR BLD AUTO: 62.4 % (ref 42.7–76)
NEUTROPHILS NFR BLD AUTO: 64.7 % (ref 42.7–76)
NEUTROPHILS NFR BLD AUTO: 65.7 % (ref 42.7–76)
NEUTROPHILS NFR BLD AUTO: 65.9 % (ref 42.7–76)
NEUTROPHILS NFR BLD AUTO: 70.3 % (ref 42.7–76)
NEUTROPHILS NFR BLD AUTO: 71.3 % (ref 42.7–76)
NEUTROPHILS NFR BLD AUTO: 78.2 % (ref 42.7–76)
NEUTROPHILS NFR BLD AUTO: 78.5 % (ref 42.7–76)
NEUTROPHILS NFR BLD AUTO: 84.1 % (ref 42.7–76)
NEUTROPHILS NFR BLD MANUAL: 80 % (ref 42.7–76)
NITRITE UR QL STRIP: NEGATIVE
NITRITE UR QL STRIP: NEGATIVE
OPIATES UR QL: NEGATIVE
OXYCODONE UR QL SCN: NEGATIVE
PH UR STRIP.AUTO: 5.5 [PH] (ref 5–8)
PH UR STRIP.AUTO: 6 [PH] (ref 5–8)
PHOSPHATE SERPL-MCNC: 2.7 MG/DL (ref 2.5–4.5)
PLAT MORPH BLD: NORMAL
PLAT MORPH BLD: NORMAL
PLATELET # BLD AUTO: 418 10*3/MM3 (ref 140–500)
PLATELET # BLD AUTO: 429 10*3/MM3 (ref 140–500)
PLATELET # BLD AUTO: 440 10*3/MM3 (ref 140–500)
PLATELET # BLD AUTO: 475 10*3/MM3 (ref 140–500)
PLATELET # BLD AUTO: 510 10*3/MM3 (ref 140–500)
PLATELET # BLD AUTO: 533 10*3/MM3 (ref 140–500)
PLATELET # BLD AUTO: 564 10*3/MM3 (ref 140–500)
PLATELET # BLD AUTO: 569 10*3/MM3 (ref 140–500)
PLATELET # BLD AUTO: 632 10*3/MM3 (ref 140–500)
PLATELET # BLD AUTO: 66 10*3/MM3 (ref 140–500)
PLATELET # BLD AUTO: 666 10*3/MM3 (ref 140–500)
PLATELET # BLD AUTO: 666 10*3/MM3 (ref 140–500)
PLATELETS.RETICULATED NFR BLD AUTO: 1.9 % (ref 0.9–6.5)
PMV BLD AUTO: 10 FL (ref 6–12)
PMV BLD AUTO: 10.3 FL (ref 6–12)
PMV BLD AUTO: 10.4 FL (ref 6–12)
PMV BLD AUTO: 10.4 FL (ref 6–12)
PMV BLD AUTO: 10.5 FL (ref 6–12)
PMV BLD AUTO: 10.5 FL (ref 6–12)
PMV BLD AUTO: 10.6 FL (ref 6–12)
PMV BLD AUTO: 10.7 FL (ref 6–12)
PMV BLD AUTO: 10.8 FL (ref 6–12)
PMV BLD AUTO: 11 FL (ref 6–12)
PMV BLD AUTO: 9.3 FL (ref 6–12)
POTASSIUM BLD-SCNC: 3.3 MMOL/L (ref 3.5–5.2)
POTASSIUM BLD-SCNC: 3.4 MMOL/L (ref 3.5–5.2)
POTASSIUM BLD-SCNC: 3.4 MMOL/L (ref 3.5–5.2)
POTASSIUM BLD-SCNC: 3.5 MMOL/L (ref 3.5–5.2)
POTASSIUM BLD-SCNC: 3.6 MMOL/L (ref 3.5–5.2)
POTASSIUM BLD-SCNC: 3.7 MMOL/L (ref 3.5–5.2)
POTASSIUM BLD-SCNC: 3.8 MMOL/L (ref 3.5–5.2)
POTASSIUM BLD-SCNC: 3.9 MMOL/L (ref 3.5–5.2)
POTASSIUM BLD-SCNC: 4.6 MMOL/L (ref 3.5–5.2)
PROT SERPL-MCNC: 7.3 G/DL (ref 6–8.5)
PROT UR QL STRIP: ABNORMAL
PROT UR QL STRIP: NEGATIVE
PROTHROMBIN TIME: 16.7 SECONDS (ref 11.7–14.2)
PROTHROMBIN TIME: 17.4 SECONDS (ref 11.7–14.2)
PROTHROMBIN TIME: 18 SECONDS (ref 11.7–14.2)
PROTHROMBIN TIME: 19 SECONDS (ref 11.7–14.2)
PROTHROMBIN TIME: 19 SECONDS (ref 11.7–14.2)
PROTHROMBIN TIME: 19.2 SECONDS (ref 11.7–14.2)
PROTHROMBIN TIME: 19.6 SECONDS (ref 11.7–14.2)
PROTHROMBIN TIME: 20.4 SECONDS (ref 11.7–14.2)
PROTHROMBIN TIME: 21.6 SECONDS (ref 11.7–14.2)
PROTHROMBIN TIME: 22 SECONDS (ref 11.7–14.2)
PROTHROMBIN TIME: 22.5 SECONDS (ref 11.7–14.2)
PROTHROMBIN TIME: 22.6 SECONDS (ref 11.7–14.2)
PROTHROMBIN TIME: 23.6 SECONDS (ref 11.7–14.2)
PROTHROMBIN TIME: 24.8 SECONDS (ref 11.7–14.2)
PROTHROMBIN TIME: 25.2 SECONDS (ref 11.7–14.2)
PROTHROMBIN TIME: 25.8 SECONDS (ref 11.7–14.2)
PROTHROMBIN TIME: 26.6 SECONDS (ref 11.7–14.2)
PROTHROMBIN TIME: 26.7 SECONDS (ref 11.7–14.2)
PROTHROMBIN TIME: 27.7 SECONDS (ref 11.7–14.2)
PROTHROMBIN TIME: 28.1 SECONDS (ref 11.7–14.2)
PROTHROMBIN TIME: 28.3 SECONDS (ref 11.7–14.2)
PROTHROMBIN TIME: 28.7 SECONDS (ref 11.7–14.2)
PROTHROMBIN TIME: 28.8 SECONDS (ref 11.7–14.2)
PROTHROMBIN TIME: 29.5 SECONDS (ref 11.7–14.2)
PROTHROMBIN TIME: 30.1 SECONDS (ref 11.7–14.2)
PROTHROMBIN TIME: 30.6 SECONDS (ref 11.7–14.2)
PROTHROMBIN TIME: 31.4 SECONDS (ref 11.7–14.2)
PROTHROMBIN TIME: 31.4 SECONDS (ref 11.7–14.2)
PROTHROMBIN TIME: 32.3 SECONDS (ref 11.7–14.2)
PROTHROMBIN TIME: 34.3 SECONDS (ref 11.7–14.2)
PROTHROMBIN TIME: 34.8 SECONDS (ref 11.7–14.2)
PROTHROMBIN TIME: 35.2 SECONDS (ref 11.7–14.2)
RBC # BLD AUTO: 3.46 10*6/MM3 (ref 3.9–5.2)
RBC # BLD AUTO: 3.54 10*6/MM3 (ref 3.9–5.2)
RBC # BLD AUTO: 3.65 10*6/MM3 (ref 3.9–5.2)
RBC # BLD AUTO: 4.2 10*6/MM3 (ref 3.9–5.2)
RBC # BLD AUTO: 4.27 10*6/MM3 (ref 3.9–5.2)
RBC # BLD AUTO: 4.38 10*6/MM3 (ref 3.9–5.2)
RBC # BLD AUTO: 4.65 10*6/MM3 (ref 3.9–5.2)
RBC # BLD AUTO: 4.79 10*6/MM3 (ref 3.9–5.2)
RBC # BLD AUTO: 4.79 10*6/MM3 (ref 3.9–5.2)
RBC # BLD AUTO: 5.14 10*6/MM3 (ref 3.9–5.2)
RBC # BLD AUTO: 5.28 10*6/MM3 (ref 3.9–5.2)
RBC # UR: ABNORMAL /HPF
RBC # UR: NORMAL /HPF
REF LAB TEST METHOD: ABNORMAL
REF LAB TEST METHOD: NORMAL
SODIUM BLD-SCNC: 135 MMOL/L (ref 136–145)
SODIUM BLD-SCNC: 136 MMOL/L (ref 136–145)
SODIUM BLD-SCNC: 137 MMOL/L (ref 136–145)
SODIUM BLD-SCNC: 137 MMOL/L (ref 136–145)
SODIUM BLD-SCNC: 138 MMOL/L (ref 136–145)
SODIUM BLD-SCNC: 139 MMOL/L (ref 136–145)
SODIUM BLD-SCNC: 140 MMOL/L (ref 136–145)
SODIUM BLD-SCNC: 146 MMOL/L (ref 136–145)
SODIUM BLD-SCNC: 147 MMOL/L (ref 136–145)
SP GR UR STRIP: 1.01 (ref 1–1.03)
SP GR UR STRIP: 1.02 (ref 1–1.03)
SQUAMOUS #/AREA URNS HPF: ABNORMAL /HPF
SQUAMOUS #/AREA URNS HPF: NORMAL /HPF
T3FREE SERPL-MCNC: 3.53 PG/ML (ref 2–4.4)
T4 FREE SERPL-MCNC: 1.12 NG/DL (ref 0.93–1.7)
T4 FREE SERPL-MCNC: 1.52 NG/DL (ref 0.93–1.7)
TRIGL SERPL-MCNC: 48 MG/DL (ref 0–150)
TROPONIN T SERPL-MCNC: <0.01 NG/ML (ref 0–0.03)
TSH SERPL DL<=0.05 MIU/L-ACNC: 0.03 MIU/ML (ref 0.27–4.2)
TSH SERPL DL<=0.05 MIU/L-ACNC: 0.36 MIU/ML (ref 0.27–4.2)
UROBILINOGEN UR QL STRIP: ABNORMAL
UROBILINOGEN UR QL STRIP: ABNORMAL
VANCOMYCIN SERPL-MCNC: 9.4 MCG/ML (ref 5–40)
VANCOMYCIN TROUGH SERPL-MCNC: 9.7 MCG/ML (ref 5–20)
VIT B12 BLD-MCNC: >2000 PG/ML (ref 211–946)
VLDLC SERPL-MCNC: 9.6 MG/DL (ref 5–40)
WBC MORPH BLD: NORMAL
WBC MORPH BLD: NORMAL
WBC NRBC COR # BLD: 10.04 10*3/MM3 (ref 4.5–10.7)
WBC NRBC COR # BLD: 10.53 10*3/MM3 (ref 4.5–10.7)
WBC NRBC COR # BLD: 13.04 10*3/MM3 (ref 4.5–10.7)
WBC NRBC COR # BLD: 14.28 10*3/MM3 (ref 4.5–10.7)
WBC NRBC COR # BLD: 7.62 10*3/MM3 (ref 4.5–10.7)
WBC NRBC COR # BLD: 7.76 10*3/MM3 (ref 4.5–10.7)
WBC NRBC COR # BLD: 8.49 10*3/MM3 (ref 4.5–10.7)
WBC NRBC COR # BLD: 8.94 10*3/MM3 (ref 4.5–10.7)
WBC NRBC COR # BLD: 8.98 10*3/MM3 (ref 4.5–10.7)
WBC NRBC COR # BLD: 9.33 10*3/MM3 (ref 4.5–10.7)
WBC NRBC COR # BLD: 9.75 10*3/MM3 (ref 4.5–10.7)
WBC UR QL AUTO: ABNORMAL /HPF
WBC UR QL AUTO: NORMAL /HPF

## 2018-01-01 PROCEDURE — 85025 COMPLETE CBC W/AUTO DIFF WBC: CPT | Performed by: HOSPITALIST

## 2018-01-01 PROCEDURE — 85610 PROTHROMBIN TIME: CPT | Performed by: INTERNAL MEDICINE

## 2018-01-01 PROCEDURE — 25010000002 VANCOMYCIN 10 G RECONSTITUTED SOLUTION: Performed by: HOSPITALIST

## 2018-01-01 PROCEDURE — 90791 PSYCH DIAGNOSTIC EVALUATION: CPT | Performed by: SOCIAL WORKER

## 2018-01-01 PROCEDURE — 84132 ASSAY OF SERUM POTASSIUM: CPT | Performed by: HOSPITALIST

## 2018-01-01 PROCEDURE — 80048 BASIC METABOLIC PNL TOTAL CA: CPT | Performed by: HOSPITALIST

## 2018-01-01 PROCEDURE — 80307 DRUG TEST PRSMV CHEM ANLYZR: CPT | Performed by: EMERGENCY MEDICINE

## 2018-01-01 PROCEDURE — 85025 COMPLETE CBC W/AUTO DIFF WBC: CPT | Performed by: NURSE PRACTITIONER

## 2018-01-01 PROCEDURE — 84439 ASSAY OF FREE THYROXINE: CPT | Performed by: HOSPITALIST

## 2018-01-01 PROCEDURE — 97110 THERAPEUTIC EXERCISES: CPT

## 2018-01-01 PROCEDURE — 85007 BL SMEAR W/DIFF WBC COUNT: CPT | Performed by: HOSPITALIST

## 2018-01-01 PROCEDURE — 25010000002 LORAZEPAM PER 2 MG: Performed by: INTERNAL MEDICINE

## 2018-01-01 PROCEDURE — 92526 ORAL FUNCTION THERAPY: CPT

## 2018-01-01 PROCEDURE — 80061 LIPID PANEL: CPT | Performed by: SPECIALIST

## 2018-01-01 PROCEDURE — 85610 PROTHROMBIN TIME: CPT | Performed by: HOSPITALIST

## 2018-01-01 PROCEDURE — 81001 URINALYSIS AUTO W/SCOPE: CPT | Performed by: HOSPITALIST

## 2018-01-01 PROCEDURE — 84100 ASSAY OF PHOSPHORUS: CPT | Performed by: HOSPITALIST

## 2018-01-01 PROCEDURE — 93005 ELECTROCARDIOGRAM TRACING: CPT | Performed by: NURSE PRACTITIONER

## 2018-01-01 PROCEDURE — 97162 PT EVAL MOD COMPLEX 30 MIN: CPT | Performed by: PHYSICAL THERAPIST

## 2018-01-01 PROCEDURE — 74018 RADEX ABDOMEN 1 VIEW: CPT

## 2018-01-01 PROCEDURE — 84443 ASSAY THYROID STIM HORMONE: CPT | Performed by: EMERGENCY MEDICINE

## 2018-01-01 PROCEDURE — 99223 1ST HOSP IP/OBS HIGH 75: CPT | Performed by: INTERNAL MEDICINE

## 2018-01-01 PROCEDURE — 25010000002 VANCOMYCIN PER 500 MG: Performed by: HOSPITALIST

## 2018-01-01 PROCEDURE — 82746 ASSAY OF FOLIC ACID SERUM: CPT | Performed by: HOSPITALIST

## 2018-01-01 PROCEDURE — 83921 ORGANIC ACID SINGLE QUANT: CPT | Performed by: INTERNAL MEDICINE

## 2018-01-01 PROCEDURE — 84484 ASSAY OF TROPONIN QUANT: CPT | Performed by: NURSE PRACTITIONER

## 2018-01-01 PROCEDURE — 80202 ASSAY OF VANCOMYCIN: CPT | Performed by: HOSPITALIST

## 2018-01-01 PROCEDURE — 71045 X-RAY EXAM CHEST 1 VIEW: CPT

## 2018-01-01 PROCEDURE — 85610 PROTHROMBIN TIME: CPT | Performed by: SPECIALIST

## 2018-01-01 PROCEDURE — 84481 FREE ASSAY (FT-3): CPT | Performed by: HOSPITALIST

## 2018-01-01 PROCEDURE — 94799 UNLISTED PULMONARY SVC/PX: CPT

## 2018-01-01 PROCEDURE — 25010000002 LORAZEPAM PER 2 MG: Performed by: EMERGENCY MEDICINE

## 2018-01-01 PROCEDURE — 97110 THERAPEUTIC EXERCISES: CPT | Performed by: PHYSICAL THERAPIST

## 2018-01-01 PROCEDURE — 82962 GLUCOSE BLOOD TEST: CPT

## 2018-01-01 PROCEDURE — 87077 CULTURE AEROBIC IDENTIFY: CPT | Performed by: HOSPITALIST

## 2018-01-01 PROCEDURE — 92610 EVALUATE SWALLOWING FUNCTION: CPT

## 2018-01-01 PROCEDURE — 25010000002 CEFTRIAXONE PER 250 MG: Performed by: HOSPITALIST

## 2018-01-01 PROCEDURE — 85730 THROMBOPLASTIN TIME PARTIAL: CPT | Performed by: INTERNAL MEDICINE

## 2018-01-01 PROCEDURE — 25010000002 ONDANSETRON PER 1 MG: Performed by: HOSPITALIST

## 2018-01-01 PROCEDURE — 97161 PT EVAL LOW COMPLEX 20 MIN: CPT | Performed by: PHYSICAL THERAPIST

## 2018-01-01 PROCEDURE — 85055 RETICULATED PLATELET ASSAY: CPT | Performed by: INTERNAL MEDICINE

## 2018-01-01 PROCEDURE — 93010 ELECTROCARDIOGRAM REPORT: CPT | Performed by: INTERNAL MEDICINE

## 2018-01-01 PROCEDURE — 81001 URINALYSIS AUTO W/SCOPE: CPT | Performed by: NURSE PRACTITIONER

## 2018-01-01 PROCEDURE — 99233 SBSQ HOSP IP/OBS HIGH 50: CPT | Performed by: INTERNAL MEDICINE

## 2018-01-01 PROCEDURE — 85384 FIBRINOGEN ACTIVITY: CPT | Performed by: INTERNAL MEDICINE

## 2018-01-01 PROCEDURE — 25010000002 MORPHINE PER 10 MG: Performed by: INTERNAL MEDICINE

## 2018-01-01 PROCEDURE — 83735 ASSAY OF MAGNESIUM: CPT | Performed by: HOSPITALIST

## 2018-01-01 PROCEDURE — 82565 ASSAY OF CREATININE: CPT | Performed by: HOSPITALIST

## 2018-01-01 PROCEDURE — 74176 CT ABD & PELVIS W/O CONTRAST: CPT

## 2018-01-01 PROCEDURE — 87086 URINE CULTURE/COLONY COUNT: CPT | Performed by: HOSPITALIST

## 2018-01-01 PROCEDURE — 82607 VITAMIN B-12: CPT | Performed by: HOSPITALIST

## 2018-01-01 PROCEDURE — 80053 COMPREHEN METABOLIC PANEL: CPT | Performed by: NURSE PRACTITIONER

## 2018-01-01 PROCEDURE — 85610 PROTHROMBIN TIME: CPT | Performed by: NURSE PRACTITIONER

## 2018-01-01 PROCEDURE — 84443 ASSAY THYROID STIM HORMONE: CPT | Performed by: HOSPITALIST

## 2018-01-01 PROCEDURE — 96372 THER/PROPH/DIAG INJ SC/IM: CPT

## 2018-01-01 PROCEDURE — 84439 ASSAY OF FREE THYROXINE: CPT | Performed by: EMERGENCY MEDICINE

## 2018-01-01 PROCEDURE — 99283 EMERGENCY DEPT VISIT LOW MDM: CPT

## 2018-01-01 PROCEDURE — 99231 SBSQ HOSP IP/OBS SF/LOW 25: CPT | Performed by: INTERNAL MEDICINE

## 2018-01-01 RX ORDER — LORAZEPAM 1 MG/1
0.5 TABLET ORAL NIGHTLY
Status: DISPENSED | OUTPATIENT
Start: 2018-01-01 | End: 2018-01-01

## 2018-01-01 RX ORDER — DIPHENOXYLATE HYDROCHLORIDE AND ATROPINE SULFATE 2.5; .025 MG/1; MG/1
1 TABLET ORAL DAILY
Status: DISCONTINUED | OUTPATIENT
Start: 2018-01-01 | End: 2018-01-01

## 2018-01-01 RX ORDER — ECHINACEA PURPUREA EXTRACT 125 MG
2 TABLET ORAL AS NEEDED
Status: DISCONTINUED | OUTPATIENT
Start: 2018-01-01 | End: 2018-01-01 | Stop reason: HOSPADM

## 2018-01-01 RX ORDER — PROMETHAZINE HYDROCHLORIDE 6.25 MG/5ML
6.25 SYRUP ORAL EVERY 4 HOURS PRN
Status: DISCONTINUED | OUTPATIENT
Start: 2018-01-01 | End: 2018-01-01 | Stop reason: HOSPADM

## 2018-01-01 RX ORDER — ALUMINA, MAGNESIA, AND SIMETHICONE 2400; 2400; 240 MG/30ML; MG/30ML; MG/30ML
15 SUSPENSION ORAL EVERY 6 HOURS PRN
Status: DISCONTINUED | OUTPATIENT
Start: 2018-01-01 | End: 2018-01-01 | Stop reason: HOSPADM

## 2018-01-01 RX ORDER — MORPHINE SULFATE 20 MG/ML
5 SOLUTION ORAL
Status: DISCONTINUED | OUTPATIENT
Start: 2018-01-01 | End: 2018-01-01 | Stop reason: HOSPADM

## 2018-01-01 RX ORDER — WARFARIN SODIUM 4 MG/1
4 TABLET ORAL
Status: DISCONTINUED | OUTPATIENT
Start: 2018-01-01 | End: 2018-01-01

## 2018-01-01 RX ORDER — UREA 10 %
3 LOTION (ML) TOPICAL NIGHTLY PRN
Status: DISCONTINUED | OUTPATIENT
Start: 2018-01-01 | End: 2018-01-01 | Stop reason: HOSPADM

## 2018-01-01 RX ORDER — SENNA AND DOCUSATE SODIUM 50; 8.6 MG/1; MG/1
2 TABLET, FILM COATED ORAL 2 TIMES DAILY PRN
Status: DISCONTINUED | OUTPATIENT
Start: 2018-01-01 | End: 2018-01-01 | Stop reason: HOSPADM

## 2018-01-01 RX ORDER — BISACODYL 10 MG
10 SUPPOSITORY, RECTAL RECTAL DAILY
Status: DISCONTINUED | OUTPATIENT
Start: 2018-01-01 | End: 2018-01-01 | Stop reason: HOSPADM

## 2018-01-01 RX ORDER — LORAZEPAM 0.5 MG/1
0.25 TABLET ORAL 2 TIMES DAILY PRN
Status: DISCONTINUED | OUTPATIENT
Start: 2018-01-01 | End: 2018-01-01

## 2018-01-01 RX ORDER — LORAZEPAM 0.5 MG/1
0.25 TABLET ORAL 2 TIMES DAILY PRN
Status: DISCONTINUED | OUTPATIENT
Start: 2018-01-01 | End: 2018-01-01 | Stop reason: HOSPADM

## 2018-01-01 RX ORDER — SODIUM CHLORIDE 0.9 % (FLUSH) 0.9 %
1-10 SYRINGE (ML) INJECTION AS NEEDED
Status: DISCONTINUED | OUTPATIENT
Start: 2018-01-01 | End: 2018-01-01

## 2018-01-01 RX ORDER — FLUOXETINE HYDROCHLORIDE 20 MG/1
20 CAPSULE ORAL DAILY
Status: DISCONTINUED | OUTPATIENT
Start: 2018-01-01 | End: 2018-01-01

## 2018-01-01 RX ORDER — ACETAMINOPHEN 650 MG/1
650 SUPPOSITORY RECTAL EVERY 4 HOURS PRN
Status: DISCONTINUED | OUTPATIENT
Start: 2018-01-01 | End: 2018-01-01 | Stop reason: HOSPADM

## 2018-01-01 RX ORDER — HALOPERIDOL 1 MG/1
1 TABLET ORAL EVERY 4 HOURS PRN
Status: DISCONTINUED | OUTPATIENT
Start: 2018-01-01 | End: 2018-01-01 | Stop reason: HOSPADM

## 2018-01-01 RX ORDER — POTASSIUM CHLORIDE 7.45 MG/ML
10 INJECTION INTRAVENOUS
Status: DISCONTINUED | OUTPATIENT
Start: 2018-01-01 | End: 2018-01-01

## 2018-01-01 RX ORDER — OLANZAPINE 10 MG/1
10 TABLET ORAL NIGHTLY
Status: DISCONTINUED | OUTPATIENT
Start: 2018-01-01 | End: 2018-01-01

## 2018-01-01 RX ORDER — LORAZEPAM 0.5 MG/1
0.25 TABLET ORAL 2 TIMES DAILY
Status: DISCONTINUED | OUTPATIENT
Start: 2018-01-01 | End: 2018-01-01

## 2018-01-01 RX ORDER — LORAZEPAM 0.5 MG/1
0.5 TABLET ORAL
COMMUNITY

## 2018-01-01 RX ORDER — ACETAMINOPHEN 325 MG/1
650 TABLET ORAL EVERY 4 HOURS PRN
Status: DISCONTINUED | OUTPATIENT
Start: 2018-01-01 | End: 2018-01-01 | Stop reason: HOSPADM

## 2018-01-01 RX ORDER — GLYCOPYRROLATE 0.2 MG/ML
0.4 INJECTION INTRAMUSCULAR; INTRAVENOUS
Status: DISCONTINUED | OUTPATIENT
Start: 2018-01-01 | End: 2018-01-01 | Stop reason: HOSPADM

## 2018-01-01 RX ORDER — ARIPIPRAZOLE 5 MG/1
5 TABLET ORAL DAILY
Status: DISCONTINUED | OUTPATIENT
Start: 2018-01-01 | End: 2018-01-01 | Stop reason: HOSPADM

## 2018-01-01 RX ORDER — PROMETHAZINE HYDROCHLORIDE 12.5 MG/1
6.25 SUPPOSITORY RECTAL EVERY 4 HOURS PRN
Status: DISCONTINUED | OUTPATIENT
Start: 2018-01-01 | End: 2018-01-01 | Stop reason: HOSPADM

## 2018-01-01 RX ORDER — SCOLOPAMINE TRANSDERMAL SYSTEM 1 MG/1
1 PATCH, EXTENDED RELEASE TRANSDERMAL
Status: DISCONTINUED | OUTPATIENT
Start: 2018-01-01 | End: 2018-01-01 | Stop reason: HOSPADM

## 2018-01-01 RX ORDER — LORAZEPAM 2 MG/ML
0.5 CONCENTRATE ORAL
Status: DISCONTINUED | OUTPATIENT
Start: 2018-01-01 | End: 2018-01-01 | Stop reason: HOSPADM

## 2018-01-01 RX ORDER — POTASSIUM CHLORIDE 750 MG/1
40 CAPSULE, EXTENDED RELEASE ORAL AS NEEDED
Status: DISCONTINUED | OUTPATIENT
Start: 2018-01-01 | End: 2018-01-01 | Stop reason: HOSPADM

## 2018-01-01 RX ORDER — GLYCOPYRROLATE 0.2 MG/ML
0.2 INJECTION INTRAMUSCULAR; INTRAVENOUS
Status: DISCONTINUED | OUTPATIENT
Start: 2018-01-01 | End: 2018-01-01 | Stop reason: HOSPADM

## 2018-01-01 RX ORDER — WARFARIN SODIUM 5 MG/1
5 TABLET ORAL
Status: DISCONTINUED | OUTPATIENT
Start: 2018-01-01 | End: 2018-01-01

## 2018-01-01 RX ORDER — OLANZAPINE 5 MG/1
5 TABLET, ORALLY DISINTEGRATING ORAL 2 TIMES DAILY
Status: DISCONTINUED | OUTPATIENT
Start: 2018-01-01 | End: 2018-01-01

## 2018-01-01 RX ORDER — DEXTROSE MONOHYDRATE 50 MG/ML
75 INJECTION, SOLUTION INTRAVENOUS CONTINUOUS
Status: DISCONTINUED | OUTPATIENT
Start: 2018-01-01 | End: 2018-01-01

## 2018-01-01 RX ORDER — LORAZEPAM 2 MG/ML
0.25 INJECTION INTRAMUSCULAR 2 TIMES DAILY PRN
Status: DISCONTINUED | OUTPATIENT
Start: 2018-01-01 | End: 2018-01-01

## 2018-01-01 RX ORDER — POTASSIUM CHLORIDE 750 MG/1
40 CAPSULE, EXTENDED RELEASE ORAL AS NEEDED
Status: DISCONTINUED | OUTPATIENT
Start: 2018-01-01 | End: 2018-01-01

## 2018-01-01 RX ORDER — PROMETHAZINE HYDROCHLORIDE 25 MG/1
6.25 TABLET ORAL EVERY 4 HOURS PRN
Status: DISCONTINUED | OUTPATIENT
Start: 2018-01-01 | End: 2018-01-01 | Stop reason: HOSPADM

## 2018-01-01 RX ORDER — SENNOSIDES 8.6 MG
1300 CAPSULE ORAL DAILY
COMMUNITY

## 2018-01-01 RX ORDER — OLANZAPINE 10 MG/1
7.5 INJECTION, POWDER, LYOPHILIZED, FOR SOLUTION INTRAMUSCULAR EVERY 6 HOURS PRN
Status: DISCONTINUED | OUTPATIENT
Start: 2018-01-01 | End: 2018-01-01

## 2018-01-01 RX ORDER — WARFARIN SODIUM 3 MG/1
3 TABLET ORAL
Status: DISCONTINUED | OUTPATIENT
Start: 2018-01-01 | End: 2018-01-01

## 2018-01-01 RX ORDER — LORAZEPAM 2 MG/ML
0.5 INJECTION INTRAMUSCULAR EVERY 8 HOURS
Status: DISCONTINUED | OUTPATIENT
Start: 2018-01-01 | End: 2018-01-01 | Stop reason: HOSPADM

## 2018-01-01 RX ORDER — LORAZEPAM 2 MG/ML
1 INJECTION INTRAMUSCULAR
Status: DISCONTINUED | OUTPATIENT
Start: 2018-01-01 | End: 2018-01-01 | Stop reason: HOSPADM

## 2018-01-01 RX ORDER — POTASSIUM CHLORIDE 7.45 MG/ML
10 INJECTION INTRAVENOUS
Status: DISCONTINUED | OUTPATIENT
Start: 2018-01-01 | End: 2018-01-01 | Stop reason: HOSPADM

## 2018-01-01 RX ORDER — OLANZAPINE 5 MG/1
5 TABLET ORAL NIGHTLY
Status: DISCONTINUED | OUTPATIENT
Start: 2018-01-01 | End: 2018-01-01

## 2018-01-01 RX ORDER — LORAZEPAM 2 MG/ML
1 CONCENTRATE ORAL
Status: DISCONTINUED | OUTPATIENT
Start: 2018-01-01 | End: 2018-01-01 | Stop reason: HOSPADM

## 2018-01-01 RX ORDER — BISACODYL 10 MG
10 SUPPOSITORY, RECTAL RECTAL DAILY PRN
Status: DISCONTINUED | OUTPATIENT
Start: 2018-01-01 | End: 2018-01-01 | Stop reason: HOSPADM

## 2018-01-01 RX ORDER — OLANZAPINE 10 MG/1
10 INJECTION, POWDER, LYOPHILIZED, FOR SOLUTION INTRAMUSCULAR ONCE
Status: COMPLETED | OUTPATIENT
Start: 2018-01-01 | End: 2018-01-01

## 2018-01-01 RX ORDER — ACETAMINOPHEN 160 MG/5ML
650 SOLUTION ORAL EVERY 4 HOURS PRN
Status: DISCONTINUED | OUTPATIENT
Start: 2018-01-01 | End: 2018-01-01 | Stop reason: HOSPADM

## 2018-01-01 RX ORDER — LORAZEPAM 2 MG/ML
2 CONCENTRATE ORAL
Status: DISCONTINUED | OUTPATIENT
Start: 2018-01-01 | End: 2018-01-01 | Stop reason: HOSPADM

## 2018-01-01 RX ORDER — BUPROPION HYDROCHLORIDE 75 MG/1
75 TABLET ORAL 3 TIMES DAILY
Status: DISCONTINUED | OUTPATIENT
Start: 2018-01-01 | End: 2018-01-01 | Stop reason: HOSPADM

## 2018-01-01 RX ORDER — DEXTROSE, SODIUM CHLORIDE, AND POTASSIUM CHLORIDE 5; .45; .075 G/100ML; G/100ML; G/100ML
75 INJECTION INTRAVENOUS CONTINUOUS
Status: DISCONTINUED | OUTPATIENT
Start: 2018-01-01 | End: 2018-01-01 | Stop reason: HOSPADM

## 2018-01-01 RX ORDER — LORAZEPAM 0.5 MG/1
0.5 TABLET ORAL NIGHTLY PRN
Status: DISCONTINUED | OUTPATIENT
Start: 2018-01-01 | End: 2018-01-01

## 2018-01-01 RX ORDER — DIPHENOXYLATE HYDROCHLORIDE AND ATROPINE SULFATE 2.5; .025 MG/1; MG/1
1 TABLET ORAL
Status: DISCONTINUED | OUTPATIENT
Start: 2018-01-01 | End: 2018-01-01 | Stop reason: HOSPADM

## 2018-01-01 RX ORDER — ACETAMINOPHEN 650 MG/1
650 SUPPOSITORY RECTAL EVERY 6 HOURS PRN
Status: DISCONTINUED | OUTPATIENT
Start: 2018-01-01 | End: 2018-01-01

## 2018-01-01 RX ORDER — OLANZAPINE 10 MG/1
7.5 INJECTION, POWDER, LYOPHILIZED, FOR SOLUTION INTRAMUSCULAR EVERY 6 HOURS PRN
Status: COMPLETED | OUTPATIENT
Start: 2018-01-01 | End: 2018-01-01

## 2018-01-01 RX ORDER — VANCOMYCIN HYDROCHLORIDE 1 G/200ML
1000 INJECTION, SOLUTION INTRAVENOUS EVERY 24 HOURS
Status: DISCONTINUED | OUTPATIENT
Start: 2018-01-01 | End: 2018-01-01 | Stop reason: DRUGHIGH

## 2018-01-01 RX ORDER — ALUMINA, MAGNESIA, AND SIMETHICONE 2400; 2400; 240 MG/30ML; MG/30ML; MG/30ML
15 SUSPENSION ORAL EVERY 6 HOURS PRN
Status: DISCONTINUED | OUTPATIENT
Start: 2018-01-01 | End: 2018-01-01

## 2018-01-01 RX ORDER — ARIPIPRAZOLE 5 MG/1
5 TABLET ORAL NIGHTLY
Status: DISCONTINUED | OUTPATIENT
Start: 2018-01-01 | End: 2018-01-01 | Stop reason: HOSPADM

## 2018-01-01 RX ORDER — WARFARIN SODIUM 5 MG/1
5 TABLET ORAL
Status: COMPLETED | OUTPATIENT
Start: 2018-01-01 | End: 2018-01-01

## 2018-01-01 RX ORDER — LORAZEPAM 0.5 MG/1
0.5 TABLET ORAL NIGHTLY PRN
Status: DISCONTINUED | OUTPATIENT
Start: 2018-01-01 | End: 2018-01-01 | Stop reason: HOSPADM

## 2018-01-01 RX ORDER — LORAZEPAM 0.5 MG/1
0.25 TABLET ORAL 2 TIMES DAILY
COMMUNITY

## 2018-01-01 RX ORDER — OLANZAPINE 7.5 MG/1
7.5 TABLET ORAL NIGHTLY
Status: DISCONTINUED | OUTPATIENT
Start: 2018-01-01 | End: 2018-01-01

## 2018-01-01 RX ORDER — PROMETHAZINE HYDROCHLORIDE 25 MG/ML
6.25 INJECTION, SOLUTION INTRAMUSCULAR; INTRAVENOUS EVERY 4 HOURS PRN
Status: DISCONTINUED | OUTPATIENT
Start: 2018-01-01 | End: 2018-01-01 | Stop reason: HOSPADM

## 2018-01-01 RX ORDER — FLUOXETINE 10 MG/1
10 CAPSULE ORAL DAILY
Status: DISCONTINUED | OUTPATIENT
Start: 2018-01-01 | End: 2018-01-01

## 2018-01-01 RX ORDER — LORAZEPAM 0.5 MG/1
0.25 TABLET ORAL 2 TIMES DAILY
Status: DISPENSED | OUTPATIENT
Start: 2018-01-01 | End: 2018-01-01

## 2018-01-01 RX ORDER — SODIUM CHLORIDE 9 MG/ML
50 INJECTION, SOLUTION INTRAVENOUS CONTINUOUS
Status: DISCONTINUED | OUTPATIENT
Start: 2018-01-01 | End: 2018-01-01

## 2018-01-01 RX ORDER — LORAZEPAM 2 MG/ML
1 INJECTION INTRAMUSCULAR ONCE
Status: COMPLETED | OUTPATIENT
Start: 2018-01-01 | End: 2018-01-01

## 2018-01-01 RX ORDER — HALOPERIDOL 1 MG/1
2 TABLET ORAL EVERY 4 HOURS PRN
Status: DISCONTINUED | OUTPATIENT
Start: 2018-01-01 | End: 2018-01-01 | Stop reason: HOSPADM

## 2018-01-01 RX ORDER — ONDANSETRON 2 MG/ML
4 INJECTION INTRAMUSCULAR; INTRAVENOUS EVERY 6 HOURS PRN
Status: DISCONTINUED | OUTPATIENT
Start: 2018-01-01 | End: 2018-01-01 | Stop reason: HOSPADM

## 2018-01-01 RX ORDER — ARIPIPRAZOLE 5 MG/1
5 TABLET ORAL NIGHTLY
Status: DISCONTINUED | OUTPATIENT
Start: 2018-01-01 | End: 2018-01-01

## 2018-01-01 RX ORDER — LORAZEPAM 2 MG/ML
0.5 INJECTION INTRAMUSCULAR NIGHTLY PRN
Status: DISCONTINUED | OUTPATIENT
Start: 2018-01-01 | End: 2018-01-01

## 2018-01-01 RX ORDER — MORPHINE SULFATE 20 MG/ML
20 SOLUTION ORAL
Status: DISCONTINUED | OUTPATIENT
Start: 2018-01-01 | End: 2018-01-01 | Stop reason: HOSPADM

## 2018-01-01 RX ORDER — OLANZAPINE 10 MG/1
10 INJECTION, POWDER, LYOPHILIZED, FOR SOLUTION INTRAMUSCULAR EVERY 8 HOURS PRN
Status: DISCONTINUED | OUTPATIENT
Start: 2018-01-01 | End: 2018-01-01 | Stop reason: HOSPADM

## 2018-01-01 RX ORDER — MORPHINE SULFATE 20 MG/ML
10 SOLUTION ORAL
Status: DISCONTINUED | OUTPATIENT
Start: 2018-01-01 | End: 2018-01-01 | Stop reason: HOSPADM

## 2018-01-01 RX ORDER — SENNA AND DOCUSATE SODIUM 50; 8.6 MG/1; MG/1
2 TABLET, FILM COATED ORAL NIGHTLY
Status: DISCONTINUED | OUTPATIENT
Start: 2018-01-01 | End: 2018-01-01

## 2018-01-01 RX ORDER — BUPROPION HYDROCHLORIDE 75 MG/1
75 TABLET ORAL 3 TIMES DAILY
Status: DISCONTINUED | OUTPATIENT
Start: 2018-01-01 | End: 2018-01-01

## 2018-01-01 RX ORDER — DEXTROSE, SODIUM CHLORIDE, AND POTASSIUM CHLORIDE 5; .45; .075 G/100ML; G/100ML; G/100ML
75 INJECTION INTRAVENOUS CONTINUOUS
Status: DISCONTINUED | OUTPATIENT
Start: 2018-01-01 | End: 2018-01-01

## 2018-01-01 RX ORDER — PROMETHAZINE HYDROCHLORIDE 25 MG/1
12.5 TABLET ORAL EVERY 4 HOURS PRN
Status: DISCONTINUED | OUTPATIENT
Start: 2018-01-01 | End: 2018-01-01 | Stop reason: HOSPADM

## 2018-01-01 RX ORDER — HALOPERIDOL 2 MG/ML
1 SOLUTION ORAL EVERY 4 HOURS PRN
Status: DISCONTINUED | OUTPATIENT
Start: 2018-01-01 | End: 2018-01-01 | Stop reason: HOSPADM

## 2018-01-01 RX ORDER — LORAZEPAM 2 MG/ML
2 INJECTION INTRAMUSCULAR
Status: DISCONTINUED | OUTPATIENT
Start: 2018-01-01 | End: 2018-01-01 | Stop reason: HOSPADM

## 2018-01-01 RX ORDER — OLANZAPINE 7.5 MG/1
15 TABLET ORAL NIGHTLY
Status: DISCONTINUED | OUTPATIENT
Start: 2018-01-01 | End: 2018-01-01

## 2018-01-01 RX ORDER — HALOPERIDOL 5 MG/ML
1 INJECTION INTRAMUSCULAR EVERY 4 HOURS PRN
Status: DISCONTINUED | OUTPATIENT
Start: 2018-01-01 | End: 2018-01-01 | Stop reason: HOSPADM

## 2018-01-01 RX ORDER — POTASSIUM CHLORIDE 1.5 G/1.77G
40 POWDER, FOR SOLUTION ORAL AS NEEDED
Status: DISCONTINUED | OUTPATIENT
Start: 2018-01-01 | End: 2018-01-01

## 2018-01-01 RX ORDER — HALOPERIDOL 5 MG/ML
2 INJECTION INTRAMUSCULAR EVERY 4 HOURS PRN
Status: DISCONTINUED | OUTPATIENT
Start: 2018-01-01 | End: 2018-01-01 | Stop reason: HOSPADM

## 2018-01-01 RX ORDER — HALOPERIDOL 2 MG/ML
2 SOLUTION ORAL EVERY 4 HOURS PRN
Status: DISCONTINUED | OUTPATIENT
Start: 2018-01-01 | End: 2018-01-01 | Stop reason: HOSPADM

## 2018-01-01 RX ORDER — UREA 10 %
3 LOTION (ML) TOPICAL NIGHTLY PRN
Status: DISCONTINUED | OUTPATIENT
Start: 2018-01-01 | End: 2018-01-01

## 2018-01-01 RX ORDER — POTASSIUM CHLORIDE 1.5 G/1.77G
40 POWDER, FOR SOLUTION ORAL AS NEEDED
Status: DISCONTINUED | OUTPATIENT
Start: 2018-01-01 | End: 2018-01-01 | Stop reason: HOSPADM

## 2018-01-01 RX ORDER — FUROSEMIDE 20 MG/1
20 TABLET ORAL DAILY
Status: DISCONTINUED | OUTPATIENT
Start: 2018-01-01 | End: 2018-01-01

## 2018-01-01 RX ORDER — MORPHINE SULFATE 10 MG/ML
6 INJECTION INTRAMUSCULAR; INTRAVENOUS; SUBCUTANEOUS
Status: DISCONTINUED | OUTPATIENT
Start: 2018-01-01 | End: 2018-01-01 | Stop reason: HOSPADM

## 2018-01-01 RX ORDER — FUROSEMIDE 20 MG/1
20 TABLET ORAL DAILY
Status: DISPENSED | OUTPATIENT
Start: 2018-01-01 | End: 2018-01-01

## 2018-01-01 RX ORDER — BISACODYL 10 MG
10 SUPPOSITORY, RECTAL RECTAL DAILY
Status: DISCONTINUED | OUTPATIENT
Start: 2018-01-01 | End: 2018-01-01

## 2018-01-01 RX ORDER — LORAZEPAM 0.5 MG/1
0.5 TABLET ORAL NIGHTLY
Status: DISCONTINUED | OUTPATIENT
Start: 2018-01-01 | End: 2018-01-01

## 2018-01-01 RX ORDER — SENNOSIDES 8.6 MG
1300 CAPSULE ORAL DAILY
Status: DISCONTINUED | OUTPATIENT
Start: 2018-01-01 | End: 2018-01-01 | Stop reason: HOSPADM

## 2018-01-01 RX ORDER — OLANZAPINE 5 MG/1
5 TABLET, ORALLY DISINTEGRATING ORAL ONCE
Status: COMPLETED | OUTPATIENT
Start: 2018-01-01 | End: 2018-01-01

## 2018-01-01 RX ORDER — PROMETHAZINE HYDROCHLORIDE 6.25 MG/5ML
12.5 SYRUP ORAL EVERY 4 HOURS PRN
Status: DISCONTINUED | OUTPATIENT
Start: 2018-01-01 | End: 2018-01-01 | Stop reason: HOSPADM

## 2018-01-01 RX ORDER — PROMETHAZINE HYDROCHLORIDE 25 MG/ML
12.5 INJECTION, SOLUTION INTRAMUSCULAR; INTRAVENOUS EVERY 4 HOURS PRN
Status: DISCONTINUED | OUTPATIENT
Start: 2018-01-01 | End: 2018-01-01 | Stop reason: HOSPADM

## 2018-01-01 RX ORDER — CHOLECALCIFEROL (VITAMIN D3) 125 MCG
5 CAPSULE ORAL NIGHTLY PRN
Status: DISCONTINUED | OUTPATIENT
Start: 2018-01-01 | End: 2018-01-01 | Stop reason: HOSPADM

## 2018-01-01 RX ORDER — HYDROMORPHONE HCL 110MG/55ML
1.5 PATIENT CONTROLLED ANALGESIA SYRINGE INTRAVENOUS
Status: DISCONTINUED | OUTPATIENT
Start: 2018-01-01 | End: 2018-01-01 | Stop reason: HOSPADM

## 2018-01-01 RX ORDER — PROMETHAZINE HYDROCHLORIDE 12.5 MG/1
12.5 SUPPOSITORY RECTAL EVERY 4 HOURS PRN
Status: DISCONTINUED | OUTPATIENT
Start: 2018-01-01 | End: 2018-01-01 | Stop reason: HOSPADM

## 2018-01-01 RX ORDER — VANCOMYCIN HYDROCHLORIDE 1 G/200ML
1000 INJECTION, SOLUTION INTRAVENOUS EVERY 24 HOURS
Status: DISCONTINUED | OUTPATIENT
Start: 2018-01-01 | End: 2018-01-01 | Stop reason: HOSPADM

## 2018-01-01 RX ORDER — CEFTRIAXONE SODIUM 1 G/50ML
1 INJECTION, SOLUTION INTRAVENOUS EVERY 24 HOURS
Status: DISCONTINUED | OUTPATIENT
Start: 2018-01-01 | End: 2018-01-01

## 2018-01-01 RX ORDER — LORAZEPAM 2 MG/ML
0.5 INJECTION INTRAMUSCULAR
Status: DISCONTINUED | OUTPATIENT
Start: 2018-01-01 | End: 2018-01-01 | Stop reason: HOSPADM

## 2018-01-01 RX ORDER — SENNA AND DOCUSATE SODIUM 50; 8.6 MG/1; MG/1
2 TABLET, FILM COATED ORAL NIGHTLY
Status: DISCONTINUED | OUTPATIENT
Start: 2018-01-01 | End: 2018-01-01 | Stop reason: HOSPADM

## 2018-01-01 RX ORDER — DEXTROSE, SODIUM CHLORIDE, AND POTASSIUM CHLORIDE 5; .9; .15 G/100ML; G/100ML; G/100ML
75 INJECTION INTRAVENOUS CONTINUOUS
Status: DISCONTINUED | OUTPATIENT
Start: 2018-01-01 | End: 2018-01-01

## 2018-01-01 RX ORDER — DIPHENOXYLATE HYDROCHLORIDE AND ATROPINE SULFATE 2.5; .025 MG/1; MG/1
1 TABLET ORAL DAILY
Status: DISCONTINUED | OUTPATIENT
Start: 2018-01-01 | End: 2018-01-01 | Stop reason: HOSPADM

## 2018-01-01 RX ADMIN — BUPROPION HYDROCHLORIDE 75 MG: 75 TABLET, FILM COATED ORAL at 17:16

## 2018-01-01 RX ADMIN — LORAZEPAM 0.5 MG: 2 INJECTION INTRAMUSCULAR; INTRAVENOUS at 15:52

## 2018-01-01 RX ADMIN — FLUOXETINE HYDROCHLORIDE 10 MG: 10 CAPSULE ORAL at 09:28

## 2018-01-01 RX ADMIN — Medication 1300 MG: at 09:59

## 2018-01-01 RX ADMIN — CEFTRIAXONE SODIUM 1 G: 1 INJECTION, SOLUTION INTRAVENOUS at 18:34

## 2018-01-01 RX ADMIN — BUPROPION HYDROCHLORIDE 75 MG: 75 TABLET, FILM COATED ORAL at 21:07

## 2018-01-01 RX ADMIN — FUROSEMIDE 20 MG: 20 TABLET ORAL at 08:27

## 2018-01-01 RX ADMIN — ACETAMINOPHEN 650 MG: 325 TABLET, FILM COATED ORAL at 20:24

## 2018-01-01 RX ADMIN — Medication 1 TABLET: at 14:10

## 2018-01-01 RX ADMIN — BUPROPION HYDROCHLORIDE 75 MG: 75 TABLET, FILM COATED ORAL at 08:15

## 2018-01-01 RX ADMIN — POTASSIUM CHLORIDE, DEXTROSE MONOHYDRATE AND SODIUM CHLORIDE 75 ML/HR: 150; 5; 900 INJECTION, SOLUTION INTRAVENOUS at 07:28

## 2018-01-01 RX ADMIN — OLANZAPINE 15 MG: 7.5 TABLET, FILM COATED ORAL at 20:13

## 2018-01-01 RX ADMIN — BUPROPION HYDROCHLORIDE 75 MG: 75 TABLET, FILM COATED ORAL at 16:36

## 2018-01-01 RX ADMIN — POTASSIUM CHLORIDE, DEXTROSE MONOHYDRATE AND SODIUM CHLORIDE 75 ML/HR: 75; 5; 450 INJECTION, SOLUTION INTRAVENOUS at 12:56

## 2018-01-01 RX ADMIN — LORAZEPAM 0.25 MG: 0.5 TABLET ORAL at 09:54

## 2018-01-01 RX ADMIN — POTASSIUM CHLORIDE, DEXTROSE MONOHYDRATE AND SODIUM CHLORIDE 75 ML/HR: 75; 5; 450 INJECTION, SOLUTION INTRAVENOUS at 10:44

## 2018-01-01 RX ADMIN — Medication 1300 MG: at 11:10

## 2018-01-01 RX ADMIN — FLUOXETINE HYDROCHLORIDE 10 MG: 10 CAPSULE ORAL at 08:56

## 2018-01-01 RX ADMIN — BUPROPION HYDROCHLORIDE 75 MG: 75 TABLET, FILM COATED ORAL at 20:43

## 2018-01-01 RX ADMIN — BUPROPION HYDROCHLORIDE 75 MG: 75 TABLET, FILM COATED ORAL at 09:00

## 2018-01-01 RX ADMIN — WARFARIN SODIUM 4 MG: 4 TABLET ORAL at 17:16

## 2018-01-01 RX ADMIN — LORAZEPAM 1 MG: 2 INJECTION INTRAMUSCULAR; INTRAVENOUS at 21:02

## 2018-01-01 RX ADMIN — FLUOXETINE HYDROCHLORIDE 10 MG: 10 CAPSULE ORAL at 08:55

## 2018-01-01 RX ADMIN — FUROSEMIDE 20 MG: 20 TABLET ORAL at 10:11

## 2018-01-01 RX ADMIN — LORAZEPAM 0.5 MG: 0.5 TABLET ORAL at 20:28

## 2018-01-01 RX ADMIN — BUPROPION HYDROCHLORIDE 75 MG: 75 TABLET, FILM COATED ORAL at 15:36

## 2018-01-01 RX ADMIN — Medication 1300 MG: at 08:15

## 2018-01-01 RX ADMIN — BISACODYL 10 MG: 10 SUPPOSITORY RECTAL at 18:16

## 2018-01-01 RX ADMIN — FUROSEMIDE 20 MG: 20 TABLET ORAL at 11:44

## 2018-01-01 RX ADMIN — OLANZAPINE 5 MG: 5 TABLET ORAL at 20:04

## 2018-01-01 RX ADMIN — LORAZEPAM 1 MG: 2 INJECTION INTRAMUSCULAR; INTRAVENOUS at 09:45

## 2018-01-01 RX ADMIN — FUROSEMIDE 20 MG: 20 TABLET ORAL at 13:04

## 2018-01-01 RX ADMIN — FLUOXETINE HYDROCHLORIDE 10 MG: 10 CAPSULE ORAL at 08:59

## 2018-01-01 RX ADMIN — FUROSEMIDE 20 MG: 20 TABLET ORAL at 09:01

## 2018-01-01 RX ADMIN — MAGNESIUM HYDROXIDE 10 ML: 2400 SUSPENSION ORAL at 15:52

## 2018-01-01 RX ADMIN — POTASSIUM CHLORIDE 40 MEQ: 750 CAPSULE, EXTENDED RELEASE ORAL at 15:35

## 2018-01-01 RX ADMIN — OLANZAPINE 10 MG: 10 INJECTION, POWDER, FOR SOLUTION INTRAMUSCULAR at 02:20

## 2018-01-01 RX ADMIN — BUPROPION HYDROCHLORIDE 75 MG: 75 TABLET, FILM COATED ORAL at 22:09

## 2018-01-01 RX ADMIN — BUPROPION HYDROCHLORIDE 75 MG: 75 TABLET, FILM COATED ORAL at 08:56

## 2018-01-01 RX ADMIN — OLANZAPINE 7.5 MG: 5 TABLET ORAL at 21:08

## 2018-01-01 RX ADMIN — BUPROPION HYDROCHLORIDE 75 MG: 75 TABLET, FILM COATED ORAL at 09:57

## 2018-01-01 RX ADMIN — FLUOXETINE HYDROCHLORIDE 10 MG: 10 CAPSULE ORAL at 11:10

## 2018-01-01 RX ADMIN — LORAZEPAM 0.5 MG: 2 INJECTION INTRAMUSCULAR; INTRAVENOUS at 23:59

## 2018-01-01 RX ADMIN — OLANZAPINE 10 MG: 10 INJECTION, POWDER, FOR SOLUTION INTRAMUSCULAR at 20:40

## 2018-01-01 RX ADMIN — Medication 1300 MG: at 09:53

## 2018-01-01 RX ADMIN — LORAZEPAM 1 MG: 2 INJECTION INTRAMUSCULAR; INTRAVENOUS at 00:40

## 2018-01-01 RX ADMIN — BUPROPION HYDROCHLORIDE 75 MG: 75 TABLET, FILM COATED ORAL at 17:20

## 2018-01-01 RX ADMIN — LORAZEPAM 0.5 MG: 0.5 TABLET ORAL at 20:43

## 2018-01-01 RX ADMIN — POTASSIUM CHLORIDE 40 MEQ: 750 CAPSULE, EXTENDED RELEASE ORAL at 19:24

## 2018-01-01 RX ADMIN — LORAZEPAM 1 MG: 2 INJECTION INTRAMUSCULAR; INTRAVENOUS at 04:35

## 2018-01-01 RX ADMIN — Medication 1 TABLET: at 09:57

## 2018-01-01 RX ADMIN — LORAZEPAM 0.25 MG: 0.5 TABLET ORAL at 08:54

## 2018-01-01 RX ADMIN — WARFARIN SODIUM 4 MG: 4 TABLET ORAL at 17:21

## 2018-01-01 RX ADMIN — BUPROPION HYDROCHLORIDE 75 MG: 75 TABLET, FILM COATED ORAL at 20:12

## 2018-01-01 RX ADMIN — MORPHINE SULFATE 2 MG: 4 INJECTION INTRAVENOUS at 21:02

## 2018-01-01 RX ADMIN — LORAZEPAM 0.5 MG: 0.5 TABLET ORAL at 23:34

## 2018-01-01 RX ADMIN — LORAZEPAM 0.25 MG: 0.5 TABLET ORAL at 15:45

## 2018-01-01 RX ADMIN — LORAZEPAM 0.25 MG: 0.5 TABLET ORAL at 12:40

## 2018-01-01 RX ADMIN — WARFARIN SODIUM 5 MG: 5 TABLET ORAL at 17:16

## 2018-01-01 RX ADMIN — POTASSIUM CHLORIDE, DEXTROSE MONOHYDRATE AND SODIUM CHLORIDE 75 ML/HR: 150; 5; 900 INJECTION, SOLUTION INTRAVENOUS at 05:32

## 2018-01-01 RX ADMIN — GLYCOPYRROLATE 0.2 MG: 0.2 INJECTION, SOLUTION INTRAMUSCULAR; INTRAVENOUS at 04:35

## 2018-01-01 RX ADMIN — LORAZEPAM 1 MG: 2 INJECTION INTRAMUSCULAR; INTRAVENOUS at 14:52

## 2018-01-01 RX ADMIN — LORAZEPAM 0.25 MG: 0.5 TABLET ORAL at 17:20

## 2018-01-01 RX ADMIN — FUROSEMIDE 20 MG: 20 TABLET ORAL at 09:28

## 2018-01-01 RX ADMIN — LORAZEPAM 0.5 MG: 2 INJECTION INTRAMUSCULAR; INTRAVENOUS at 21:15

## 2018-01-01 RX ADMIN — BISACODYL 10 MG: 10 SUPPOSITORY RECTAL at 14:58

## 2018-01-01 RX ADMIN — BUPROPION HYDROCHLORIDE 75 MG: 75 TABLET, FILM COATED ORAL at 08:44

## 2018-01-01 RX ADMIN — Medication 3 MG: at 20:09

## 2018-01-01 RX ADMIN — CEFTRIAXONE SODIUM 1 G: 1 INJECTION, SOLUTION INTRAVENOUS at 20:36

## 2018-01-01 RX ADMIN — FLUOXETINE HYDROCHLORIDE 10 MG: 10 CAPSULE ORAL at 08:32

## 2018-01-01 RX ADMIN — FUROSEMIDE 20 MG: 20 TABLET ORAL at 12:40

## 2018-01-01 RX ADMIN — BUPROPION HYDROCHLORIDE 75 MG: 75 TABLET, FILM COATED ORAL at 14:10

## 2018-01-01 RX ADMIN — FLUOXETINE HYDROCHLORIDE 10 MG: 10 CAPSULE ORAL at 13:05

## 2018-01-01 RX ADMIN — LORAZEPAM 0.5 MG: 0.5 TABLET ORAL at 21:07

## 2018-01-01 RX ADMIN — BUPROPION HYDROCHLORIDE 75 MG: 75 TABLET, FILM COATED ORAL at 16:59

## 2018-01-01 RX ADMIN — LORAZEPAM 0.5 MG: 2 INJECTION INTRAMUSCULAR; INTRAVENOUS at 17:16

## 2018-01-01 RX ADMIN — BUPROPION HYDROCHLORIDE 75 MG: 75 TABLET, FILM COATED ORAL at 18:07

## 2018-01-01 RX ADMIN — ARIPIPRAZOLE 5 MG: 5 TABLET ORAL at 20:36

## 2018-01-01 RX ADMIN — FUROSEMIDE 20 MG: 20 TABLET ORAL at 11:10

## 2018-01-01 RX ADMIN — BUPROPION HYDROCHLORIDE 75 MG: 75 TABLET, FILM COATED ORAL at 09:43

## 2018-01-01 RX ADMIN — LORAZEPAM 0.25 MG: 0.5 TABLET ORAL at 16:00

## 2018-01-01 RX ADMIN — LORAZEPAM 0.25 MG: 0.5 TABLET ORAL at 09:25

## 2018-01-01 RX ADMIN — Medication 3 MG: at 20:41

## 2018-01-01 RX ADMIN — FUROSEMIDE 20 MG: 20 TABLET ORAL at 08:56

## 2018-01-01 RX ADMIN — FLUOXETINE HYDROCHLORIDE 10 MG: 10 CAPSULE ORAL at 09:26

## 2018-01-01 RX ADMIN — BUPROPION HYDROCHLORIDE 75 MG: 75 TABLET, FILM COATED ORAL at 09:01

## 2018-01-01 RX ADMIN — LORAZEPAM 0.25 MG: 0.5 TABLET ORAL at 11:52

## 2018-01-01 RX ADMIN — BUPROPION HYDROCHLORIDE 75 MG: 75 TABLET, FILM COATED ORAL at 09:53

## 2018-01-01 RX ADMIN — Medication 3 MG: at 23:09

## 2018-01-01 RX ADMIN — BUPROPION HYDROCHLORIDE 75 MG: 75 TABLET, FILM COATED ORAL at 17:21

## 2018-01-01 RX ADMIN — BUPROPION HYDROCHLORIDE 75 MG: 75 TABLET, FILM COATED ORAL at 21:25

## 2018-01-01 RX ADMIN — OLANZAPINE 15 MG: 7.5 TABLET, FILM COATED ORAL at 20:24

## 2018-01-01 RX ADMIN — OLANZAPINE 5 MG: 5 TABLET ORAL at 20:44

## 2018-01-01 RX ADMIN — OLANZAPINE 5 MG: 5 TABLET ORAL at 22:25

## 2018-01-01 RX ADMIN — POLYETHYLENE GLYCOL 3350 17 G: 17 POWDER, FOR SOLUTION ORAL at 08:22

## 2018-01-01 RX ADMIN — LORAZEPAM 0.25 MG: 0.5 TABLET ORAL at 08:59

## 2018-01-01 RX ADMIN — BUPROPION HYDROCHLORIDE 75 MG: 75 TABLET, FILM COATED ORAL at 09:28

## 2018-01-01 RX ADMIN — LORAZEPAM 0.5 MG: 2 INJECTION INTRAMUSCULAR; INTRAVENOUS at 05:42

## 2018-01-01 RX ADMIN — LORAZEPAM 0.5 MG: 0.5 TABLET ORAL at 21:57

## 2018-01-01 RX ADMIN — LORAZEPAM 0.5 MG: 2 INJECTION INTRAMUSCULAR; INTRAVENOUS at 01:26

## 2018-01-01 RX ADMIN — OLANZAPINE 5 MG: 5 TABLET ORAL at 22:09

## 2018-01-01 RX ADMIN — OLANZAPINE 5 MG: 5 TABLET ORAL at 20:34

## 2018-01-01 RX ADMIN — POLYETHYLENE GLYCOL 3350 17 G: 17 POWDER, FOR SOLUTION ORAL at 11:16

## 2018-01-01 RX ADMIN — LORAZEPAM 0.5 MG: 2 INJECTION INTRAMUSCULAR; INTRAVENOUS at 02:01

## 2018-01-01 RX ADMIN — Medication 1 TABLET: at 08:56

## 2018-01-01 RX ADMIN — LORAZEPAM 0.25 MG: 0.5 TABLET ORAL at 13:04

## 2018-01-01 RX ADMIN — BUPROPION HYDROCHLORIDE 75 MG: 75 TABLET, FILM COATED ORAL at 22:27

## 2018-01-01 RX ADMIN — VANCOMYCIN HYDROCHLORIDE 1500 MG: 10 INJECTION, POWDER, LYOPHILIZED, FOR SOLUTION INTRAVENOUS at 10:17

## 2018-01-01 RX ADMIN — Medication 1300 MG: at 09:46

## 2018-01-01 RX ADMIN — WARFARIN SODIUM 4 MG: 4 TABLET ORAL at 17:34

## 2018-01-01 RX ADMIN — Medication 3 MG: at 20:04

## 2018-01-01 RX ADMIN — MORPHINE SULFATE 2 MG: 4 INJECTION INTRAVENOUS at 00:40

## 2018-01-01 RX ADMIN — LORAZEPAM 0.5 MG: 0.5 TABLET ORAL at 20:25

## 2018-01-01 RX ADMIN — LORAZEPAM 0.5 MG: 0.5 TABLET ORAL at 20:04

## 2018-01-01 RX ADMIN — OLANZAPINE 7.5 MG: 5 TABLET ORAL at 22:05

## 2018-01-01 RX ADMIN — LORAZEPAM 0.25 MG: 0.5 TABLET ORAL at 16:51

## 2018-01-01 RX ADMIN — LORAZEPAM 0.25 MG: 0.5 TABLET ORAL at 09:58

## 2018-01-01 RX ADMIN — WARFARIN SODIUM 5 MG: 5 TABLET ORAL at 23:46

## 2018-01-01 RX ADMIN — DOCUSATE SODIUM -SENNOSIDES 1 TABLET: 50; 8.6 TABLET, COATED ORAL at 20:39

## 2018-01-01 RX ADMIN — OLANZAPINE 7.5 MG: 5 TABLET ORAL at 20:28

## 2018-01-01 RX ADMIN — BUPROPION HYDROCHLORIDE 75 MG: 75 TABLET, FILM COATED ORAL at 10:12

## 2018-01-01 RX ADMIN — FUROSEMIDE 20 MG: 20 TABLET ORAL at 08:15

## 2018-01-01 RX ADMIN — MORPHINE SULFATE 2 MG: 4 INJECTION INTRAVENOUS at 04:35

## 2018-01-01 RX ADMIN — ARIPIPRAZOLE 5 MG: 5 TABLET ORAL at 08:56

## 2018-01-01 RX ADMIN — BUPROPION HYDROCHLORIDE 75 MG: 75 TABLET, FILM COATED ORAL at 20:13

## 2018-01-01 RX ADMIN — BUPROPION HYDROCHLORIDE 75 MG: 75 TABLET, FILM COATED ORAL at 15:32

## 2018-01-01 RX ADMIN — FLUOXETINE HYDROCHLORIDE 10 MG: 10 CAPSULE ORAL at 09:59

## 2018-01-01 RX ADMIN — POTASSIUM CHLORIDE, DEXTROSE MONOHYDRATE AND SODIUM CHLORIDE 75 ML/HR: 150; 5; 900 INJECTION, SOLUTION INTRAVENOUS at 14:16

## 2018-01-01 RX ADMIN — SALINE NASAL SPRAY 2 SPRAY: 1.5 SOLUTION NASAL at 21:09

## 2018-01-01 RX ADMIN — BUPROPION HYDROCHLORIDE 75 MG: 75 TABLET, FILM COATED ORAL at 08:55

## 2018-01-01 RX ADMIN — WARFARIN SODIUM 4 MG: 4 TABLET ORAL at 18:07

## 2018-01-01 RX ADMIN — BUPROPION HYDROCHLORIDE 75 MG: 75 TABLET, FILM COATED ORAL at 20:48

## 2018-01-01 RX ADMIN — LORAZEPAM 0.25 MG: 0.5 TABLET ORAL at 08:57

## 2018-01-01 RX ADMIN — BUPROPION HYDROCHLORIDE 75 MG: 75 TABLET, FILM COATED ORAL at 17:12

## 2018-01-01 RX ADMIN — FUROSEMIDE 20 MG: 20 TABLET ORAL at 09:26

## 2018-01-01 RX ADMIN — LORAZEPAM 0.5 MG: 0.5 TABLET ORAL at 20:16

## 2018-01-01 RX ADMIN — BUPROPION HYDROCHLORIDE 75 MG: 75 TABLET, FILM COATED ORAL at 20:06

## 2018-01-01 RX ADMIN — POTASSIUM CHLORIDE, DEXTROSE MONOHYDRATE AND SODIUM CHLORIDE 75 ML/HR: 75; 5; 450 INJECTION, SOLUTION INTRAVENOUS at 04:56

## 2018-01-01 RX ADMIN — FUROSEMIDE 20 MG: 20 TABLET ORAL at 09:46

## 2018-01-01 RX ADMIN — BUPROPION HYDROCHLORIDE 75 MG: 75 TABLET, FILM COATED ORAL at 15:58

## 2018-01-01 RX ADMIN — WARFARIN SODIUM 5 MG: 5 TABLET ORAL at 17:25

## 2018-01-01 RX ADMIN — Medication 3 MG: at 21:06

## 2018-01-01 RX ADMIN — LORAZEPAM 0.25 MG: 0.5 TABLET ORAL at 17:37

## 2018-01-01 RX ADMIN — MORPHINE SULFATE 2 MG: 4 INJECTION INTRAVENOUS at 15:52

## 2018-01-01 RX ADMIN — ACETAMINOPHEN 650 MG: 650 SUPPOSITORY RECTAL at 22:05

## 2018-01-01 RX ADMIN — Medication 3 MG: at 22:25

## 2018-01-01 RX ADMIN — BUPROPION HYDROCHLORIDE 75 MG: 75 TABLET, FILM COATED ORAL at 16:00

## 2018-01-01 RX ADMIN — LORAZEPAM 0.5 MG: 0.5 TABLET ORAL at 22:09

## 2018-01-01 RX ADMIN — VANCOMYCIN HYDROCHLORIDE 1250 MG: 10 INJECTION, POWDER, LYOPHILIZED, FOR SOLUTION INTRAVENOUS at 09:10

## 2018-01-01 RX ADMIN — LORAZEPAM 0.5 MG: 0.5 TABLET ORAL at 22:25

## 2018-01-01 RX ADMIN — FLUOXETINE HYDROCHLORIDE 10 MG: 10 CAPSULE ORAL at 08:44

## 2018-01-01 RX ADMIN — FUROSEMIDE 20 MG: 20 TABLET ORAL at 09:53

## 2018-01-01 RX ADMIN — FLUOXETINE HYDROCHLORIDE 10 MG: 10 CAPSULE ORAL at 08:15

## 2018-01-01 RX ADMIN — BUPROPION HYDROCHLORIDE 75 MG: 75 TABLET, FILM COATED ORAL at 15:07

## 2018-01-01 RX ADMIN — VANCOMYCIN HYDROCHLORIDE 1250 MG: 10 INJECTION, POWDER, LYOPHILIZED, FOR SOLUTION INTRAVENOUS at 09:41

## 2018-01-01 RX ADMIN — ARIPIPRAZOLE 5 MG: 5 TABLET ORAL at 15:36

## 2018-01-01 RX ADMIN — LORAZEPAM 0.25 MG: 0.5 TABLET ORAL at 20:25

## 2018-01-01 RX ADMIN — BUPROPION HYDROCHLORIDE 75 MG: 75 TABLET, FILM COATED ORAL at 20:40

## 2018-01-01 RX ADMIN — BUPROPION HYDROCHLORIDE 75 MG: 75 TABLET, FILM COATED ORAL at 20:04

## 2018-01-01 RX ADMIN — LORAZEPAM 0.5 MG: 0.5 TABLET ORAL at 20:13

## 2018-01-01 RX ADMIN — BUPROPION HYDROCHLORIDE 75 MG: 75 TABLET, FILM COATED ORAL at 20:36

## 2018-01-01 RX ADMIN — LORAZEPAM 0.5 MG: 0.5 TABLET ORAL at 20:05

## 2018-01-01 RX ADMIN — LORAZEPAM 0.25 MG: 0.5 TABLET ORAL at 08:26

## 2018-01-01 RX ADMIN — BUPROPION HYDROCHLORIDE 75 MG: 75 TABLET, FILM COATED ORAL at 21:09

## 2018-01-01 RX ADMIN — BUPROPION HYDROCHLORIDE 75 MG: 75 TABLET, FILM COATED ORAL at 11:10

## 2018-01-01 RX ADMIN — ACETAMINOPHEN 650 MG: 325 TABLET, FILM COATED ORAL at 20:42

## 2018-01-01 RX ADMIN — VANCOMYCIN HYDROCHLORIDE 1500 MG: 10 INJECTION, POWDER, LYOPHILIZED, FOR SOLUTION INTRAVENOUS at 08:56

## 2018-01-01 RX ADMIN — BISACODYL 10 MG: 10 SUPPOSITORY RECTAL at 09:10

## 2018-01-01 RX ADMIN — FUROSEMIDE 20 MG: 20 TABLET ORAL at 09:32

## 2018-01-01 RX ADMIN — LORAZEPAM 0.5 MG: 2 INJECTION INTRAMUSCULAR; INTRAVENOUS at 13:40

## 2018-01-01 RX ADMIN — BUPROPION HYDROCHLORIDE 75 MG: 75 TABLET, FILM COATED ORAL at 16:19

## 2018-01-01 RX ADMIN — LORAZEPAM 0.5 MG: 2 INJECTION INTRAMUSCULAR; INTRAVENOUS at 17:19

## 2018-01-01 RX ADMIN — VANCOMYCIN HYDROCHLORIDE 1000 MG: 1 INJECTION, SOLUTION INTRAVENOUS at 17:38

## 2018-01-01 RX ADMIN — Medication 1300 MG: at 09:01

## 2018-01-01 RX ADMIN — Medication 3 MG: at 20:43

## 2018-01-01 RX ADMIN — BUPROPION HYDROCHLORIDE 75 MG: 75 TABLET, FILM COATED ORAL at 22:06

## 2018-01-01 RX ADMIN — OLANZAPINE 5 MG: 5 TABLET ORAL at 20:12

## 2018-01-01 RX ADMIN — BUPROPION HYDROCHLORIDE 75 MG: 75 TABLET, FILM COATED ORAL at 08:22

## 2018-01-01 RX ADMIN — OLANZAPINE 7.5 MG: 7.5 TABLET, FILM COATED ORAL at 20:06

## 2018-01-01 RX ADMIN — WARFARIN SODIUM 5 MG: 5 TABLET ORAL at 17:21

## 2018-01-01 RX ADMIN — Medication 1300 MG: at 11:44

## 2018-01-01 RX ADMIN — FUROSEMIDE 20 MG: 20 TABLET ORAL at 09:44

## 2018-01-01 RX ADMIN — Medication 1300 MG: at 09:28

## 2018-01-01 RX ADMIN — VANCOMYCIN HYDROCHLORIDE 1250 MG: 10 INJECTION, POWDER, LYOPHILIZED, FOR SOLUTION INTRAVENOUS at 11:29

## 2018-01-01 RX ADMIN — FLUOXETINE HYDROCHLORIDE 10 MG: 10 CAPSULE ORAL at 09:43

## 2018-01-01 RX ADMIN — Medication 1300 MG: at 08:44

## 2018-01-01 RX ADMIN — BISACODYL 10 MG: 10 SUPPOSITORY RECTAL at 08:14

## 2018-01-01 RX ADMIN — WARFARIN SODIUM 4 MG: 4 TABLET ORAL at 17:36

## 2018-01-01 RX ADMIN — Medication 1300 MG: at 09:27

## 2018-01-01 RX ADMIN — WARFARIN SODIUM 4 MG: 4 TABLET ORAL at 17:12

## 2018-01-01 RX ADMIN — BUPROPION HYDROCHLORIDE 75 MG: 75 TABLET, FILM COATED ORAL at 09:24

## 2018-01-01 RX ADMIN — FUROSEMIDE 20 MG: 20 TABLET ORAL at 08:44

## 2018-01-01 RX ADMIN — POLYETHYLENE GLYCOL 3350 17 G: 17 POWDER, FOR SOLUTION ORAL at 15:36

## 2018-01-01 RX ADMIN — POTASSIUM CHLORIDE 40 MEQ: 750 CAPSULE, EXTENDED RELEASE ORAL at 14:10

## 2018-01-01 RX ADMIN — Medication 1 TABLET: at 17:21

## 2018-01-01 RX ADMIN — LORAZEPAM 0.5 MG: 0.5 TABLET ORAL at 20:12

## 2018-01-01 RX ADMIN — BUPROPION HYDROCHLORIDE 75 MG: 75 TABLET, FILM COATED ORAL at 21:57

## 2018-01-01 RX ADMIN — Medication 3 MG: at 21:08

## 2018-01-01 RX ADMIN — LORAZEPAM 0.5 MG: 2 INJECTION INTRAMUSCULAR; INTRAVENOUS at 09:36

## 2018-01-01 RX ADMIN — BUPROPION HYDROCHLORIDE 75 MG: 75 TABLET, FILM COATED ORAL at 11:43

## 2018-01-01 RX ADMIN — BUPROPION HYDROCHLORIDE 75 MG: 75 TABLET, FILM COATED ORAL at 20:09

## 2018-01-01 RX ADMIN — BUPROPION HYDROCHLORIDE 75 MG: 75 TABLET, FILM COATED ORAL at 17:37

## 2018-01-01 RX ADMIN — WARFARIN SODIUM 5 MG: 5 TABLET ORAL at 20:24

## 2018-01-01 RX ADMIN — Medication 1300 MG: at 08:55

## 2018-01-01 RX ADMIN — OLANZAPINE 5 MG: 5 TABLET ORAL at 20:16

## 2018-01-01 RX ADMIN — BISACODYL 10 MG: 10 SUPPOSITORY RECTAL at 12:44

## 2018-01-01 RX ADMIN — OLANZAPINE 5 MG: 5 TABLET, ORALLY DISINTEGRATING ORAL at 13:19

## 2018-01-01 RX ADMIN — LORAZEPAM 0.5 MG: 0.5 TABLET ORAL at 20:45

## 2018-01-01 RX ADMIN — BUPROPION HYDROCHLORIDE 75 MG: 75 TABLET, FILM COATED ORAL at 09:26

## 2018-01-01 RX ADMIN — LORAZEPAM 0.5 MG: 0.5 TABLET ORAL at 21:08

## 2018-01-01 RX ADMIN — Medication 3 MG: at 20:28

## 2018-01-01 RX ADMIN — LORAZEPAM 0.25 MG: 0.5 TABLET ORAL at 16:35

## 2018-01-01 RX ADMIN — ACETAMINOPHEN 650 MG: 325 TABLET, FILM COATED ORAL at 05:52

## 2018-01-01 RX ADMIN — OLANZAPINE 7.5 MG: 5 TABLET ORAL at 21:07

## 2018-01-01 RX ADMIN — Medication 1 TABLET: at 10:12

## 2018-01-01 RX ADMIN — LORAZEPAM 0.5 MG: 2 INJECTION INTRAMUSCULAR; INTRAVENOUS at 15:01

## 2018-01-01 RX ADMIN — ARIPIPRAZOLE 5 MG: 5 TABLET ORAL at 15:32

## 2018-01-01 RX ADMIN — OLANZAPINE 5 MG: 5 TABLET ORAL at 20:09

## 2018-01-01 RX ADMIN — ARIPIPRAZOLE 5 MG: 5 TABLET ORAL at 21:25

## 2018-01-01 RX ADMIN — BUPROPION HYDROCHLORIDE 75 MG: 75 TABLET, FILM COATED ORAL at 20:16

## 2018-01-01 RX ADMIN — VANCOMYCIN HYDROCHLORIDE 1000 MG: 1 INJECTION, SOLUTION INTRAVENOUS at 14:56

## 2018-01-01 RX ADMIN — LORAZEPAM 0.5 MG: 0.5 TABLET ORAL at 22:07

## 2018-01-01 RX ADMIN — BUPROPION HYDROCHLORIDE 75 MG: 75 TABLET, FILM COATED ORAL at 16:51

## 2018-01-01 RX ADMIN — Medication 3 MG: at 20:34

## 2018-01-01 RX ADMIN — Medication 1 TABLET: at 15:36

## 2018-01-01 RX ADMIN — FLUOXETINE HYDROCHLORIDE 10 MG: 10 CAPSULE ORAL at 09:53

## 2018-01-01 RX ADMIN — BUPROPION HYDROCHLORIDE 75 MG: 75 TABLET, FILM COATED ORAL at 16:09

## 2018-01-01 RX ADMIN — LORAZEPAM 0.5 MG: 2 INJECTION INTRAMUSCULAR; INTRAVENOUS at 23:53

## 2018-01-01 RX ADMIN — Medication 1300 MG: at 09:32

## 2018-01-01 RX ADMIN — DEXTROSE MONOHYDRATE 75 ML/HR: 50 INJECTION, SOLUTION INTRAVENOUS at 11:01

## 2018-01-01 RX ADMIN — LORAZEPAM 0.25 MG: 0.5 TABLET ORAL at 17:21

## 2018-01-01 RX ADMIN — BISACODYL 10 MG: 10 SUPPOSITORY RECTAL at 08:22

## 2018-01-01 RX ADMIN — OLANZAPINE 7.5 MG: 10 INJECTION, POWDER, FOR SOLUTION INTRAMUSCULAR at 13:59

## 2018-01-01 RX ADMIN — OLANZAPINE 5 MG: 5 TABLET, ORALLY DISINTEGRATING ORAL at 21:56

## 2018-01-01 RX ADMIN — FLUOXETINE HYDROCHLORIDE 10 MG: 10 CAPSULE ORAL at 09:00

## 2018-01-01 RX ADMIN — BUPROPION HYDROCHLORIDE 75 MG: 75 TABLET, FILM COATED ORAL at 15:45

## 2018-01-01 RX ADMIN — FLUOXETINE HYDROCHLORIDE 10 MG: 10 CAPSULE ORAL at 11:43

## 2018-01-01 RX ADMIN — LORAZEPAM 0.25 MG: 0.5 TABLET ORAL at 09:45

## 2018-01-01 RX ADMIN — WARFARIN SODIUM 4 MG: 4 TABLET ORAL at 17:26

## 2018-01-01 RX ADMIN — FUROSEMIDE 20 MG: 20 TABLET ORAL at 09:24

## 2018-01-01 RX ADMIN — LORAZEPAM 0.25 MG: 0.5 TABLET ORAL at 08:45

## 2018-01-01 RX ADMIN — POLYETHYLENE GLYCOL 3350 17 G: 17 POWDER, FOR SOLUTION ORAL at 14:10

## 2018-01-01 RX ADMIN — BUPROPION HYDROCHLORIDE 75 MG: 75 TABLET, FILM COATED ORAL at 20:28

## 2018-01-01 RX ADMIN — LORAZEPAM 0.25 MG: 0.5 TABLET ORAL at 16:52

## 2018-01-01 RX ADMIN — BUPROPION HYDROCHLORIDE 75 MG: 75 TABLET, FILM COATED ORAL at 20:39

## 2018-01-01 RX ADMIN — OLANZAPINE 10 MG: 10 TABLET, FILM COATED ORAL at 23:34

## 2018-01-01 RX ADMIN — WARFARIN SODIUM 4 MG: 4 TABLET ORAL at 17:03

## 2018-01-01 RX ADMIN — DOCUSATE SODIUM -SENNOSIDES 2 TABLET: 50; 8.6 TABLET, COATED ORAL at 20:48

## 2018-01-01 RX ADMIN — WARFARIN SODIUM 4 MG: 4 TABLET ORAL at 17:08

## 2018-01-01 RX ADMIN — FUROSEMIDE 20 MG: 20 TABLET ORAL at 09:00

## 2018-01-01 RX ADMIN — Medication 1300 MG: at 09:29

## 2018-01-01 RX ADMIN — POTASSIUM CHLORIDE, DEXTROSE MONOHYDRATE AND SODIUM CHLORIDE 75 ML/HR: 150; 5; 900 INJECTION, SOLUTION INTRAVENOUS at 02:48

## 2018-01-01 RX ADMIN — OLANZAPINE 5 MG: 5 TABLET ORAL at 20:40

## 2018-01-01 RX ADMIN — LORAZEPAM 0.5 MG: 0.5 TABLET ORAL at 20:41

## 2018-01-01 RX ADMIN — WARFARIN SODIUM 4 MG: 4 TABLET ORAL at 17:20

## 2018-01-01 RX ADMIN — BUPROPION HYDROCHLORIDE 75 MG: 75 TABLET, FILM COATED ORAL at 17:03

## 2018-01-01 RX ADMIN — BUPROPION HYDROCHLORIDE 75 MG: 75 TABLET, FILM COATED ORAL at 20:27

## 2018-01-01 RX ADMIN — WARFARIN SODIUM 4 MG: 4 TABLET ORAL at 17:22

## 2018-01-01 RX ADMIN — LORAZEPAM 0.25 MG: 0.5 TABLET ORAL at 09:35

## 2018-01-01 RX ADMIN — FUROSEMIDE 20 MG: 20 TABLET ORAL at 08:55

## 2018-01-01 RX ADMIN — Medication 3 MG: at 22:09

## 2018-01-01 RX ADMIN — ONDANSETRON 4 MG: 2 INJECTION INTRAMUSCULAR; INTRAVENOUS at 10:05

## 2018-01-01 RX ADMIN — Medication 1300 MG: at 09:00

## 2018-01-01 RX ADMIN — BUPROPION HYDROCHLORIDE 75 MG: 75 TABLET, FILM COATED ORAL at 08:32

## 2018-01-01 RX ADMIN — BUPROPION HYDROCHLORIDE 75 MG: 75 TABLET, FILM COATED ORAL at 16:35

## 2018-01-01 RX ADMIN — OLANZAPINE 10 MG: 10 INJECTION, POWDER, FOR SOLUTION INTRAMUSCULAR at 23:26

## 2018-01-01 RX ADMIN — OLANZAPINE 10 MG: 10 INJECTION, POWDER, FOR SOLUTION INTRAMUSCULAR at 22:10

## 2018-01-01 RX ADMIN — BUPROPION HYDROCHLORIDE 75 MG: 75 TABLET, FILM COATED ORAL at 13:05

## 2018-01-01 RX ADMIN — SCOPALAMINE 1 PATCH: 1 PATCH, EXTENDED RELEASE TRANSDERMAL at 04:36

## 2018-01-01 RX ADMIN — LORAZEPAM 0.25 MG: 2 INJECTION INTRAMUSCULAR; INTRAVENOUS at 13:09

## 2018-01-01 RX ADMIN — ARIPIPRAZOLE 5 MG: 5 TABLET ORAL at 14:10

## 2018-01-01 RX ADMIN — LORAZEPAM 0.5 MG: 0.5 TABLET ORAL at 20:09

## 2018-01-01 RX ADMIN — FUROSEMIDE 20 MG: 20 TABLET ORAL at 09:58

## 2018-01-01 RX ADMIN — POTASSIUM CHLORIDE, DEXTROSE MONOHYDRATE AND SODIUM CHLORIDE 75 ML/HR: 150; 5; 900 INJECTION, SOLUTION INTRAVENOUS at 23:48

## 2018-01-01 RX ADMIN — DEXTROSE MONOHYDRATE 75 ML/HR: 50 INJECTION, SOLUTION INTRAVENOUS at 23:00

## 2018-01-01 RX ADMIN — BUPROPION HYDROCHLORIDE 75 MG: 75 TABLET, FILM COATED ORAL at 09:32

## 2018-01-01 RX ADMIN — BUPROPION HYDROCHLORIDE 75 MG: 75 TABLET, FILM COATED ORAL at 20:34

## 2018-01-01 RX ADMIN — BUPROPION HYDROCHLORIDE 75 MG: 75 TABLET, FILM COATED ORAL at 09:45

## 2018-01-01 RX ADMIN — LORAZEPAM 0.5 MG: 0.5 TABLET ORAL at 21:25

## 2018-01-01 RX ADMIN — LORAZEPAM 0.25 MG: 0.5 TABLET ORAL at 18:05

## 2018-01-01 RX ADMIN — BUPROPION HYDROCHLORIDE 75 MG: 75 TABLET, FILM COATED ORAL at 09:59

## 2018-01-01 RX ADMIN — POTASSIUM CHLORIDE, DEXTROSE MONOHYDRATE AND SODIUM CHLORIDE 75 ML/HR: 150; 5; 900 INJECTION, SOLUTION INTRAVENOUS at 15:02

## 2018-01-01 RX ADMIN — Medication 1300 MG: at 08:57

## 2018-01-01 RX ADMIN — OLANZAPINE 7.5 MG: 10 INJECTION, POWDER, FOR SOLUTION INTRAMUSCULAR at 20:37

## 2018-08-13 PROBLEM — M47.819 ARTHRITIS OF SPINE: Status: ACTIVE | Noted: 2018-01-01

## 2018-08-13 PROBLEM — F32.A DEPRESSION: Status: ACTIVE | Noted: 2018-01-01

## 2018-08-13 PROBLEM — F03.918 DEMENTIA WITH BEHAVIORAL DISTURBANCE (HCC): Status: ACTIVE | Noted: 2018-01-01

## 2018-08-28 NOTE — PROGRESS NOTES
"Pt seen and neuropsychological consultation attempted.  Pt awake and sitting in chair, but unable/unwilling to answer the evaluator's questions. Pt repeatedly made vague hand gestures and uttered \"G.O.D.\"  Pt was explained nature of the interaction but she continued to respond in the same manner. Consequently, meaningful examination of her cognitive functioning was not possible.  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  Clearly, continued 24-hour supervision and assistance will be necessary. Please reinitiate consult if pt's functional status appears to have improved.       "

## 2018-09-01 NOTE — PLAN OF CARE
"Problem: Patient Care Overview  Goal: Plan of Care Review  Outcome: Ongoing (interventions implemented as appropriate)   08/31/18 2330 09/01/18 0541   Plan of Care Review   Progress --  no change   OTHER   Outcome Summary --  Pt has been confused and irritable this shift. Pt has been extremely restless, even after ADL needs have been met. Pt continues to be religiously preoccupied, and was at times yelling out \"G-O-D, dog backwards is god\". Pt became combative at one point during a brief change. IM zyprexa given per MAR. Pt continued to get up and was a harm to herself. Dr. Decker called and order for [place] vest received. Pt remained restless for hours, however eventually fell asleep around approximately 5 am. Oriented to self only. Gait is unsteady. Pt was compliant with her medications. Will continue to monitor.    Coping/Psychosocial   Plan of Care Reviewed With patient --    Coping/Psychosocial   Patient Agreement with Plan of Care unable to participate --        Problem: Overarching Goals (Adult)  Goal: Adheres to Safety Considerations for Self and Others  Outcome: Ongoing (interventions implemented as appropriate)   09/01/18 0541   Overarching Goals (Adult)   Adheres to Safety Considerations for Self and Others making progress toward outcome     Intervention: Develop and Maintain Individualized Safety Plan   08/31/18 2330 09/01/18 0513   Violence Risk   Feels Like Hurting Others no --    Previous Attempt to Harm Others no --    C-SSRS (Recent)   Wish to be Dead no --    Suicidal Thoughts no --    Suicide Behavior no --    Develop and Maintain Individualized Safety Plan   Safety Measures --  safety rounds completed       Goal: Optimized Coping Skills in Response to Life Stressors  Outcome: Ongoing (interventions implemented as appropriate)   09/01/18 0541   Overarching Goals (Adult)   Optimized Coping Skills in Response to Life Stressors making progress toward outcome     Intervention: Promote Effective Coping " Strategies   08/31/18 2330   Coping/Psychosocial Interventions   Supportive Measures active listening utilized;verbalization of feelings encouraged       Goal: Develops/Participates in Therapeutic [place] to Support Successful Transition  Outcome: Ongoing (interventions implemented as appropriate)   09/01/18 0541   Overarching Goals (Adult)   Develops/Participates in Therapeutic [place] to Support Successful Transition making progress toward outcome     Intervention: Foster Therapeutic [place]   08/31/18 2330   Interventions   Trust Relationship/Rapport care explained;thoughts/feelings acknowledged     Intervention: Mutually Develop Transition Plan   08/31/18 2330   Mutually Develop Transition Plan   Transition Support crisis management plan promoted         Problem: Cognitive Impairment (Psychotic Signs/Symptoms) (Adult)  Goal: Improved Thought Clarity/Organization (Psychotic Signs/Symptoms)  Outcome: Ongoing (interventions implemented as appropriate)   09/01/18 0541   Improved Thought Clarity/Organization (Psychotic Signs/Symptoms)   Improved Thought Clarity/Organization Time Frame for Action Step (STG) 1 day   Improved Thought Clarity/Organization Action Step (STG) Outcome making progress toward outcome       Problem: Psychomotor Movement Impairment (Psychotic Signs/Symptoms) (Adult)  Goal: Improved Psychomotor Symptoms (Psychotic Signs/Symptoms)  Outcome: Ongoing (interventions implemented as appropriate)   09/01/18 0541   Improved Psychomotor Symptoms (Psychotic Signs/Symptoms)   Improved Psychomotor Symptoms Time Frame for Action Step (STG) 1 day   Improved Psychomotor Symptoms Action Step (STG) Outcome making progress toward outcome       Problem: Mental State/Mood Impairment (Psychotic Signs/Symptoms) (Adult)  Goal: Improved Mental State/Mood (Psychotic Signs/Symptoms)  Outcome: Ongoing (interventions implemented as appropriate)   09/01/18 0541   Improved Mental State/Mood (Psychotic Signs/Symptoms)    Improved Mental State/Mood Time Frame for Action Step (STG) 1 day   Improved Mental State/Mood Action Step (STG) Outcome making progress toward outcome       Problem: Self-expression Impairment (Psychotic Signs/Symptoms) (Adult)  Goal: Improved Self-Expression (Psychotic Signs/Symptoms)  Outcome: Ongoing (interventions implemented as appropriate)   09/01/18 0541   Improved Self-Expression (Psychotic Signs/Symptoms)   Improved Self-Expression Time Frame for Action Step (STG) 1 day   Improved Self-Expression Action Step (STG) Outcome making progress toward outcome       Problem: Sleep Impairment (Psychotic Signs/Symptoms) (Adult)  Goal: Improved Sleep Hygiene (Psychotic Signs/Symptoms)  Outcome: Ongoing (interventions implemented as appropriate)   09/01/18 0541   Improved Sleep Hygiene (Psychotic Signs/Symptoms)   Improved Sleep Hygiene Time Frame for Action Step (STG) 1 day   Improved Sleep Hygiene Action Step (STG) Outcome making progress toward outcome       Problem: Fall Risk (Adult)  Goal: Absence of Fall  Outcome: Ongoing (interventions implemented as appropriate)   09/01/18 0541   Fall Risk (Adult)   Absence of Fall making progress toward outcome       Problem: Restraint, Nonbehavioral (Nonviolent)  Goal: Rationale and Justification  Outcome: Ongoing (interventions implemented as appropriate)   09/01/18 0541   Restraint, Nonbehavioral (Nonviolent)   Rationale and Justification prevent harm to self      09/01/18 0541   Restraint, Nonbehavioral (Nonviolent)   Rationale and Justification prevent harm to self;failure of less restrictive safety measures     Goal: Nonbehavioral (Nonviolent) Restraint: Absence of Injury/Harm  Outcome: Ongoing (interventions implemented as appropriate)   09/01/18 0541   Restraint, Nonbehavioral (Nonviolent)   Nonbehavioral (Nonviolent) Restraint: Absence of Injury/Harm met     Goal: Nonbehavioral (Nonviolent) Restraint: Achievement of Discontinuation Criteria  Outcome: Ongoing  (interventions implemented as appropriate)   09/01/18 0541   Restraint, Nonbehavioral (Nonviolent)   Nonbehavioral (Nonviolent) Restraint: Achievement of Discontinuation Criteria not met     Goal: Nonbehavioral (Nonviolent) Restraint: Preservation of Dignity and Wellbeing  Outcome: Ongoing (interventions implemented as appropriate)   09/01/18 0541   Restraint, Nonbehavioral (Nonviolent)   Nonbehavioral (Nonviolent) Restraint: Preservation of Dignity and Wellbeing met       Problem: Skin Injury Risk (Adult)  Goal: Skin Health and Integrity  Outcome: Ongoing (interventions implemented as appropriate)   09/01/18 0541   Skin Injury Risk (Adult)   Skin Health and Integrity making progress toward outcome

## 2018-09-01 NOTE — PLAN OF CARE
"Problem: Patient Care Overview  Goal: Plan of Care Review  Outcome: Ongoing (interventions implemented as appropriate)   09/01/18 0541 09/01/18 1025 09/01/18 1551   Plan of Care Review   Progress no change --  --    OTHER   Outcome Summary --  --  The patient is only oriented to self. She has been religously preoccupied only answering \"god\" Often times the pagtient would not answr anything. She has had very little oral intake. Attempted to get patient to drink ensure shake but she refused. The patient has been drowsy throughout the day. Posey vest restraint discontinued at 1328 and the patient has tolerated well so far. Cooperative with medications with encouragement. Continuing to monitor.    Coping/Psychosocial   Plan of Care Reviewed With --  --  patient   Coping/Psychosocial   Patient Agreement with Plan of Care --  unable to participate --      Goal: Individualization and Mutuality  Outcome: Ongoing (interventions implemented as appropriate)    Goal: Discharge Needs Assessment  Outcome: Ongoing (interventions implemented as appropriate)    Goal: Interprofessional Rounds/Family Conf  Outcome: Ongoing (interventions implemented as appropriate)      Problem: Overarching Goals (Adult)  Goal: Adheres to Safety Considerations for Self and Others  Outcome: Ongoing (interventions implemented as appropriate)   09/01/18 1551   Overarching Goals (Adult)   Adheres to Safety Considerations for Self and Others making progress toward outcome     Goal: Optimized Coping Skills in Response to Life Stressors  Outcome: Ongoing (interventions implemented as appropriate)   09/01/18 1551   Overarching Goals (Adult)   Optimized Coping Skills in Response to Life Stressors making progress toward outcome     Intervention: Promote Effective Coping Strategies   09/01/18 1025   Coping/Psychosocial Interventions   Supportive Measures active listening utilized;relaxation techniques promoted;self-care encouraged;self-reflection " promoted;self-responsibility promoted;verbalization of feelings encouraged       Goal: Develops/Participates in Therapeutic [place] to Support Successful Transition  Outcome: Ongoing (interventions implemented as appropriate)   09/01/18 1551   Overarching Goals (Adult)   Develops/Participates in Therapeutic [place] to Support Successful Transition making progress toward outcome     Intervention: Foster Therapeutic [place]   09/01/18 1025   Interventions   Trust Relationship/Rapport care explained;choices provided;emotional support provided;empathic listening provided;questions answered;questions encouraged;reassurance provided;thoughts/feelings acknowledged         Problem: Cognitive Impairment (Psychotic Signs/Symptoms) (Adult)  Goal: Improved Thought Clarity/Organization (Psychotic Signs/Symptoms)  Outcome: Ongoing (interventions implemented as appropriate)   09/01/18 1551   Improved Thought Clarity/Organization (Psychotic Signs/Symptoms)   Improved Thought Clarity/Organization Time Frame for Action Step (STG) 1 day   Improved Thought Clarity/Organization Action Step (STG) Outcome making progress toward outcome       Problem: Psychomotor Movement Impairment (Psychotic Signs/Symptoms) (Adult)  Goal: Improved Psychomotor Symptoms (Psychotic Signs/Symptoms)  Outcome: Ongoing (interventions implemented as appropriate)   09/01/18 1551   Improved Psychomotor Symptoms (Psychotic Signs/Symptoms)   Improved Psychomotor Symptoms Time Frame for Action Step (STG) 1 day   Improved Psychomotor Symptoms Action Step (STG) Outcome making progress toward outcome       Problem: Mental State/Mood Impairment (Psychotic Signs/Symptoms) (Adult)  Goal: Improved Mental State/Mood (Psychotic Signs/Symptoms)  Outcome: Ongoing (interventions implemented as appropriate)   09/01/18 1551   Improved Mental State/Mood (Psychotic Signs/Symptoms)   Improved Mental State/Mood Time Frame for Action Step (STG) 1 day   Improved Mental State/Mood  Action Step (STG) Outcome making progress toward outcome       Problem: Self-expression Impairment (Psychotic Signs/Symptoms) (Adult)  Goal: Improved Self-Expression (Psychotic Signs/Symptoms)  Outcome: Ongoing (interventions implemented as appropriate)   09/01/18 1551   Improved Self-Expression (Psychotic Signs/Symptoms)   Improved Self-Expression Time Frame for Action Step (STG) 1 day   Improved Self-Expression Action Step (STG) Outcome making progress toward outcome       Problem: Sleep Impairment (Psychotic Signs/Symptoms) (Adult)  Goal: Improved Sleep Hygiene (Psychotic Signs/Symptoms)  Outcome: Ongoing (interventions implemented as appropriate)   09/01/18 1551   Improved Sleep Hygiene (Psychotic Signs/Symptoms)   Improved Sleep Hygiene Time Frame for Action Step (STG) 1 day   Improved Sleep Hygiene Action Step (STG) Outcome making progress toward outcome       Problem: Fall Risk (Adult)  Goal: Absence of Fall  Outcome: Ongoing (interventions implemented as appropriate)   09/01/18 1551   Fall Risk (Adult)   Absence of Fall making progress toward outcome       Problem: Restraint, Nonbehavioral (Nonviolent)  Goal: Rationale and Justification  Outcome: Outcome(s) achieved Date Met: 09/01/18 09/01/18 1551   Restraint, Nonbehavioral (Nonviolent)   Rationale and Justification prevent harm to self;failure of less restrictive safety measures     Goal: Nonbehavioral (Nonviolent) Restraint: Absence of Injury/Harm  Outcome: Outcome(s) achieved Date Met: 09/01/18 09/01/18 1551   Restraint, Nonbehavioral (Nonviolent)   Nonbehavioral (Nonviolent) Restraint: Absence of Injury/Harm met     Goal: Nonbehavioral (Nonviolent) Restraint: Achievement of Discontinuation Criteria  Outcome: Outcome(s) achieved Date Met: 09/01/18 09/01/18 1551   Restraint, Nonbehavioral (Nonviolent)   Nonbehavioral (Nonviolent) Restraint: Achievement of Discontinuation Criteria met     Goal: Nonbehavioral (Nonviolent) Restraint: Preservation of  Dignity and Wellbeing  Outcome: Outcome(s) achieved Date Met: 09/01/18      Problem: Skin Injury Risk (Adult)  Goal: Skin Health and Integrity  Outcome: Ongoing (interventions implemented as appropriate)   09/01/18 9708   Skin Injury Risk (Adult)   Skin Health and Integrity making progress toward outcome

## 2018-09-01 NOTE — PROGRESS NOTES
"Patient is seen, evaluated, and chart reviewed. Discussed with staff.  Patient is seen for Dr. Garcia.    Staff reports that patient has been compliant with medications.  She remains confused and oriented only to self.  She remains irritable and religiously preoccupied.  She became combative last evening and received Zyprexa IM however this was of little benefit. Patient slept very poorly last night.        On examination, patient is found sitting in the day room, [place] in place.  She is found holding one of her socks and repeating \"God.\"  She is otherwise very difficult to understand due to disorganized thought processes.    No medication side effects.  Patient is provided with supportive therapy.  Will increase Zyprexa 10 mg at bedtime.  Continue when necessary Zyprexa but consider possible alternative.  Continue current medications, therapy, and inpatient treatment plan for safety and stabilization.  Possible disposition to the [facility].  "

## 2018-09-02 NOTE — PLAN OF CARE
"Problem: Patient Care Overview  Goal: Plan of Care Review  Outcome: Ongoing (interventions implemented as appropriate)   09/02/18 0507   Plan of Care Review   Progress declining   OTHER   Outcome Summary Pt appears very flat and psychotic. When her attention is briefly gained, she has a fixed stare, points or gestures in a bizarre manner, and will say \"God\" , or spell the word. She is not able to participate in assessment. Fluids and oral intake encouraged, but she only took a couple of small sips water and two bites of chocolate ice cream. BP running high 177/103 around 2330, and 170/80 a couple of hours later. Pt does not respond when asked about her daughter, or when asked more specifically if she has a daughter named Verito (Weeks ago, she continuously talked about this daughter).    Coping/Psychosocial   Plan of Care Reviewed With patient   Coping/Psychosocial   Patient Agreement with Plan of Care unable to participate     Goal: Individualization and Mutuality  Outcome: Ongoing (interventions implemented as appropriate)   09/02/18 0507   Personal Strengths/Vulnerabilities   Patient Personal Strengths family/social support;medication/treatment adherence       Problem: Overarching Goals (Adult)  Goal: Adheres to Safety Considerations for Self and Others  Outcome: Ongoing (interventions implemented as appropriate)   09/02/18 0507   Overarching Goals (Adult)   Adheres to Safety Considerations for Self and Others making progress toward outcome     Goal: Optimized Coping Skills in Response to Life Stressors  Outcome: Ongoing (interventions implemented as appropriate)   09/02/18 0507   Overarching Goals (Adult)   Optimized Coping Skills in Response to Life Stressors making progress toward outcome     Goal: Develops/Participates in Therapeutic [place] to Support Successful Transition  Outcome: Ongoing (interventions implemented as appropriate)   09/02/18 0507   Overarching Goals (Adult)   Develops/Participates in " Therapeutic [place] to Support Successful Transition making progress toward outcome       Problem: Cognitive Impairment (Psychotic Signs/Symptoms) (Adult)  Goal: Improved Thought Clarity/Organization (Psychotic Signs/Symptoms)  Outcome: Ongoing (interventions implemented as appropriate)   09/02/18 0507   Improved Thought Clarity/Organization (Psychotic Signs/Symptoms)   Improved Thought Clarity/Organization Action Step/Short Term Goal (STG) Established 09/02/18   Improved Thought Clarity/Organization Time Frame for Action Step (STG) 4 days   Improved Thought Clarity/Organization Action Step (STG) Outcome making progress toward outcome       Problem: Psychomotor Movement Impairment (Psychotic Signs/Symptoms) (Adult)  Goal: Improved Psychomotor Symptoms (Psychotic Signs/Symptoms)  Outcome: Ongoing (interventions implemented as appropriate)   09/02/18 0507   Improved Psychomotor Symptoms (Psychotic Signs/Symptoms)   Improved Psychomotor Symptoms Action Step/Short Term Goal (STG) Established 09/02/18   Improved Psychomotor Symptoms Time Frame for Action Step (STG) 4 days   Improved Psychomotor Symptoms Action Step (STG) Outcome making progress toward outcome       Problem: Mental State/Mood Impairment (Psychotic Signs/Symptoms) (Adult)  Goal: Improved Mental State/Mood (Psychotic Signs/Symptoms)  Outcome: Ongoing (interventions implemented as appropriate)   09/02/18 0507   Improved Mental State/Mood (Psychotic Signs/Symptoms)   Improved Mental State/Mood Action Step/Short Term Goal (STG) Established 09/02/18   Improved Mental State/Mood Time Frame for Action Step (STG) 4 days   Improved Mental State/Mood Action Step (STG) Outcome making progress toward outcome       Problem: Self-expression Impairment (Psychotic Signs/Symptoms) (Adult)  Goal: Improved Self-Expression (Psychotic Signs/Symptoms)  Outcome: Ongoing (interventions implemented as appropriate)   09/02/18 0507   Improved Self-Expression (Psychotic  Signs/Symptoms)   Improved Self-Expression Action Step/Short Term Goal (STG) Established 09/02/18   Improved Self-Expression Time Frame for Action Step (STG) 4 days   Improved Self-Expression Action Step (STG) Outcome making progress toward outcome       Problem: Sleep Impairment (Psychotic Signs/Symptoms) (Adult)  Goal: Improved Sleep Hygiene (Psychotic Signs/Symptoms)  Outcome: Ongoing (interventions implemented as appropriate)   09/02/18 0507   Improved Sleep Hygiene (Psychotic Signs/Symptoms)   Improved Sleep Hygiene Action Step/Short Term Goal (STG) Established 09/02/18   Improved Sleep Hygiene Time Frame for Action Step (STG) 4 days   Improved Sleep Hygiene Action Step (STG) Outcome making progress toward outcome       Problem: Fall Risk (Adult)  Goal: Absence of Fall  Outcome: Ongoing (interventions implemented as appropriate)   09/02/18 0507   Fall Risk (Adult)   Absence of Fall making progress toward outcome       Problem: Skin Injury Risk (Adult)  Goal: Skin Health and Integrity  Outcome: Ongoing (interventions implemented as appropriate)   09/02/18 0507   Skin Injury Risk (Adult)   Skin Health and Integrity making progress toward outcome

## 2018-09-02 NOTE — PLAN OF CARE
"Problem: Patient Care Overview  Goal: Plan of Care Review   09/02/18 1507   OTHER   Outcome Summary The patient has been mute today except for stating \"God\" one or two times. The patient has been lethargic throughout the day and has required repeated stimulation to arrouse her. The patient was able to be arroused and she walked to her room with much asisstance. the pateint fell asleep immediatley when laying down. During brekfast the pateint was holding her cereal in her mouth and would not swallow it. Despite much encourgement to swallow the patient would not. The patient eventually spit out her food when patted on the back. The patient also helkd water in her mout and would not swallow. Medications held due to patient being lethargic and holding food/liquid in her mouth. Currently sleeping. Contiuing to monitor.          "

## 2018-09-02 NOTE — PROGRESS NOTES
Patient is seen, evaluated, and chart reviewed. Discussed with staff.  Patient is seen for Dr. Garcia.     Staff reports that patient has been mute.  She's had very little movement.  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     On examination, patient is found laying in bed.  I am unable to awaken her but she is in no acute distress.  She does display waxy flexibility.       No medication side effects.  Will change Zyprexa to Zydis 5 mg bid.  Continue current medications, therapy, and inpatient treatment plan for safety and stabilization.  Possible disposition to the [facility].

## 2018-09-02 NOTE — PLAN OF CARE
Problem: Patient Care Overview  Goal: Plan of Care Review  Outcome: Ongoing (interventions implemented as appropriate)    Goal: Individualization and Mutuality  Outcome: Ongoing (interventions implemented as appropriate)    Goal: Discharge Needs Assessment  Outcome: Ongoing (interventions implemented as appropriate)    Goal: Interprofessional Rounds/Family Conf  Outcome: Ongoing (interventions implemented as appropriate)      Problem: Overarching Goals (Adult)  Goal: Adheres to Safety Considerations for Self and Others  Outcome: Ongoing (interventions implemented as appropriate)   09/02/18 1507   Overarching Goals (Adult)   Adheres to Safety Considerations for Self and Others making progress toward outcome     Intervention: Develop and Maintain Individualized Safety Plan   09/02/18 0911   C-SSRS (Recent)   Wish to be Dead (patiwent did not answer)   Violence Risk   Feels Like Hurting Others (patient did not answer)   Develop and Maintain Individualized Safety Plan   Safety Measures safety rounds completed;suicide assessment completed       Goal: Optimized Coping Skills in Response to Life Stressors  Outcome: Ongoing (interventions implemented as appropriate)   09/02/18 1507   Overarching Goals (Adult)   Optimized Coping Skills in Response to Life Stressors making progress toward outcome     Intervention: Promote Effective Coping Strategies   09/02/18 0955   Coping/Psychosocial Interventions   Supportive Measures active listening utilized;relaxation techniques promoted;self-care encouraged;self-reflection promoted;self-responsibility promoted;verbalization of feelings encouraged       Goal: Develops/Participates in Therapeutic [place] to Support Successful Transition  Outcome: Ongoing (interventions implemented as appropriate)   09/02/18 1507   Overarching Goals (Adult)   Develops/Participates in Therapeutic [place] to Support Successful Transition making progress toward outcome     Intervention: Foster Therapeutic  [place]   09/02/18 0957   Interventions   Trust Relationship/Rapport care explained;choices provided;emotional support provided;empathic listening provided;questions answered;questions encouraged;reassurance provided;thoughts/feelings acknowledged         Problem: Cognitive Impairment (Psychotic Signs/Symptoms) (Adult)  Goal: Improved Thought Clarity/Organization (Psychotic Signs/Symptoms)  Outcome: Ongoing (interventions implemented as appropriate)   09/02/18 1507   Improved Thought Clarity/Organization (Psychotic Signs/Symptoms)   Improved Thought Clarity/Organization Time Frame for Action Step (STG) 4 days   Improved Thought Clarity/Organization Action Step (STG) Outcome making progress toward outcome       Problem: Psychomotor Movement Impairment (Psychotic Signs/Symptoms) (Adult)  Goal: Improved Psychomotor Symptoms (Psychotic Signs/Symptoms)  Outcome: Ongoing (interventions implemented as appropriate)   09/02/18 1507   Improved Psychomotor Symptoms (Psychotic Signs/Symptoms)   Improved Psychomotor Symptoms Time Frame for Action Step (STG) 4 days   Improved Psychomotor Symptoms Action Step (STG) Outcome making progress toward outcome       Problem: Mental State/Mood Impairment (Psychotic Signs/Symptoms) (Adult)  Goal: Improved Mental State/Mood (Psychotic Signs/Symptoms)  Outcome: Ongoing (interventions implemented as appropriate)   09/02/18 1507   Improved Mental State/Mood (Psychotic Signs/Symptoms)   Improved Mental State/Mood Time Frame for Action Step (STG) 4 days   Improved Mental State/Mood Action Step (STG) Outcome making progress toward outcome       Problem: Self-expression Impairment (Psychotic Signs/Symptoms) (Adult)  Goal: Improved Self-Expression (Psychotic Signs/Symptoms)  Outcome: Ongoing (interventions implemented as appropriate)   09/02/18 1507   Improved Self-Expression (Psychotic Signs/Symptoms)   Improved Self-Expression Time Frame for Action Step (STG) 4 days   Improved Self-Expression  Action Step (STG) Outcome making progress toward outcome       Problem: Sleep Impairment (Psychotic Signs/Symptoms) (Adult)  Goal: Improved Sleep Hygiene (Psychotic Signs/Symptoms)  Outcome: Ongoing (interventions implemented as appropriate)   09/02/18 1507   Improved Sleep Hygiene (Psychotic Signs/Symptoms)   Improved Sleep Hygiene Time Frame for Action Step (STG) 4 days   Improved Sleep Hygiene Action Step (STG) Outcome making progress toward outcome       Problem: Fall Risk (Adult)  Goal: Absence of Fall  Outcome: Ongoing (interventions implemented as appropriate)   09/02/18 1507   Fall Risk (Adult)   Absence of Fall making progress toward outcome       Problem: Skin Injury Risk (Adult)  Goal: Skin Health and Integrity  Outcome: Ongoing (interventions implemented as appropriate)   09/02/18 1507   Skin Injury Risk (Adult)   Skin Health and Integrity making progress toward outcome

## 2018-09-02 NOTE — PROGRESS NOTES
DAILY PROGRESS NOTE  [facility]    Patient Identification:  Name: Britany Pruitt  Age: 88 y.o.  Sex: female  :  1930  MRN: [id number]         Primary Care Physician: Jerome Moore Jr., MD      Subjective  Pt very somnolent at present.  Not answering questions.     Objective:  General Appearance:  Comfortable, well-appearing, in no acute distress and not in pain.    Vital signs: (most recent): Blood pressure 144/89, pulse 96, temperature 97.3 °F (36.3 °C), temperature source Oral, resp. rate 18, SpO2 96 %.    Lungs:  Normal effort and normal respiratory rate.  Breath sounds clear to auscultation.    Heart: Normal rate.  Regular rhythm.    Extremities: There is no dependent edema.    Neurological: (Somnolent. ).    Skin:  Warm and dry.                Vital signs in last 24 hours:  Temp:  [97.3 °F (36.3 °C)] 97.3 °F (36.3 °C)  Heart Rate:  [85-96] 96  Resp:  [15-18] 18  BP: (144-177)/() 144/89    Intake/Output:  No intake or output data in the 24 hours ending 18 1401          Estimated Creatinine Clearance: 41.8 mL/min (by C-G formula based on SCr of 0.59 mg/dL).        Assessment:  Principal Problem:    Dementia with behavioral disturbance  Active Problems:    History of DVT (deep vein thrombosis)    Chronic anticoagulation - INR therapeutic.     Depression    Arthritis of spine (CMS/HCC)        Plan:  Discussed w daughter and son in law at bedside.    Continue to monitor INR.     Delmer Toure MD  2018  2:01 PM

## 2018-09-03 NOTE — PROGRESS NOTES
Patient is seen, evaluated, and chart reviewed. Discussed with staff.  Patient is seen for Dr. Garcia.    Staff reports that patient appears to be oversedated.  Meds have been held for this reason.  Staff reports that patient previously had been on Zyprexa 10 mg but this caused increased drowsiness and was subsequently reduced.    On examination, patient is found sitting in the day room.  She is sleeping in no acute distress.  She does display waxy flexibility.      No medication side effects, other than drowsiness.  We'll change Ativan to as needed for anxiety and agitation.  I will discontinue Zyprexa Zydis due to drowsiness.  Family reports a history of catatonic-like behavior that responded to ECT.  This will be a consideration in the near future.  Will start Abilify 5 mg at bedtime.  Monitor for any ongoing drowsiness.  Continue current medications, therapy, and inpatient treatment plan for safety and stabilization.  Dr. Garcia will resume care tomorrow.

## 2018-09-03 NOTE — PLAN OF CARE
Problem: Patient Care Overview  Goal: Plan of Care Review  Outcome: Ongoing (interventions implemented as appropriate)   09/03/18 1634   Plan of Care Review   Progress declining   OTHER   Outcome Summary PT's conditioned as ranged from drowsiness to lethargy this shift. PT has not beed alert enought this shift to safety swallow meds nor food. All meds withheld this shift. Discussed PT status along with her medications with Dr. Decker. Intake has been poor. Spoke with PT's Son this afternoon. Vital signs are stable. Will continue to monitor behavior/condition and provide support.   Coping/Psychosocial   Plan of Care Reviewed With son   Coping/Psychosocial   Patient Agreement with Plan of Care unable to participate     Goal: Individualization and Mutuality  Outcome: Ongoing (interventions implemented as appropriate)    Goal: Discharge Needs Assessment  Outcome: Ongoing (interventions implemented as appropriate)    Goal: Interprofessional Rounds/Family Conf  Outcome: Ongoing (interventions implemented as appropriate)      Problem: Overarching Goals (Adult)  Goal: Adheres to Safety Considerations for Self and Others  Outcome: Ongoing (interventions implemented as appropriate)    Goal: Optimized Coping Skills in Response to Life Stressors  Outcome: Ongoing (interventions implemented as appropriate)    Goal: Develops/Participates in Therapeutic [place] to Support Successful Transition  Outcome: Ongoing (interventions implemented as appropriate)

## 2018-09-03 NOTE — PROGRESS NOTES
DAILY PROGRESS NOTE  [facility]    Patient Identification:  Name: Britany Pruitt  Age: 88 y.o.  Sex: female  :  1930  MRN: [id number]         Primary Care Physician: Jerome Moore Jr., MD    Subjective:  Interval History:Not talking today. Sedated    Objective:    Scheduled Meds:  acetaminophen 1,300 mg Oral Daily   ARIPiprazole 5 mg Oral Nightly   buPROPion 75 mg Oral TID   furosemide 20 mg Oral Daily   warfarin 4 mg Oral Daily     Continuous Infusions:     Vital signs in last 24 hours:  Temp:  [97.1 °F (36.2 °C)] 97.1 °F (36.2 °C)  Heart Rate:  [] 97  Resp:  [18] 18  BP: (124-136)/(72-93) 124/72    Intake/Output:    Intake/Output Summary (Last 24 hours) at 18 1504  Last data filed at 18 1802   Gross per 24 hour   Intake               25 ml   Output                0 ml   Net               25 ml       Exam:  /72 (BP Location: Left arm, Patient Position: Lying)   Pulse 97   Temp 97.1 °F (36.2 °C) (Oral)   Resp 18   SpO2 94%     General Appearance:    Not talking, no distress   Head:    Normocephalic, without obvious abnormality, atraumatic   Eyes:       Throat:   Lips, tongue, gums normal   Neck:   Supple, symmetrical, trachea midline, no JVD   Lungs:     Clear to auscultation bilaterally, respirations unlabored   Chest Wall:    No tenderness or deformity    Heart:    Regular rate and rhythm, S1 and S2 normal, no murmur,no  Rub or gallop   Abdomen:     Soft, non-tender, bowel sounds active, no masses, no organomegaly    Extremities:   Extremities normal, atraumatic, no cyanosis or edema   Pulses:      Skin:   Skin is warm and dry,  no rashes or palpable lesions   Neurologic:   no focal deficits noted, demented and not talking.      Lab Results (last 72 hours)     Procedure Component Value Units Date/Time    Protime-INR [[id number]]  (Abnormal) Collected:  18    Specimen:  Blood Updated:  18     Protime 28.8 (H) Seconds      INR 2.77 (H)     Protime-INR [[id number]]  (Abnormal) Collected:  09/02/18 1011    Specimen:  Blood Updated:  09/02/18 1038     Protime 23.6 (H) Seconds      INR 2.14 (H)    POC Glucose Once [[id number]]  (Normal) Collected:  09/02/18 1016    Specimen:  Blood Updated:  09/02/18 1017     Glucose 107 mg/dL     Narrative:       Meter: [id number] : [id number] Emil Stein RN    Protime-INR [[id number]]  (Abnormal) Collected:  09/01/18 0810    Specimen:  Blood Updated:  09/01/18 0910     Protime 22.6 (H) Seconds      INR 2.03 (H)        Data Review:                    Lab Results  Lab Value Date/Time   TROPONINT <0.010 08/13/2018 1209               Invalid input(s): PROT, LABALBU          Glucose   Date/Time Value Ref Range Status   09/02/2018 1016 107 70 - 130 mg/dL Final       Results from last 7 days  Lab Units 09/03/18  0735 09/02/18  1011 09/01/18  0810   INR  2.77* 2.14* 2.03*       Patient Active Problem List   Diagnosis Code   • Lumbar compression fracture (CMS/[facility]) S32.000A   • Closed nondisplaced intertrochanteric fracture of left femur (CMS/[facility]) S72.145A   • History of DVT (deep vein thrombosis) Z86.718   • Chronic anticoagulation Z79.01   • Opiates and related narcotics causing adverse effect T40.605A   • Fall W19.XXXA   • Dementia with behavioral disturbance F03.91   • Depression F32.9   • Arthritis of spine (CMS/[facility]) M46.90       Assessment:  Active Hospital Problems    Diagnosis Date Noted   • **Dementia with behavioral disturbance [F03.91] 08/13/2018   • Depression [F32.9] 08/13/2018   • Arthritis of spine (CMS/[facility]) [M46.90] 08/13/2018   • History of DVT (deep vein thrombosis) [Z86.718] 03/22/2017   • Chronic anticoagulation [Z79.01] 03/22/2017      Resolved Hospital Problems    Diagnosis Date Noted Date Resolved   No resolved problems to display.       Plan:  Will check some more labs tomorrow. Will follow.    Raoul Gallardo MD  9/3/2018  3:04 PM

## 2018-09-03 NOTE — PLAN OF CARE
Problem: Patient Care Overview  Goal: Plan of Care Review  Outcome: Ongoing (interventions implemented as appropriate)  P   09/03/18 0144 09/03/18 0522   Plan of Care Review   Progress --  declining   OTHER   Outcome Summary --  t in catatonic state throughout most of shift. Requires repeated stimulation to arouse. Medication compliant with encouragement. Will continue to monitor and assess.    Coping/Psychosocial   Plan of Care Reviewed With patient --    Coping/Psychosocial   Patient Agreement with Plan of Care unable to participate --        Problem: Overarching Goals (Adult)  Goal: Adheres to Safety Considerations for Self and Others  Outcome: Ongoing (interventions implemented as appropriate)    Goal: Optimized Coping Skills in Response to Life Stressors  Outcome: Ongoing (interventions implemented as appropriate)    Goal: Develops/Participates in Therapeutic [place] to Support Successful Transition  Outcome: Ongoing (interventions implemented as appropriate)      Problem: Cognitive Impairment (Psychotic Signs/Symptoms) (Adult)  Goal: Improved Thought Clarity/Organization (Psychotic Signs/Symptoms)  Outcome: Ongoing (interventions implemented as appropriate)      Problem: Mental State/Mood Impairment (Psychotic Signs/Symptoms) (Adult)  Goal: Improved Mental State/Mood (Psychotic Signs/Symptoms)  Outcome: Ongoing (interventions implemented as appropriate)      Problem: Sleep Impairment (Psychotic Signs/Symptoms) (Adult)  Goal: Improved Sleep Hygiene (Psychotic Signs/Symptoms)  Outcome: Ongoing (interventions implemented as appropriate)      Problem: Fall Risk (Adult)  Goal: Absence of Fall  Outcome: Ongoing (interventions implemented as appropriate)      Problem: Skin Injury Risk (Adult)  Goal: Skin Health and Integrity  Outcome: Ongoing (interventions implemented as appropriate)

## 2018-09-04 NOTE — CONSULTS
Subjective     REASON FOR CONSULTATION:  Provide an opinion on any further workup or treatment on:    Thrombocytopenia                       REQUESTING PHYSICIAN: Jerome Garcia*      HISTORY OF PRESENT ILLNESS:      Britany Prutit is a 88 y.o. patient who was admitted on 8/13/2018 with a manic phase of bipolar disorder. The patient is unable to provide history. The history was obtained from the patient's RN and the chart.     The patient had poor oral intake for the past 2 days. She had episodes of nausea and vomiting earlier today. No blood in the emesis.     The patient is on chronic anticoagulation with Coumadin due to 2 episodes of blood clots and due to atrial fibrillation.  She was noted to have a platelet count of 66,000 today. Last platelet count from 8/13/18 was normal at 475,000.         Past Medical History:   Diagnosis Date   • Arthritis of back    • Arthritis of spine (CMS/HCC)    • Back pain    • Depression    • Hx of blood clots     2 seperate episoides. Last one over 20 years ago. Also s/p IVC filter       Past Surgical History:   Procedure Laterality Date   • BREAST SURGERY     • CATARACT EXTRACTION     • GALLBLADDER SURGERY     • HYSTERECTOMY     • JOINT REPLACEMENT  2014    LEFT   • KNEE ARTHROPLASTY, PARTIAL REPLACEMENT     • KYPHOPLASTY N/A 4/25/2016    Procedure: L1 kyphoplasty;  Surgeon: Jd Ibrahim MD;  Location: [facility] 18/19;  Service:    • VENA CAVA FILTER INSERTION           SCHEDULED MEDS:    acetaminophen 1,300 mg Oral Daily   ARIPiprazole 5 mg Oral Nightly   buPROPion 75 mg Oral TID   furosemide 20 mg Oral Daily   warfarin 4 mg Oral Daily     INFUSIONS:    dextrose 75 mL/hr     PRN MEDS:  acetaminophen  •  aluminum-magnesium hydroxide-simethicone  •  LORazepam  •  LORazepam  •  magnesium hydroxide  •  melatonin  •  OLANZapine  •  ondansetron  •  sodium chloride    ALLERGIES:  No Known Allergies     Social History     Social History   • Marital status:       Spouse name: N/A   • Number of children: N/A   • Years of education: N/A     Occupational History   • Not on file.     Social History Main Topics   • Smoking status: Never Smoker   • Smokeless tobacco: Never Used   • Alcohol use No   • Drug use: Unknown   • Sexual activity: Defer     Other Topics Concern   • Not on file     Social History Narrative   • No narrative on file       Cancer-related family history is not on file.    REVIEW OF SYSTEMS:   Unable to obtain due to patient's confusion.     Objective   VITAL SIGNS:  Vitals:    09/03/18 0607 09/03/18 1539 09/04/18 0636 09/04/18 1500   BP: 124/72 169/89 158/84 158/88   BP Location: Left arm Left arm Left arm Right arm   Patient Position: Lying Sitting Lying Sitting   Pulse: 97 90 95 104   Resp: 18 17 16 18   Temp: Comment: refused 97.3 °F (36.3 °C) 97.3 °F (36.3 °C) 96.6 °F (35.9 °C)   TempSrc:  Oral Oral Oral   SpO2: 94% 90% 94% 93%       Wt Readings from Last 3 Encounters:   08/13/18 61.2 kg (135 lb)   11/01/17 58.5 kg (129 lb)   10/23/17 58.5 kg (129 lb)       PHYSICAL EXAMINATION:   GENERAL:  Awake but not in acute distress.  SKIN:  No skin rashes or lesions. Ecchymosis over both upper extremties.  HEAD:  Normocephalic.  EYES:  No Jaundice. No Pallor. VINCE. EOMI.  MOUTH: dry mucus membranes.  No Ulcers. No Thrush. No Exudates.  NECK:  Supple with Good ROM. No Thyromegaly. No Masses.  LYMPHATICS:  No cervical or supraclavicular Lymphadenopathy.  CHEST:  Lungs clear to auscultation. No added sounds.  CARDIAC:  Normal S1 & S2. No Murmurs. Tachycardic.   ABDOMEN:  Soft. No tenderness. No Hepatomegaly. No Splenomegaly. No masses.  EXTREMITIES:  No Edema. No Cyanosis. No Calf tenderness.  NEUROLOGICAL:  No Focal neurological deficits.      RESULT REVIEW:     Results from last 7 days  Lab Units 09/04/18  0635   WBC 10*3/mm3 9.75   NEUTROS ABS 10*3/mm3 7.80   LYMPHS ABS 10*3/mm3 1.17   HEMOGLOBIN g/dL 15.2   HEMATOCRIT % 46.9*   PLATELETS 10*3/mm3 66*       Results  from last 7 days  Lab Units 09/04/18  0646   SODIUM mmol/L 147*   POTASSIUM mmol/L 4.6   CHLORIDE mmol/L 103   CO2 mmol/L 18.1*   BUN mg/dL 39*   CREATININE mg/dL 0.88   CALCIUM mg/dL 9.8       Results from last 7 days  Lab Units 09/04/18  0635 09/03/18  0735 09/02/18  1011   INR  2.99* 2.77* 2.14*       I reviewed the peripheral blood smear. No platelet clumping was seen. Platelets were decreased. WBC had normal morphology. No premature WBC or blasts were seen.        Assessment/Plan   1.  Acute thrombocytopenia. On review of old labs, the patient had normal platelet counts in the past. Last platelet count was slightly elevated at 475,000.  No recent exposure to heparin or Lovenox. Her medicines are not commonly associated with development of thrombocytopenia (reported as <1% with Wellbutrin and Abilify). WBC and RBC are normal and this argues against a bone marrow pathology.     2.  Nausea and vomiting. Exam today was unremarkable.       PLAN:    1.  Hold Coumadin for now.    2.  Obtain IPF, B12, folate, MMA, fibrinogen and PTT.     3.  We will monitor platelets closely.          Cameron Larios MD  09/04/18

## 2018-09-04 NOTE — PLAN OF CARE
Problem: Patient Care Overview  Goal: Interprofessional Rounds/Family Conf  Outcome: Ongoing (interventions implemented as appropriate)   09/04/18 1024   Interdisciplinary Rounds/Family Conf   Summary Treatment team met to review plan of care. Pt was accepted to [facility] for skilled, memory care. Pt has been diagnosed with dementia. Pending progress, pt will continue to collaborate with pt's family regarding expected discharge.    Interdisciplinary Rounds/Family Conf   Participants ;psychiatrist;social work;nursing     Patient/Guardian Signature: __________________________________            Psychiatrist Signature: ______________________________________             Therapist Signature: ________________________________________         Nurse Signature: ___________________________________________          Staff Signature: ____________________________________________            Staff Signature: ____________________________________________          Staff Signature: ____________________________________________          Staff Signature:                                                                                                      Problem: Cognitive Impairment (Psychotic Signs/Symptoms) (Adult)  Goal: Improved Thought Clarity/Organization (Psychotic Signs/Symptoms)  Outcome: Ongoing (interventions implemented as appropriate)      Problem: Psychomotor Movement Impairment (Psychotic Signs/Symptoms) (Adult)  Goal: Improved Psychomotor Symptoms (Psychotic Signs/Symptoms)  Outcome: Ongoing (interventions implemented as appropriate)      Problem: Mental State/Mood Impairment (Psychotic Signs/Symptoms) (Adult)  Goal: Improved Mental State/Mood (Psychotic Signs/Symptoms)  Outcome: Ongoing (interventions implemented as appropriate)      Problem: Self-expression Impairment (Psychotic Signs/Symptoms) (Adult)  Goal: Improved Self-Expression (Psychotic Signs/Symptoms)  Outcome: Ongoing (interventions  implemented as appropriate)

## 2018-09-04 NOTE — PLAN OF CARE
Problem: Patient Care Overview  Goal: Plan of Care Review  Outcome: Ongoing (interventions implemented as appropriate)   09/03/18 2345 09/04/18 0546   Plan of Care Review   Progress --  declining   OTHER   Outcome Summary --  Pt continues to be lethargic this shift. Pt's medication withheld d/t pt not able to safely swallow. Will continue to monitor and assess.   Coping/Psychosocial   Plan of Care Reviewed With patient --    Coping/Psychosocial   Patient Agreement with Plan of Care unable to participate --          Problem: Overarching Goals (Adult)  Goal: Adheres to Safety Considerations for Self and Others  Outcome: Ongoing (interventions implemented as appropriate)    Goal: Optimized Coping Skills in Response to Life Stressors  Outcome: Ongoing (interventions implemented as appropriate)    Goal: Develops/Participates in Therapeutic [place] to Support Successful Transition  Outcome: Ongoing (interventions implemented as appropriate)      Problem: Cognitive Impairment (Psychotic Signs/Symptoms) (Adult)  Goal: Improved Thought Clarity/Organization (Psychotic Signs/Symptoms)  Outcome: Ongoing (interventions implemented as appropriate)      Problem: Psychomotor Movement Impairment (Psychotic Signs/Symptoms) (Adult)  Goal: Improved Psychomotor Symptoms (Psychotic Signs/Symptoms)  Outcome: Ongoing (interventions implemented as appropriate)      Problem: Mental State/Mood Impairment (Psychotic Signs/Symptoms) (Adult)  Goal: Improved Mental State/Mood (Psychotic Signs/Symptoms)  Outcome: Ongoing (interventions implemented as appropriate)      Problem: Self-expression Impairment (Psychotic Signs/Symptoms) (Adult)  Goal: Improved Self-Expression (Psychotic Signs/Symptoms)  Outcome: Ongoing (interventions implemented as appropriate)      Problem: Sleep Impairment (Psychotic Signs/Symptoms) (Adult)  Goal: Improved Sleep Hygiene (Psychotic Signs/Symptoms)  Outcome: Ongoing (interventions implemented as  appropriate)      Problem: Fall Risk (Adult)  Goal: Absence of Fall  Outcome: Ongoing (interventions implemented as appropriate)      Problem: Skin Injury Risk (Adult)  Goal: Skin Health and Integrity  Outcome: Ongoing (interventions implemented as appropriate)

## 2018-09-04 NOTE — PROGRESS NOTES
DAILY PROGRESS NOTE  [facility]    Patient Identification:  Name: Britany Pruitt  Age: 88 y.o.  Sex: female  :  1930  MRN: [id number]         Primary Care Physician: Jerome Moore Jr., MD    Subjective:  Interval History:Not talking today. Less Sedated. Poor PO intake.    Objective:    Scheduled Meds:    acetaminophen 1,300 mg Oral Daily   ARIPiprazole 5 mg Oral Nightly   buPROPion 75 mg Oral TID   furosemide 20 mg Oral Daily   warfarin 4 mg Oral Daily     Continuous Infusions:     Vital signs in last 24 hours:  Temp:  [97.3 °F (36.3 °C)] 97.3 °F (36.3 °C)  Heart Rate:  [90-95] 95  Resp:  [16-17] 16  BP: (158-169)/(84-89) 158/84    Intake/Output:  No intake or output data in the 24 hours ending 18 1157    Exam:  /84 (BP Location: Left arm, Patient Position: Lying)   Pulse 95   Temp 97.3 °F (36.3 °C) (Oral)   Resp 16   SpO2 94%     General Appearance:    Not talking, no distress   Head:    Normocephalic, without obvious abnormality, atraumatic   Eyes:       Throat:   Lips, tongue, gums normal   Neck:   Supple, symmetrical, trachea midline, no JVD   Lungs:     Clear to auscultation bilaterally, respirations unlabored   Chest Wall:    No tenderness or deformity    Heart:    Regular rate and rhythm, S1 and S2 normal, no murmur,no  Rub or gallop   Abdomen:     Soft, non-tender, bowel sounds active, no masses, no organomegaly    Extremities:   Extremities normal, atraumatic, no cyanosis or edema   Pulses:      Skin:   Skin is warm and dry,  no rashes or palpable lesions   Neurologic:   no focal deficits noted, demented and not talking.      Lab Results (last 72 hours)     Procedure Component Value Units Date/Time    Protime-INR [[id number]]  (Abnormal) Collected:  18 0735    Specimen:  Blood Updated:  18 0839     Protime 28.8 (H) Seconds      INR 2.77 (H)    Protime-INR [[id number]]  (Abnormal) Collected:  18 1011    Specimen:  Blood Updated:  18 1038      Protime 23.6 (H) Seconds      INR 2.14 (H)    POC Glucose Once [[id number]]  (Normal) Collected:  09/02/18 1016    Specimen:  Blood Updated:  09/02/18 1017     Glucose 107 mg/dL     Narrative:       Meter: [id number] : [id number] Emil Stein RN    Protime-INR [[id number]]  (Abnormal) Collected:  09/01/18 0810    Specimen:  Blood Updated:  09/01/18 0910     Protime 22.6 (H) Seconds      INR 2.03 (H)        Data Review:    Results from last 7 days  Lab Units 09/04/18  0646   SODIUM mmol/L 147*   POTASSIUM mmol/L 4.6   CHLORIDE mmol/L 103   CO2 mmol/L 18.1*   BUN mg/dL 39*   CREATININE mg/dL 0.88   GLUCOSE mg/dL 127*   CALCIUM mg/dL 9.8       Results from last 7 days  Lab Units 09/04/18  0635   WBC 10*3/mm3 9.75   HEMOGLOBIN g/dL 15.2   HEMATOCRIT % 46.9*   PLATELETS 10*3/mm3 66*               Lab Results  Lab Value Date/Time   TROPONINT <0.010 08/13/2018 1209               Invalid input(s): PROT, LABALBU          Glucose   Date/Time Value Ref Range Status   09/02/2018 1016 107 70 - 130 mg/dL Final       Results from last 7 days  Lab Units 09/04/18  0635 09/03/18  0735 09/02/18  1011   INR  2.99* 2.77* 2.14*       Patient Active Problem List   Diagnosis Code   • Lumbar compression fracture (CMS/[facility]) S32.000A   • Closed nondisplaced intertrochanteric fracture of left femur (CMS/[facility]) S72.145A   • History of DVT (deep vein thrombosis) Z86.718   • Chronic anticoagulation Z79.01   • Opiates and related narcotics causing adverse effect T40.605A   • Fall W19.XXXA   • Dementia with behavioral disturbance F03.91   • Depression F32.9   • Arthritis of spine (CMS/[facility]) M46.90       Assessment:  Active Hospital Problems    Diagnosis Date Noted   • **Dementia with behavioral disturbance [F03.91] 08/13/2018   • Depression [F32.9] 08/13/2018   • Arthritis of spine (CMS/[facility]) [M46.90] 08/13/2018   • History of DVT (deep vein thrombosis) [Z86.718] 03/22/2017   • Chronic anticoagulation [Z79.01] 03/22/2017      [facility]  Problems    Diagnosis Date Noted Date Resolved   No resolved problems to display.       Plan:  Will check  more labs tomorrow. Will follow.  May need to give some IV fluids if PO intake not improving. Discussed with nurse to encourage more fluid intake and try to get her to eat.    Raoul Gallardo MD  9/4/2018  11:57 AM

## 2018-09-04 NOTE — SIGNIFICANT NOTE
09/04/18 1307   Rehab Treatment   Discipline physical therapist   Reason Treatment Not Performed other (see comments)  (pt has been checked on x 2 today. pt with eyes closed & moving UEs in various motions.  does not open eyes or respond when spoken to. will check on pt tomorrow)   Recommendation   PT - Next Appointment 09/05/18

## 2018-09-04 NOTE — PROGRESS NOTES
"Continued Stay Note  [facility]     Patient Name: Britany Pruitt  MRN: [id number]  Today's Date: 9/4/2018    Admit Date: 8/13/2018          Discharge Plan     Row Name 09/04/18 1129       Plan    Plan Comments SW received call from Missy TRINIDAD, from [employer] representing [facility], regarding pt's current state and hoping that pt makes progress in order to move forward with placement on their skilled, memory care unit. They are continuing to \"follow\" the pt while on [facility]. SW will continue to update Missy TRINIDAD regarding pt's progress. SW spoke with pt's dtr/POA, Kim Logan [phone], to provide an update on pt's treatment. Pt's dtr stated that she spoke with Dr. Garcia regarding treatment update. She expressed her concern about pt's oversedation and possible initiation of ECT. She stated that she is the pt's healthcare surrogate and is able to approve ECT, as she has in the past for the pt. Pt's dtr also expressed her concern about [facility] recanting the acceptance of the pt for their skilled nursing unit. SW explained that collaboration will continue to occur between [facility] liaison and SW to coordinate care. She understood this plan and SW will continue to update the pt's dtr.               Discharge Codes    No documentation.           Jillian Acosta LCSW    "

## 2018-09-04 NOTE — PROGRESS NOTES
The patient has seems to taken a turn for the worse over the weekend..  She is unresponsive except to internal stimuli during today's attempted interview.  She does not converse with this physician and has been exhibiting very poor oral intake.  Dr. wagner which the patient to by mouth Abilify and when necessary Ativan secondary to some oversedation.  I spoke with the patient's daughter today regarding possible initiation of ECT to which the patient responded in the past.  I have concerns that the patient is unable to give informed consent for this consult procedure, however.

## 2018-09-05 NOTE — PROGRESS NOTES
DAILY PROGRESS NOTE  [facility]    Patient Identification:  Name: Britany Pruitt  Age: 88 y.o.  Sex: female  :  1930  MRN: [id number]         Primary Care Physician: Jerome Moore Jr., MD    Subjective:  Interval History:Not talking today. Less Sedated. Poor PO intake. Vomited yesterday.     Objective:    Scheduled Meds:    acetaminophen 1,300 mg Oral Daily   ARIPiprazole 5 mg Oral Nightly   buPROPion 75 mg Oral TID   furosemide 20 mg Oral Daily     Continuous Infusions:    dextrose 75 mL/hr Last Rate: 75 mL/hr (18 1101)       Vital signs in last 24 hours:  Temp:  [97.4 °F (36.3 °C)-98.6 °F (37 °C)] 97.4 °F (36.3 °C)  Heart Rate:  [] 88  Resp:  [18] 18  BP: (125-173)/(74-89) 125/74    Intake/Output:    Intake/Output Summary (Last 24 hours) at 18 1517  Last data filed at 18 1101   Gross per 24 hour   Intake             1800 ml   Output                0 ml   Net             1800 ml       Exam:  /74 (BP Location: Right arm, Patient Position: Lying)   Pulse 88   Temp 97.4 °F (36.3 °C) (Oral)   Resp 18   SpO2 96%     General Appearance:    Not talking, no distress   Head:    Normocephalic, without obvious abnormality, atraumatic   Eyes:       Throat:   Lips, tongue, gums normal   Neck:   Supple, symmetrical, trachea midline, no JVD   Lungs:     Clear to auscultation bilaterally, respirations unlabored   Chest Wall:    No tenderness or deformity    Heart:    Regular rate and rhythm, S1 and S2 normal, no murmur,no  Rub or gallop   Abdomen:     Soft, non-tender, bowel sounds active, no masses, no organomegaly    Extremities:   Extremities normal, atraumatic, no cyanosis or edema   Pulses:      Skin:   Skin is warm and dry,  no rashes or palpable lesions   Neurologic:   no focal deficits noted, demented and not talking.      Lab Results (last 72 hours)     Procedure Component Value Units Date/Time    Protime-INR [[id number]]  (Abnormal) Collected:  18 4636     Specimen:  Blood Updated:  09/03/18 0839     Protime 28.8 (H) Seconds      INR 2.77 (H)    Protime-INR [[id number]]  (Abnormal) Collected:  09/02/18 1011    Specimen:  Blood Updated:  09/02/18 1038     Protime 23.6 (H) Seconds      INR 2.14 (H)    POC Glucose Once [[id number]]  (Normal) Collected:  09/02/18 1016    Specimen:  Blood Updated:  09/02/18 1017     Glucose 107 mg/dL     Narrative:       Meter: [id number] : [id number] Emil Stein RN    Protime-INR [[id number]]  (Abnormal) Collected:  09/01/18 0810    Specimen:  Blood Updated:  09/01/18 0910     Protime 22.6 (H) Seconds      INR 2.03 (H)        Data Review:    Results from last 7 days  Lab Units 09/05/18  0409 09/04/18  0646   SODIUM mmol/L 146* 147*   POTASSIUM mmol/L 3.3* 4.6   CHLORIDE mmol/L 102 103   CO2 mmol/L 26.5 18.1*   BUN mg/dL 47* 39*   CREATININE mg/dL 0.69 0.88   GLUCOSE mg/dL 206* 127*   CALCIUM mg/dL 10.3 9.8       Results from last 7 days  Lab Units 09/05/18  0409 09/04/18  0635   WBC 10*3/mm3 8.98 9.75   HEMOGLOBIN g/dL 13.5 15.2   HEMATOCRIT % 42.9 46.9*   PLATELETS 10*3/mm3 666*  666* 66*       Results from last 7 days  Lab Units 09/05/18  0409   TSH mIU/mL 0.029*           Lab Results  Lab Value Date/Time   TROPONINT <0.010 08/13/2018 1209               Invalid input(s): PROT, LABALBU    Results from last 7 days  Lab Units 09/05/18  0409   TSH mIU/mL 0.029*         No results found for: POCGLU    Results from last 7 days  Lab Units 09/05/18  0409 09/04/18  0635 09/03/18  0735   INR  3.53* 2.99* 2.77*       Patient Active Problem List   Diagnosis Code   • Lumbar compression fracture (CMS/HCC) S32.000A   • Closed nondisplaced intertrochanteric fracture of left femur (CMS/HCC) S72.145A   • History of DVT (deep vein thrombosis) Z86.718   • Chronic anticoagulation Z79.01   • Opiates and related narcotics causing adverse effect T40.605A   • Fall W19.XXXA   • Dementia with behavioral disturbance F03.91   • Depression F32.9   •  Arthritis of spine (CMS/[facility]) M46.90       Assessment:  Active Hospital Problems    Diagnosis Date Noted   • **Dementia with behavioral disturbance [F03.91] 08/13/2018   • Depression [F32.9] 08/13/2018   • Arthritis of spine (CMS/[facility]) [M46.90] 08/13/2018   • History of DVT (deep vein thrombosis) [Z86.718] 03/22/2017   • Chronic anticoagulation [Z79.01] 03/22/2017      Resolved Hospital Problems    Diagnosis Date Noted Date Resolved   No resolved problems to display.       Plan:  Will check  more labs tomorrow. Will follow.  Continue some IV fluids if PO intake not improving. Discussed with nurse to encourage more fluid intake and try to get her to eat.  Will CT abdomen. Platelets now elevated. Check UA and culture.    Raoul Gallardo MD  9/5/2018  3:17 PM

## 2018-09-05 NOTE — PLAN OF CARE
Problem: Patient Care Overview  Goal: Plan of Care Review   09/05/18 1401   OTHER   Outcome Summary Pt agreeable to therapy. Very flat and minimally verbal throughout session. Follows commands inconsistently. Decreased ambulation distance as compared to days prior due to once walking, pt stopped and would not walk farther. WIll continue to progress as tolerated.

## 2018-09-05 NOTE — THERAPY TREATMENT NOTE
Acute Care - Physical Therapy Treatment Note  [facility]     Patient Name: Britany Pruitt  : 1930  MRN: [id number]  Today's Date: 2018     Date of Referral to PT: 08/15/18  Referring Physician: Dr Garcia    Admit Date: 2018    Visit Dx:    ICD-10-CM ICD-9-CM   1. Dementia with behavioral disturbance, unspecified dementia type F03.91 294.21   2. Unsteady gait R26.81 781.2     Patient Active Problem List   Diagnosis   • Lumbar compression fracture (CMS/HCC)   • Closed nondisplaced intertrochanteric fracture of left femur (CMS/[facility])   • History of DVT (deep vein thrombosis)   • Chronic anticoagulation   • Opiates and related narcotics causing adverse effect   • Fall   • Dementia with behavioral disturbance   • Depression   • Arthritis of spine (CMS/[facility])       Therapy Treatment          Rehabilitation Treatment Summary     Row Name 18 1355             Treatment Time/Intention    Discipline physical therapist  -MG      Document Type therapy note (daily note)  -MG      Subjective Information no complaints  -MG      Mode of Treatment physical therapy;individual therapy  -MG      Patient/Family Observations supine in bed, with RN, no acute distress,   -MG      Patient Effort fair  -MG      Comment supine in bed, nods head occasionally to yes/no questions, verbalizes 2 times throughout session   -MG      Existing Precautions/Restrictions fall  -MG      Recorded by [MG] Gosselin, Megan, PT 18 1401      Row Name 18 7632             Cognitive Assessment/Intervention- PT/OT    Orientation Status (Cognition) disoriented to;person;place;situation;time  -MG      Follows Commands (Cognition) follows one step commands;25-49% accuracy;delayed response/completion;increased processing time needed;physical/tactile prompts required;repetition of directions required;verbal cues/prompting required  -MG      Safety Deficit (Cognitive) severe deficit;ability to follow commands;awareness of need for  assistance;insight into deficits/self awareness;judgment;problem solving;safety precautions awareness  -MG      Personal Safety Interventions fall prevention program maintained;gait belt;muscle strengthening facilitated;nonskid shoes/slippers when out of bed;supervised activity  -MG      Recorded by [MG] Gosselin, Megan, PT 09/05/18 1401      Row Name 09/05/18 1355             Safety Issues, Functional Mobility    Impairments Affecting Function (Mobility) balance;cognition;coordination;endurance/activity tolerance;strength  -MG      Recorded by [MG] Gosselin, Megan, PT 09/05/18 1401      Row Name 09/05/18 1355             Bed Mobility Assessment/Treatment    Supine-Sit [place]erry (Bed Mobility) moderate assist (50% patient effort)  -MG      Sit-Supine [place] (Bed Mobility) moderate assist (50% patient effort)  -MG      Bed Mobility, Safety Issues cognitive deficits limit understanding  -MG      Assistive Device (Bed Mobility) bed rails;head of bed elevated  -MG      Comment (Bed Mobility) cues for sequencing, able to assist with LEs  -MG      Recorded by [MG] Gosselin, Megan, PT 09/05/18 1401      Row Name 09/05/18 1355             Sit-Stand Transfer    Sit-Stand [place]erry (Transfers) minimum assist (75% patient effort)  -MG      Assistive Device (Sit-Stand Transfers) walker, front-wheeled  -MG      Recorded by [MG] Gosselin, Megan, PT 09/05/18 1401      Row Name 09/05/18 1350             Stand-Sit Transfer    Stand-Sit [place]erry (Transfers) minimum assist (75% patient effort)  -MG      Assistive Device (Stand-Sit Transfers) walker, front-wheeled  -MG      Recorded by [MG] Gosselin, Megan, PT 09/05/18 1401      Row Name 09/05/18 1355             Gait/Stairs Assessment/Training    [place]erry Level (Gait) minimum assist (75% patient effort)  -MG      Assistive Device (Gait) walker, front-wheeled  -MG      Distance in Feet (Gait) 5  -MG      Pattern (Gait) step-to  -MG      Deviations/Abnormal Patterns  (Gait) vonnie decreased;stride length decreased  -MG      Bilateral Gait Deviations forward flexed posture  -MG      Comment (Gait/Stairs) very forward flexed initially, able to stand up straight with cues, very slow vonnie, frequent cues to stay on task and decrease distractions   -MG      Recorded by [MG] Gosselin, Megan, PT 09/05/18 1401      Row Name 09/05/18 1355             Positioning and Restraints    Pre-Treatment Position in bed  -MG      Post Treatment Position bed  -MG      In Bed supine;encouraged to call for assist;exit alarm on;notified nsg  -MG      Recorded by [MG] Gosselin, Megan, PT 09/05/18 1401      Row Name 09/05/18 1355             Pain Scale: FACES Pre/Post-Treatment    Pain: FACES Scale, Pretreatment 0-->no hurt  -MG      Pain: FACES Scale, Post-Treatment 0-->no hurt  -MG      Recorded by [MG] Gosselin, Megan, PT 09/05/18 1401        User Key  (r) = Recorded By, (t) = Taken By, (c) = Cosigned By    Initials Name Effective Dates Discipline    MG Gosselin, Megan, PT 04/03/18 -  PT                     Physical Therapy Education     Title: PT OT SLP Therapies (Active)     Topic: Physical Therapy (Active)     Point: Mobility training (Active)    Learning Progress Summary     Learner Status Readiness Method Response Comment Documented by    Patient Active Nonacceptance E NR  MG 09/05/18 1401     Done Acceptance E VU,NR  LH 08/31/18 1051     Active Acceptance E,TB,D NR   08/30/18 1613     Active Acceptance E NR  JK 08/28/18 1236     Active Acceptance E,TB NR  LB 08/27/18 1029     Done Acceptance E,D VU,NR bed mobility LB1 08/24/18 1418     Active Acceptance E,D NR with bed mobility LB1 08/23/18 1018     Active Acceptance E NR  SK 08/22/18 2336     Active Acceptance E,TB,D NR  NEL 08/22/18 1223     Done Acceptance E,TB,D VU,DU,NR Pt need reinforcement. KP 08/21/18 1445     Active Acceptance E NR  LB2 08/20/18 1222     Active Acceptance E NR  LB1 08/17/18 1243     Active Acceptance E,D NR    08/16/18 1320     Done Acceptance E,D NR,DU  JK 08/15/18 1618    Family Done Acceptance E,TB,D VU,DU,NR Pt need reinforcement.  08/21/18 1445     Active Acceptance E,D NR   08/16/18 1320     Done Acceptance E,D NR,DU  JK 08/15/18 1618          Point: Home exercise program (Done)    Learning Progress Summary     Learner Status Readiness Method Response Comment Documented by    Patient Done Acceptance E VU,NR   08/31/18 1051     Active Acceptance E,TB,D NR   08/30/18 1613     Active Acceptance E NR  SK 08/22/18 2336     Done Acceptance E,TB,D VU,DU,NR Pt need reinforcement.  08/21/18 1445     Active Acceptance E NR  [facility] 08/17/18 1243    Family Done Acceptance E,TB,D VU,DU,NR Pt need reinforcement.  08/21/18 1445          Point: Body mechanics (Done)    Learning Progress Summary     Learner Status Readiness Method Response Comment Documented by    Patient Done Acceptance E VU,NR   08/31/18 1051     Active Acceptance E,TB,D NR   08/30/18 1613     Active Acceptance E NR  JK 08/28/18 1236     Active Acceptance E NR  SK 08/22/18 2336     Active Acceptance E NR  [facility] 08/17/18 1243          Point: Precautions (Done)    Learning Progress Summary     Learner Status Readiness Method Response Comment Documented by    Patient Done Acceptance E VU,NR   08/31/18 1051     Active Acceptance E,TB,D NR   08/30/18 1613     Active Acceptance E NR  [facility] 08/17/18 1243                      User Key     Initials Effective Dates Name Provider Type Discipline    1 06/08/18 -  Carola Archer, PT Physical Therapist PT     06/08/18 -  Alla Cruz, PT Physical Therapist PT     04/03/18 -  Hannah Dacosta, PT Physical Therapist PT     04/03/18 -  Carola Pruitt, PT Physical Therapist PT    [facility] 03/07/18 -  Carola Green, PTA Physical Therapy Assistant PT     04/03/18 -  Mary Ann Machuca, PT Physical Therapist PT     04/03/18 -  Mary Austin, PT Physical Therapist PT    SK 05/15/17 -  Aracelis Belcher RN  Registered Nurse Nurse     04/03/18 -  Gosselin, Megan, PT Physical Therapist PT                    PT Recommendation and Plan     Outcome Summary: Pt agreeable to therapy. Very flat and minimally verbal throughout session. Follows commands inconsistently. Decreased ambulation distance as compared to days prior due to once walking, pt stopped and would not walk farther. WIll continue to progress as tolerated.           Outcome Measures     Row Name 09/05/18 1400             How much help from another person do you currently need...    Turning from your back to your side while in flat bed without using bedrails? 3  -MG      Moving from lying on back to sitting on the side of a flat bed without bedrails? 2  -MG      Moving to and from a bed to a chair (including a wheelchair)? 2  -MG      Standing up from a chair using your arms (e.g., wheelchair, bedside chair)? 3  -MG      Climbing 3-5 steps with a railing? 2  -MG      To walk in hospital room? 3  -MG      AM-PAC 6 Clicks Score 15  -MG         Functional Assessment    Outcome Measure Options AM-PAC 6 Clicks Basic Mobility (PT)  -MG        User Key  (r) = Recorded By, (t) = Taken By, (c) = Cosigned By    Initials Name Provider Type    MG Gosselin, Megan, PT Physical Therapist           Time Calculation:         PT Charges     Row Name 09/05/18 1403 09/05/18 1011          Time Calculation    Start Time 1340  -MG  --     Stop Time 1353  -MG  --     Time Calculation (min) 13 min  -MG  --     PT Received On 09/05/18  -MG  --     PT - Next Appointment 09/06/18  -MG 09/05/18  -MG        Time Calculation- PT    Total Timed Code Minutes- PT 13 minute(s)  -MG  --       User Key  (r) = Recorded By, (t) = Taken By, (c) = Cosigned By    Initials Name Provider Type    MG Gosselin, Megan, PT Physical Therapist        Therapy Suggested Charges     Code   Minutes Charges    None           Therapy Charges for Today     Code Description Service Date Service Provider Modifiers Qty     [id number]  PT THER PROC EA 15 MIN 9/5/2018 Gosselin, Megan, PT GP 1          PT G-Codes  Outcome Measure Options: AM-PAC 6 Clicks Basic Mobility (PT)  AM-PAC 6 Clicks Score: 15    Megan Gosselin, PT  9/5/2018

## 2018-09-05 NOTE — PROGRESS NOTES
Subjective         History of Present Illness  Low plat likely lab error, back to high count today, likely uti and retention.    Past Medical History, Past Surgical History, Social History, Family History have been reviewed and are without significant changes except as mentioned.    Review of Systems discussed with nurse about her situation, pt is not verbal      Medications:  The current medication list was reviewed in the EMR    ALLERGIES:  No Known Allergies    Objective      Vitals:    09/05/18 0100 09/05/18 0642 09/05/18 1341 09/05/18 1530   BP: 173/89 139/75 125/74 147/83   BP Location: Right arm Right arm Right arm Right arm   Patient Position: Lying Lying Lying Lying   Pulse: 102 119 88 102   Resp: 18 18 18 12   Temp: 98.6 °F (37 °C) 97.4 °F (36.3 °C)  97.4 °F (36.3 °C)   TempSrc: Oral Oral  Oral   SpO2: 95% 96% 96% 97%     No flowsheet data found.    Physical Exam  Distended abdomen, mild diffuse pain, vomites, hiccups, no jaundice. Awake barely responsive to verbal command.  RECENT LABS:  Hematology WBC   Date Value Ref Range Status   09/05/2018 8.98 4.50 - 10.70 10*3/mm3 Final     RBC   Date Value Ref Range Status   09/05/2018 4.79 3.90 - 5.20 10*6/mm3 Final     Hemoglobin   Date Value Ref Range Status   09/05/2018 13.5 11.9 - 15.5 g/dL Final     Hematocrit   Date Value Ref Range Status   09/05/2018 42.9 35.6 - 45.5 % Final     Platelets   Date Value Ref Range Status   09/05/2018 666 (H) 140 - 500 10*3/mm3 Final   09/05/2018 666 (H) 140 - 500 10*3/mm3 Final      Fibrinogen   Order: [id number]   Status:  Final result   Visible to patient:  No (Not Released)    Ref Range & Units 04:09   Fibrinogen 219 - 464 mg/dL 606     Resulting Agency  [facility]      Specimen Collected: 09/05/18 04:09 Last Resulted: 09/05/18 04:58            Immature Platelet Fraction   Order: [id number]   Status:  Final result   Visible to patient:  No (Not Released)    Ref Range & Units 04:09   IPF 0.90 - 6.50 % 1.90    Platelets 140  - 500 10*3/mm3 666     Resulting Agency   [facility]      Specimen Collected: 09/05/18 04:09 Last Resulted: 09/05/18 05:02              Folate   Order: [id number]   Status:  Final result   Visible to patient:  No (Not Released)    Ref Range & Units 04:09   Folate 4.78 - 24.20 ng/mL >20.00    Resulting Agency   [facility]      Specimen Collected: 09/05/18 04:09 Last Resulted: 09/05/18              Vitamin B12   Order: [id number]   Status:  Final result   Visible to patient:  No (Not Released)    Ref Range & Units 04:09   Vitamin B-12 211 - 946 pg/mL >2,000     Resulting [facility]      Specimen Collected: 09/05/18 04:09 Last Resulted: 09/05/18 05:36                    Assessment/Plan  This patient’s platelet count has recovered by miracle today making me to presume that the low platelet count yesterday was a lab mistake or computer mistake in regard to the translation of the numbers. Nobody has a platelet count of 66,000 one day and the next number is 666,000, again, making me to believe that this was a mistake. Under the present circumstances, I find nothing else to do for her platelet count but I do believe that the persistency of nausea and vomiting in this patient along with her abdominal distention and hiccups makes me to believe that she has an intraabdominal pathology. Obviously, it raised the question about gallbladder problems, appendix, diverticulum and so forth and I think a CT scan needs to be done. This has been scheduled already by Dr. Gallardo. The patient’s urinalysis is abnormal and she probably will require therapy for a possible urinary tract infection.     From my point of view, I have nothing else to add besides the followup on her tomorrow.     I will discuss the numbers with the lab management tomorrow in regard to the correction of what we got yesterday.                           9/5/2018      CC:

## 2018-09-05 NOTE — PROGRESS NOTES
"Continued Stay Note  [facility]     Patient Name: Britany Pruitt  MRN: [id number]  Today's Date: 9/5/2018    Admit Date: 8/13/2018          Discharge Plan     Row Name 09/05/18 1146       Plan    Plan Comments REA received call from Missy TRINIDAD, from [facility] and associates representing [facility] [phone], regarding the facility continuing to \"follow\" the pt's inpt stay on [facility] in hopes that pt is prepared for transfer to their skilled, memory care unit. REA also provided an update regarding pt's treatment progress. REA will continue to collaborate with Missy TRINIDAD and pt's dtr/POA regarding final planning for disposition.               Discharge Codes    No documentation.           Jillian Acosta LCSW    "

## 2018-09-05 NOTE — PLAN OF CARE
Problem: Patient Care Overview  Goal: Plan of Care Review  Outcome: Ongoing (interventions implemented as appropriate)   09/05/18 0315 09/05/18 0932 09/05/18 1647   Plan of Care Review   Progress no change --  --    OTHER   Outcome Summary --  --  Pt remains confused and appeared lethargic throughout shift. Pt rambles illogically and will continue to repeat certain phrases. Pt compliant with medication crushed in applesauce. IV remains in L forearm with fluids currently running. Pt stated she had to use the bathroom, but will try and hold urine when placed on bedside commode. Fall precautions remain in place. Will continue to monitor and provide support.   Coping/Psychosocial   Plan of Care Reviewed With --  patient --    Coping/Psychosocial   Patient Agreement with Plan of Care --  unable to participate --        Problem: Overarching Goals (Adult)  Goal: Adheres to Safety Considerations for Self and Others  Outcome: Ongoing (interventions implemented as appropriate)   09/05/18 1647   Overarching Goals (Adult)   Adheres to Safety Considerations for Self and Others making progress toward outcome     Intervention: Develop and Maintain Individualized Safety Plan   09/05/18 0932 09/05/18 1600   Violence Risk   Feels Like Hurting Others no --    Previous Attempt to Harm Others no --    C-SSRS (Recent)   Wish to be Dead no --    Suicidal Thoughts no --    Suicide Behavior no --    Develop and Maintain Individualized Safety Plan   Safety Measures --  safety rounds completed       Goal: Optimized Coping Skills in Response to Life Stressors  Outcome: Ongoing (interventions implemented as appropriate)   09/05/18 1647   Overarching Goals (Adult)   Optimized Coping Skills in Response to Life Stressors making progress toward outcome     Intervention: Promote Effective Coping Strategies   09/05/18 0932   Coping/Psychosocial Interventions   Supportive Measures active listening utilized;positive reinforcement provided;verbalization  of feelings encouraged       Goal: Develops/Participates in Therapeutic [place] to Support Successful Transition  Outcome: Ongoing (interventions implemented as appropriate)   09/05/18 1647   Overarching Goals (Adult)   Develops/Participates in Therapeutic [place] to Support Successful Transition making progress toward outcome     Intervention: Foster Therapeutic [place]   09/05/18 0932   Interventions   Trust Relationship/Rapport care explained;choices provided;emotional support provided;empathic listening provided;questions answered;questions encouraged;reassurance provided;thoughts/feelings acknowledged     Intervention: Mutually Develop Transition Plan   09/05/18 0932   Mutually Develop Transition Plan   Transition Support crisis management plan promoted         Problem: Cognitive Impairment (Psychotic Signs/Symptoms) (Adult)  Intervention: Promote Thought Clarity and Organization   08/22/18 1546   Promote Thought Clarity and Organization   Mutually Determined Action Steps (Promote Thought Clarity/Organization) remains focused during activity       Goal: Improved Thought Clarity/Organization (Psychotic Signs/Symptoms)  Outcome: Ongoing (interventions implemented as appropriate)   09/04/18 1024 09/05/18 1647   Improved Thought Clarity/Organization (Psychotic Signs/Symptoms)   Improved Thought Clarity/Organization Action Step/Short Term Goal (STG) Established 09/02/18 --    Improved Thought Clarity/Organization Time Frame for Action Step (STG) --  1 day   Improved Thought Clarity/Organization Action Step (STG) Outcome --  making progress toward outcome       Problem: Psychomotor Movement Impairment (Psychotic Signs/Symptoms) (Adult)  Intervention: Manage Psychomotor Movement   08/22/18 1546   Manage Psychomotor Movement   Mutually Determined Action Steps (Manage Psychomotor Movement) exhibits decrease in agitation;adheres to medication regimen       Goal: Improved Psychomotor Symptoms (Psychotic  Signs/Symptoms)  Outcome: Ongoing (interventions implemented as appropriate)   09/04/18 1024 09/05/18 1647   Improved Psychomotor Symptoms (Psychotic Signs/Symptoms)   Improved Psychomotor Symptoms Action Step/Short Term Goal (STG) Established 09/02/18 --    Improved Psychomotor Symptoms Time Frame for Action Step (STG) --  1 day   Improved Psychomotor Symptoms Action Step (STG) Outcome --  making progress toward outcome       Problem: Mental State/Mood Impairment (Psychotic Signs/Symptoms) (Adult)  Intervention: Optimize Mental State and Mood   08/22/18 1546   Optimize Mental State and Mood   Mutually Determined Action Steps (Optimize Mental State/Mood) verbalizes increased insight       Goal: Improved Mental State/Mood (Psychotic Signs/Symptoms)  Outcome: Ongoing (interventions implemented as appropriate)   09/04/18 1024 09/05/18 1647   Improved Mental State/Mood (Psychotic Signs/Symptoms)   Improved Mental State/Mood Action Step/Short Term Goal (STG) Established 09/02/18 --    Improved Mental State/Mood Time Frame for Action Step (STG) --  1 day   Improved Mental State/Mood Action Step (STG) Outcome --  making progress toward outcome       Problem: Self-expression Impairment (Psychotic Signs/Symptoms) (Adult)  Intervention: Encourage Positive Self-Expression   08/22/18 1546 08/28/18 0854   Encourage Positive Self-Expression   Mutually Determined Action Steps (Encourage Positive Self-Expression) communicates needs verbally --    Facilitate Re-Engagement and Participation   Diversional Activity --  television       Goal: Improved Self-Expression (Psychotic Signs/Symptoms)  Outcome: Ongoing (interventions implemented as appropriate)   09/04/18 1024 09/05/18 1647   Improved Self-Expression (Psychotic Signs/Symptoms)   Improved Self-Expression Action Step/Short Term Goal (STG) Established 09/02/18 --    Improved Self-Expression Time Frame for Action Step (STG) --  1 day   Improved Self-Expression Action Step (STG)  Outcome --  making progress toward outcome       Problem: Sleep Impairment (Psychotic Signs/Symptoms) (Adult)  Intervention: Promote Healthy Sleep Hygiene   18 164   Promote Healthy Sleep Hygiene   Mutually Determined Action Steps (Promote Healthy Sleep Hygiene) sleeps 4-6 hours at night       Goal: Improved Sleep Hygiene (Psychotic Signs/Symptoms)  Outcome: Ongoing (interventions implemented as appropriate)   18 1024 18 1647   Improved Sleep Hygiene (Psychotic Signs/Symptoms)   Improved Sleep Hygiene Action Step/Short Term Goal (STG) Established 18 --    Improved Sleep Hygiene Time Frame for Action Step (STG) --  1 day   Improved Sleep Hygiene Action Step (STG) Outcome --  making progress toward outcome       Problem: Fall Risk (Adult)  Intervention: Monitor/Assist with Self Care   18   Activity   Activity Assistance Provided assistance, 1 person   Daily Care Interventions   Self-Care Promotion independence encouraged;BADL personal objects within reach     Intervention: Reduce Risk/Promote Restraint Free Environment   18 0658 18 1600   Prevent [place] Drop/Fall   Safety/Security Measures bed alarm set --    Safety Management   Environmental Safety Modification --  assistive device/personal items within reach;clutter free environment maintained;mobility aid in reach;room organization consistent   Safety Promotion/Fall Prevention --  nonskid shoes/slippers when out of bed;safety round/check completed;fall prevention program maintained     Intervention: Review Medications/Identify Contributors to Fall Risk   18 1600   Safety Management   Medication Review/Management medications reviewed       Goal: Absence of Fall  Outcome: Ongoing (interventions implemented as appropriate)   18 1647   Fall Risk (Adult)   Absence of Fall making progress toward outcome       Problem: Skin Injury Risk (Adult)  Intervention: Promote/Optimize Nutrition   18 0932   Nutrition  Interventions   Oral Nutrition Promotion calorie dense liquids provided     Intervention: Prevent/Manage Excess Moisture   09/05/18 0932   Skin Interventions   Skin Protection incontinence pads utilized     Intervention: Prevent/Minimize Shear/Friction Injuries   09/04/18 1755 09/05/18 0932   Positioning   Positioning/Transfer Devices pillows;in use --    Skin Interventions   Pressure Reduction Devices --  pressure-redistributing mattress utilized     Intervention: Prevent or Minimize Pressure   09/05/18 0932   Positioning   Body Position sitting up in bed   Skin Interventions   Pressure Reduction Techniques frequent weight shift encouraged;weight shift assistance provided       Goal: Skin Health and Integrity  Outcome: Ongoing (interventions implemented as appropriate)   09/05/18 1647   Skin Injury Risk (Adult)   Skin Health and Integrity making progress toward outcome

## 2018-09-05 NOTE — PLAN OF CARE
Problem: Patient Care Overview  Goal: Plan of Care Review  Outcome: Ongoing (interventions implemented as appropriate)   09/05/18 0154 09/05/18 0315   Plan of Care Review   Progress --  no change   OTHER   Outcome Summary --  Pt had second IV placed due to pulling out prior shift, site wrapped and fluids running now. Staff was able to get patient to eat and drink some this shift. Staff heard gagging noises and witnessed patient with both hands in her mouth, made self throw up. Staff has been sitting outside patients door to monitor. Will continue to monitor this shift..   Coping/Psychosocial   Plan of Care Reviewed With patient --    Coping/Psychosocial   Patient Agreement with Plan of Care unable to participate --        Problem: Overarching Goals (Adult)  Goal: Adheres to Safety Considerations for Self and Others  Outcome: Ongoing (interventions implemented as appropriate)    Goal: Optimized Coping Skills in Response to Life Stressors  Outcome: Ongoing (interventions implemented as appropriate)    Goal: Develops/Participates in Therapeutic [place] to Support Successful Transition  Outcome: Ongoing (interventions implemented as appropriate)      Problem: Cognitive Impairment (Psychotic Signs/Symptoms) (Adult)  Goal: Improved Thought Clarity/Organization (Psychotic Signs/Symptoms)  Outcome: Ongoing (interventions implemented as appropriate)      Problem: Psychomotor Movement Impairment (Psychotic Signs/Symptoms) (Adult)  Goal: Improved Psychomotor Symptoms (Psychotic Signs/Symptoms)  Outcome: Ongoing (interventions implemented as appropriate)      Problem: Mental State/Mood Impairment (Psychotic Signs/Symptoms) (Adult)  Goal: Improved Mental State/Mood (Psychotic Signs/Symptoms)  Outcome: Ongoing (interventions implemented as appropriate)      Problem: Self-expression Impairment (Psychotic Signs/Symptoms) (Adult)  Goal: Improved Self-Expression (Psychotic Signs/Symptoms)  Outcome: Ongoing (interventions implemented  as appropriate)      Problem: Sleep Impairment (Psychotic Signs/Symptoms) (Adult)  Goal: Improved Sleep Hygiene (Psychotic Signs/Symptoms)  Outcome: Ongoing (interventions implemented as appropriate)      Problem: Fall Risk (Adult)  Goal: Absence of Fall  Outcome: Ongoing (interventions implemented as appropriate)      Problem: Skin Injury Risk (Adult)  Goal: Skin Health and Integrity  Outcome: Ongoing (interventions implemented as appropriate)

## 2018-09-05 NOTE — PROGRESS NOTES
"Risk had informed us that the patient's healthcare surrogate could agree to electroconvulsive therapy for the patient if the patient was able to give consent.  However, during today's interview, the patient is awake and more conversant and vehemently refuses ECT, specifically stating \"shock treatments?  No!\"  I have informed the patient's daughter of this, and she is accepting.  At least at this point we are seeing some improvement as the patient is more conversant and does not seem to be responding to the internal stimuli which previously been the case over the past few days.  "

## 2018-09-05 NOTE — PLAN OF CARE
Problem: Patient Care Overview  Goal: Plan of Care Review   09/04/18 1953   Plan of Care Review   Progress declining   OTHER   Outcome Summary PT became less drowsy as morning progressed. PT tolerated drinking 120 cc of OJ. at 1000 hrs. and progressed to drinking supplement and other fluids at lunch time. Limited verbalization by PT. 1415 hrs PT vomited approximately 100 cc of tan liquid. PT sat up at 90 degrees in chair. No distress noted. PT remains awake. PT vomited to floor at 1545 hrs, large amount tan fluid. No distress noted, returned to room for cleanup. Spoke with Dr. Silveira requesting IV fluids since PT was not tolerating PO. Meds withheld this shift. Hep lock placed, however, PT pulled out hep lock before start of IV fluids. Will continue to monitor PT's condition and provide support.   Coping/Psychosocial   Plan of Care Reviewed With other (see comments)   Coping/Psychosocial   Patient Agreement with Plan of Care unable to participate     Goal: Individualization and Mutuality  Outcome: Ongoing (interventions implemented as appropriate)   09/04/18 1953   Personal Strengths/Vulnerabilities   Patient Personal Strengths family/social support     Goal: Discharge Needs Assessment  Outcome: Ongoing (interventions implemented as appropriate)    Goal: Interprofessional Rounds/Family Conf  Outcome: Ongoing (interventions implemented as appropriate)      Problem: Overarching Goals (Adult)  Goal: Adheres to Safety Considerations for Self and Others  Outcome: Ongoing (interventions implemented as appropriate)    Goal: Optimized Coping Skills in Response to Life Stressors  Outcome: Ongoing (interventions implemented as appropriate)    Goal: Develops/Participates in Therapeutic [place] to Support Successful Transition  Outcome: Ongoing (interventions implemented as appropriate)

## 2018-09-05 NOTE — SIGNIFICANT NOTE
09/05/18 1011   Rehab Treatment   Discipline physical therapist   Reason Treatment Not Performed unavailable for treatment  (Pt eating breakfast with staff assist. Staff requests PT follow up later. )   Recommendation   PT - Next Appointment 09/05/18

## 2018-09-06 PROBLEM — K56.41 FECAL IMPACTION (HCC): Status: ACTIVE | Noted: 2018-01-01

## 2018-09-06 PROBLEM — N39.0 UTI (URINARY TRACT INFECTION): Status: ACTIVE | Noted: 2018-01-01

## 2018-09-06 NOTE — PROGRESS NOTES
Subjective         History of Present Illness  Low plat likely lab error, back to high count today, likely uti and retention.    Past Medical History, Past Surgical History, Social History, Family History have been reviewed and are without significant changes except as mentioned.    Review of Systems discussed with nurse about her situation, pt is not verbal      Medications:  The current medication list was reviewed in the EMR    ALLERGIES:  No Known Allergies    Objective      Vitals:    09/05/18 1341 09/05/18 1530 09/06/18 1004 09/06/18 1520   BP: 125/74 147/83 138/94 150/79   BP Location: Right arm Right arm Right arm Right arm   Patient Position: Lying Lying Lying Lying   Pulse: 88 102 109 107   Resp: 18 12 16 16   Temp:  97.4 °F (36.3 °C) 97.7 °F (36.5 °C) 98.7 °F (37.1 °C)   TempSrc:  Oral Oral Oral   SpO2: 96% 97% 95% 93%     No flowsheet data found.    Physical Exam  Distended abdomen, mild diffuse pain, vomites, hiccups, no jaundice. Awake barely responsive to verbal command.  RECENT LABS:  Hematology WBC   Date Value Ref Range Status   09/06/2018 14.28 (H) 4.50 - 10.70 10*3/mm3 Final     RBC   Date Value Ref Range Status   09/06/2018 4.79 3.90 - 5.20 10*6/mm3 Final     Hemoglobin   Date Value Ref Range Status   09/06/2018 13.5 11.9 - 15.5 g/dL Final     Hematocrit   Date Value Ref Range Status   09/06/2018 42.6 35.6 - 45.5 % Final     Platelets   Date Value Ref Range Status   09/06/2018 569 (H) 140 - 500 10*3/mm3 Final      Fibrinogen   Order: [id number]   Status:  Final result   Visible to patient:  No (Not Released)    Ref Range & Units 04:09   Fibrinogen 219 - 464 mg/dL 606     Resulting Agency   [facility]      Specimen Collected: 09/05/18 04:09 Last Resulted: 09/05/18 04:58            Immature Platelet Fraction   Order: [id number]   Status:  Final result   Visible to patient:  No (Not Released)    Ref Range & Units 04:09   IPF 0.90 - 6.50 % 1.90    Platelets 140 - 500 10*3/mm3 666     Resulting Agency   [facility]      Specimen Collected: 09/05/18 04:09 Last Resulted: 09/05/18 05:02              Folate   Order: [id number]   Status:  Final result   Visible to patient:  No (Not Released)    Ref Range & Units 04:09   Folate 4.78 - 24.20 ng/mL >20.00    Resulting Agency  [facility]      Specimen Collected: 09/05/18 04:09 Last Resulted: 09/05/18              Vitamin B12   Order: [id number]   Status:  Final result   Visible to patient:  No (Not Released)    Ref Range & Units 04:09   Vitamin B-12 211 - 946 pg/mL >2,000     Resulting Agency  [facility]      Specimen Collected: 09/05/18 04:09 Last Resulted: 09/05/18 05:36                    Assessment/Plan  This patient’s platelet count has recovered by miracle today making me to presume that the low platelet count yesterday was a lab mistake or computer mistake in regard to the translation of the numbers. Nobody has a platelet count of 66,000 one day and the next number is 666,000, again, making me to believe that this was a mistake. Under the present circumstances, I find nothing else to do for her platelet count but I do believe that the persistency of nausea and vomiting in this patient along with her abdominal distention and hiccups makes me to believe that she has an intraabdominal pathology. Obviously, it raised the question about gallbladder problems, appendix, diverticulum and so forth and I think a CT scan needs to be done. This has been scheduled already by Dr. Gallardo. The patient’s urinalysis is abnormal and she probably will require therapy for a possible urinary tract infection.     From my point of view, I have nothing else to add besides the followup on her tomorrow.     I will discuss the numbers with the lab management tomorrow in regard to the correction of what we got yesterday.    It was documented that the patient had a fecal impaction in the CT scan of the abdomen and pelvis done yesterday. No other abnormalities were found in her abdominal cavity. Her  platelet count has remained steady. Therefore, from my point of view, I find no need for any other hematological workup and we will just proceed with signing off on her case. The primary team and Psychiatry will continue taking care of her. It will be fine for the patient once that she gets improved and gets better to resume anticoagulation. Her IVC filter is still in place and this will be protective to her in regard to further blood clots into her lungs.                           9/6/2018      CC:

## 2018-09-06 NOTE — PLAN OF CARE
Problem: Patient Care Overview  Goal: Plan of Care Review   09/05/18 2330 09/06/18 0355   Plan of Care Review   Progress --  no change   OTHER   Outcome Summary --  Pt remains very confused and lethargic. Abdominal CT showed a large fecal impaction, however nothing else remarkable or changed from the last CT. This RN attempted to remove impaction. Large amount of feces was removed. Pt did vomit x1 this shift. It is unknown whether the patient is inducing this herself, as she has been seen multiple times sticking her fingers deep in her mouth. IV rocephin was administered per MAR. Pt tolerated well. Dextrose 5% continues to run at 75 mL/hr. New 1 Liter bag hung at 0000. Will continue to monitor.    Coping/Psychosocial   Plan of Care Reviewed With patient --    Coping/Psychosocial   Patient Agreement with Plan of Care unable to participate --        Problem: Overarching Goals (Adult)  Goal: Adheres to Safety Considerations for Self and Others  Outcome: Ongoing (interventions implemented as appropriate)   09/06/18 0533   Overarching Goals (Adult)   Adheres to Safety Considerations for Self and Others making progress toward outcome     Intervention: Develop and Maintain Individualized Safety Plan   09/05/18 2330 09/06/18 0400   Violence Risk   Feels Like Hurting Others no --    Previous Attempt to Harm Others no --    C-SSRS (Recent)   Wish to be Dead no --    Suicidal Thoughts no --    Suicide Behavior no --    Develop and Maintain Individualized Safety Plan   Safety Measures --  safety rounds completed       Goal: Optimized Coping Skills in Response to Life Stressors  Outcome: Ongoing (interventions implemented as appropriate)   09/06/18 0533   Overarching Goals (Adult)   Optimized Coping Skills in Response to Life Stressors making progress toward outcome     Intervention: Promote Effective Coping Strategies   09/05/18 2330   Coping/Psychosocial Interventions   Supportive Measures active listening  utilized;verbalization of feelings encouraged       Goal: Develops/Participates in Therapeutic [place] to Support Successful Transition  Outcome: Ongoing (interventions implemented as appropriate)   09/06/18 0533   Overarching Goals (Adult)   Develops/Participates in Therapeutic [place] to Support Successful Transition making progress toward outcome     Intervention: Foster Therapeutic [place]   09/05/18 2330   Interventions   Trust Relationship/Rapport care explained;thoughts/feelings acknowledged     Intervention: Mutually Develop Transition Plan   09/05/18 2330   Mutually Develop Transition Plan   Transition Support crisis management plan verbalized         Problem: Psychomotor Movement Impairment (Psychotic Signs/Symptoms) (Adult)  Goal: Improved Psychomotor Symptoms (Psychotic Signs/Symptoms)  Outcome: Ongoing (interventions implemented as appropriate)   09/04/18 1024 09/06/18 0355   Improved Psychomotor Symptoms (Psychotic Signs/Symptoms)   Improved Psychomotor Symptoms Action Step/Short Term Goal (STG) Established 09/02/18 --    Improved Psychomotor Symptoms Time Frame for Action Step (STG) --  1 day   Improved Psychomotor Symptoms Action Step (STG) Outcome --  making progress toward outcome       Problem: Self-expression Impairment (Psychotic Signs/Symptoms) (Adult)  Goal: Improved Self-Expression (Psychotic Signs/Symptoms)   09/04/18 1024 09/06/18 0355   Improved Self-Expression (Psychotic Signs/Symptoms)   Improved Self-Expression Action Step/Short Term Goal (STG) Established 09/02/18 --    Improved Self-Expression Time Frame for Action Step (STG) --  1 day   Improved Self-Expression Action Step (STG) Outcome --  making progress toward outcome       Problem: Sleep Impairment (Psychotic Signs/Symptoms) (Adult)  Goal: Improved Sleep Hygiene (Psychotic Signs/Symptoms)  Outcome: Ongoing (interventions implemented as appropriate)   09/06/18 0533   Improved Sleep Hygiene (Psychotic Signs/Symptoms)   Improved  Sleep Hygiene Time Frame for Action Step (STG) 1 day   Improved Sleep Hygiene Action Step (STG) Outcome making progress toward outcome       Problem: Fall Risk (Adult)  Goal: Absence of Fall  Outcome: Ongoing (interventions implemented as appropriate)   09/06/18 0533   Fall Risk (Adult)   Absence of Fall making progress toward outcome       Problem: Skin Injury Risk (Adult)  Goal: Skin Health and Integrity  Outcome: Ongoing (interventions implemented as appropriate)   09/06/18 0533   Skin Injury Risk (Adult)   Skin Health and Integrity unable to achieve outcome

## 2018-09-06 NOTE — PLAN OF CARE
Problem: Patient Care Overview  Goal: Plan of Care Review  Outcome: Ongoing (interventions implemented as appropriate)   09/06/18 1012 09/06/18 1627   Plan of Care Review   Progress --  no change   OTHER   Outcome Summary --  Pt remains confused but appeared a little bit brighter upon assessment in morning. Pt had one episode of vomiting in early morning. Pt was oriented to self and place, and denied pain. PRN zofran given at 1005 for nausea. Pt compliant with medications crushed in pudding. IV remains in place and infusing in L forearm. Fall precautions remain in place. Will continue to monitor and provide support.   Coping/Psychosocial   Plan of Care Reviewed With patient --    Coping/Psychosocial   Patient Agreement with Plan of Care unable to participate --        Problem: Overarching Goals (Adult)  Goal: Adheres to Safety Considerations for Self and Others  Outcome: Ongoing (interventions implemented as appropriate)   09/06/18 1627   Overarching Goals (Adult)   Adheres to Safety Considerations for Self and Others making progress toward outcome     Intervention: Develop and Maintain Individualized Safety Plan   09/06/18 1012 09/06/18 1600   Violence Risk   Feels Like Hurting Others no --    Previous Attempt to Harm Others no --    C-SSRS (Recent)   Wish to be Dead no --    Suicidal Thoughts no --    Suicide Behavior no --    Develop and Maintain Individualized Safety Plan   Safety Measures --  safety rounds completed       Goal: Optimized Coping Skills in Response to Life Stressors  Outcome: Ongoing (interventions implemented as appropriate)   09/06/18 1627   Overarching Goals (Adult)   Optimized Coping Skills in Response to Life Stressors making progress toward outcome     Intervention: Promote Effective Coping Strategies   09/06/18 1012   Coping/Psychosocial Interventions   Supportive Measures active listening utilized;verbalization of feelings encouraged;relaxation techniques promoted       Goal:  Develops/Participates in Therapeutic [place] to Support Successful Transition  Outcome: Ongoing (interventions implemented as appropriate)   09/06/18 1627   Overarching Goals (Adult)   Develops/Participates in Therapeutic [place] to Support Successful Transition making progress toward outcome     Intervention: Foster Therapeutic [place]   09/06/18 1012   Interventions   Trust Relationship/Rapport care explained;choices provided;emotional support provided;empathic listening provided;questions answered;questions encouraged;reassurance provided;thoughts/feelings acknowledged     Intervention: Mutually Develop Transition Plan   09/06/18 1012   Mutually Develop Transition Plan   Transition Support crisis management plan promoted         Problem: Cognitive Impairment (Psychotic Signs/Symptoms) (Adult)  Intervention: Promote Thought Clarity and Organization   08/22/18 1546   Promote Thought Clarity and Organization   Mutually Determined Action Steps (Promote Thought Clarity/Organization) remains focused during activity       Goal: Improved Thought Clarity/Organization (Psychotic Signs/Symptoms)  Outcome: Ongoing (interventions implemented as appropriate)   09/04/18 1024 09/06/18 0355 09/06/18 1627   Improved Thought Clarity/Organization (Psychotic Signs/Symptoms)   Improved Thought Clarity/Organization Action Step/Short Term Goal (STG) Established 09/02/18 --  --    Improved Thought Clarity/Organization Time Frame for Action Step (STG) --  1 day --    Improved Thought Clarity/Organization Action Step (STG) Outcome --  --  making progress toward outcome       Problem: Psychomotor Movement Impairment (Psychotic Signs/Symptoms) (Adult)  Intervention: Manage Psychomotor Movement   08/22/18 1546   Manage Psychomotor Movement   Mutually Determined Action Steps (Manage Psychomotor Movement) exhibits decrease in agitation;adheres to medication regimen       Goal: Improved Psychomotor Symptoms (Psychotic  Signs/Symptoms)  Outcome: Ongoing (interventions implemented as appropriate)   09/04/18 1024 09/06/18 0355 09/06/18 1627   Improved Psychomotor Symptoms (Psychotic Signs/Symptoms)   Improved Psychomotor Symptoms Action Step/Short Term Goal (STG) Established 09/02/18 --  --    Improved Psychomotor Symptoms Time Frame for Action Step (STG) --  1 day --    Improved Psychomotor Symptoms Action Step (STG) Outcome --  --  making progress toward outcome       Problem: Mental State/Mood Impairment (Psychotic Signs/Symptoms) (Adult)  Intervention: Optimize Mental State and Mood   08/22/18 1546   Optimize Mental State and Mood   Mutually Determined Action Steps (Optimize Mental State/Mood) verbalizes increased insight       Goal: Improved Mental State/Mood (Psychotic Signs/Symptoms)  Outcome: Ongoing (interventions implemented as appropriate)   09/04/18 1024 09/06/18 0355 09/06/18 1627   Improved Mental State/Mood (Psychotic Signs/Symptoms)   Improved Mental State/Mood Action Step/Short Term Goal (STG) Established 09/02/18 --  --    Improved Mental State/Mood Time Frame for Action Step (STG) --  1 day --    Improved Mental State/Mood Action Step (STG) Outcome --  --  making progress toward outcome       Problem: Self-expression Impairment (Psychotic Signs/Symptoms) (Adult)  Intervention: Encourage Positive Self-Expression   08/22/18 1546   Encourage Positive Self-Expression   Mutually Determined Action Steps (Encourage Positive Self-Expression) communicates needs verbally       Goal: Improved Self-Expression (Psychotic Signs/Symptoms)  Outcome: Ongoing (interventions implemented as appropriate)   09/04/18 1024 09/06/18 0355 09/06/18 1627   Improved Self-Expression (Psychotic Signs/Symptoms)   Improved Self-Expression Action Step/Short Term Goal (STG) Established 09/02/18 --  --    Improved Self-Expression Time Frame for Action Step (STG) --  1 day --    Improved Self-Expression Action Step (STG) Outcome --  --  making  progress toward outcome       Problem: Sleep Impairment (Psychotic Signs/Symptoms) (Adult)  Intervention: Promote Healthy Sleep Hygiene   18 1647   Promote Healthy Sleep Hygiene   Mutually Determined Action Steps (Promote Healthy Sleep Hygiene) sleeps 4-6 hours at night       Goal: Improved Sleep Hygiene (Psychotic Signs/Symptoms)  Outcome: Ongoing (interventions implemented as appropriate)   18 1024 18 0533 18 1627   Improved Sleep Hygiene (Psychotic Signs/Symptoms)   Improved Sleep Hygiene Action Step/Short Term Goal (STG) Established 18 --  --    Improved Sleep Hygiene Time Frame for Action Step (STG) --  1 day --    Improved Sleep Hygiene Action Step (STG) Outcome --  --  making progress toward outcome       Problem: Fall Risk (Adult)  Intervention: Monitor/Assist with Self Care   18 1012   Activity   Activity Assistance Provided assistance, 1 person   Daily Care Interventions   Self-Care Promotion independence encouraged;meal setup provided;BADL personal objects within reach     Intervention: Reduce Risk/Promote Restraint Free Environment   18 1600 18 162   Prevent  Drop/Fall   Safety/Security Measures --  bed alarm set   Safety Management   Environmental Safety Modification assistive device/personal items within reach;clutter free environment maintained;room organization consistent --    Safety Promotion/Fall Prevention nonskid shoes/slippers when out of bed;safety round/check completed;fall prevention program maintained --      Intervention: Review Medications/Identify Contributors to Fall Risk   18 1600   Safety Management   Medication Review/Management medications reviewed       Goal: Absence of Fall  Outcome: Ongoing (interventions implemented as appropriate)   18 1627   Fall Risk (Adult)   Absence of Fall making progress toward outcome       Problem: Skin Injury Risk (Adult)  Intervention: Promote/Optimize Nutrition   18 1012    Nutrition Interventions   Oral Nutrition Promotion medicated;rest periods promoted;calorie dense liquids provided     Intervention: Prevent/Manage Excess Moisture   09/06/18 1012 09/06/18 1627   Hygiene Care   Perineal Care --  diaper changed;perineum cleansed   Skin Interventions   Skin Protection incontinence pads utilized --      Intervention: Prevent/Minimize Shear/Friction Injuries   09/06/18 1012   Skin Interventions   Pressure Reduction Devices pressure-redistributing mattress utilized     Intervention: Prevent or Minimize Pressure   09/06/18 1012   Positioning   Body Position sitting up in bed   Skin Interventions   Pressure Reduction Techniques frequent weight shift encouraged;weight shift assistance provided       Goal: Skin Health and Integrity  Outcome: Ongoing (interventions implemented as appropriate)   09/06/18 1627   Skin Injury Risk (Adult)   Skin Health and Integrity making progress toward outcome

## 2018-09-06 NOTE — PROGRESS NOTES
The patient is less verbal today.  She is now being treated for urinary tract infection.  Her by mouth intake remains poor.

## 2018-09-06 NOTE — PROGRESS NOTES
DAILY PROGRESS NOTE  [facility]    Patient Identification:  Name: Britany Pruitt  Age: 88 y.o.  Sex: female  :  1930  MRN: [id number]         Primary Care Physician: Jerome Moore Jr., MD    Subjective:  Interval History:Not talking today. Less Sedated. Poor PO intake. Vomited yesterday.     Objective:    Scheduled Meds:    acetaminophen 1,300 mg Oral Daily   ARIPiprazole 5 mg Oral Nightly   bisacodyl 10 mg Rectal Daily   buPROPion 75 mg Oral TID   ceftriaxone 1 g Intravenous Q24H   furosemide 20 mg Oral Daily   multivitamin 1 tablet Oral Daily   polyethylene glycol 17 g Oral Daily   sennosides-docusate sodium 2 tablet Oral Nightly     Continuous Infusions:    dextrose 75 mL/hr Last Rate: 75 mL/hr (18 1101)       Vital signs in last 24 hours:  Temp:  [97.4 °F (36.3 °C)-97.7 °F (36.5 °C)] 97.7 °F (36.5 °C)  Heart Rate:  [] 109  Resp:  [12-18] 16  BP: (125-147)/(74-94) 138/94    Intake/Output:    Intake/Output Summary (Last 24 hours) at 18 1104  Last data filed at 18 1640   Gross per 24 hour   Intake                0 ml   Output              625 ml   Net             -625 ml       Exam:  /94 (BP Location: Right arm, Patient Position: Lying)   Pulse 109   Temp 97.7 °F (36.5 °C) (Oral)   Resp 16   SpO2 95%     General Appearance:    Not talking, no distress   Head:    Normocephalic, without obvious abnormality, atraumatic   Eyes:       Throat:   Lips, tongue, gums normal   Neck:   Supple, symmetrical, trachea midline, no JVD   Lungs:     Clear to auscultation bilaterally, respirations unlabored   Chest Wall:    No tenderness or deformity    Heart:    Regular rate and rhythm, S1 and S2 normal, no murmur,no  Rub or gallop   Abdomen:     Soft, non-tender, bowel sounds active, no masses, no organomegaly    Extremities:   Extremities normal, atraumatic, no cyanosis or edema   Pulses:      Skin:   Skin is warm and dry,  no rashes or palpable lesions   Neurologic:    no focal deficits noted, demented and not talking.      Lab Results (last 72 hours)     Procedure Component Value Units Date/Time    Protime-INR [[id number]]  (Abnormal) Collected:  09/03/18 0735    Specimen:  Blood Updated:  09/03/18 0839     Protime 28.8 (H) Seconds      INR 2.77 (H)    Protime-INR [[id number]]  (Abnormal) Collected:  09/02/18 1011    Specimen:  Blood Updated:  09/02/18 1038     Protime 23.6 (H) Seconds      INR 2.14 (H)    POC Glucose Once [[id number]]  (Normal) Collected:  09/02/18 1016    Specimen:  Blood Updated:  09/02/18 1017     Glucose 107 mg/dL     Narrative:       Meter: [id number] : [id number] Emil Stein RN    Protime-INR [[id number]]  (Abnormal) Collected:  09/01/18 0810    Specimen:  Blood Updated:  09/01/18 0910     Protime 22.6 (H) Seconds      INR 2.03 (H)        Data Review:    Results from last 7 days  Lab Units 09/06/18  0800 09/05/18  0409 09/04/18  0646   SODIUM mmol/L 136 146* 147*   POTASSIUM mmol/L 3.3* 3.3* 4.6   CHLORIDE mmol/L 93* 102 103   CO2 mmol/L 28.2 26.5 18.1*   BUN mg/dL 34* 47* 39*   CREATININE mg/dL 0.49* 0.69 0.88   GLUCOSE mg/dL 167* 206* 127*   CALCIUM mg/dL 9.8 10.3 9.8       Results from last 7 days  Lab Units 09/06/18  0848 09/05/18  0409 09/04/18  0635   WBC 10*3/mm3 14.28* 8.98 9.75   HEMOGLOBIN g/dL 13.5 13.5 15.2   HEMATOCRIT % 42.6 42.9 46.9*   PLATELETS 10*3/mm3 569* 666*  666* 66*       Results from last 7 days  Lab Units 09/05/18  0409   TSH mIU/mL 0.029*   T3 FREE pg/mL 3.53   FREE T4 ng/dL 1.52           Lab Results  Lab Value Date/Time   TROPONINT <0.010 08/13/2018 1209               Invalid input(s): PROT, LABALBU    Results from last 7 days  Lab Units 09/05/18  0409   TSH mIU/mL 0.029*   T3 FREE pg/mL 3.53   FREE T4 ng/dL 1.52         No results found for: POCGLU    Results from last 7 days  Lab Units 09/06/18  0800 09/05/18  0409 09/04/18  0635   INR  2.02* 3.53* 2.99*       Patient Active Problem List   Diagnosis Code   • Lumbar  compression fracture (CMS/[facility]) S32.000A   • Closed nondisplaced intertrochanteric fracture of left femur (CMS/[facility]) S72.145A   • History of DVT (deep vein thrombosis) Z86.718   • Chronic anticoagulation Z79.01   • Opiates and related narcotics causing adverse effect T40.605A   • Fall W19.XXXA   • Dementia with behavioral disturbance F03.91   • Depression F32.9   • Arthritis of spine (CMS/[facility]) M46.90   • UTI (urinary tract infection) N39.0   • Fecal impaction (CMS/[facility]) K56.41       Assessment:  Active Hospital Problems    Diagnosis Date Noted   • **Dementia with behavioral disturbance [F03.91] 08/13/2018   • UTI (urinary tract infection) [N39.0] 09/06/2018   • Fecal impaction (CMS/[facility]) [K56.41] 09/06/2018   • Depression [F32.9] 08/13/2018   • Arthritis of spine (CMS/[facility]) [M46.90] 08/13/2018   • History of DVT (deep vein thrombosis) [Z86.718] 03/22/2017   • Chronic anticoagulation [Z79.01] 03/22/2017      Resolved Hospital Problems    Diagnosis Date Noted Date Resolved   No resolved problems to display.       Plan:  Will check  more labs tomorrow. Will follow.  Continue some IV fluids if PO intake not improving. Discussed with nurse to encourage more fluid intake and try to get her to eat.  Continue antibiotics and await  Culture.  Dylan Gallardo MD  9/6/2018  11:04 AM

## 2018-09-07 NOTE — PROGRESS NOTES
TRAM doppler completed.  Preliminary results:  Negative for DVT and STP in bilateral upper extremities.  Results given to TONY Lemus.

## 2018-09-07 NOTE — PROGRESS NOTES
Pharmacokinetic Evaluation - Vancomycin    Britany Pruitt is a 88 y.o. female on vancomycin pharmacy to dose.  MRN: [id number]  : 1930    Day of vancomycin therapy: 1  Indication: UTI  Consulted by: Dr. Gallardo  Goal trough: 10 - 20 mcg/mL    Blood pressure 128/77, pulse 105, temperature 97.7 °F (36.5 °C), temperature source Oral, resp. rate 16, SpO2 96 %.    Results from last 7 days  Lab Units 18  0758 18  0800 18  0409   CREATININE mg/dL 0.53* 0.49* 0.69     Estimated Creatinine Clearance: 41.8 mL/min (A) (by C-G formula based on SCr of 0.53 mg/dL (L)).    Results from last 7 days  Lab Units 18  0758 18  0848 18  0409   WBC 10*3/mm3 13.04* 14.28* 8.98   HEMOGLOBIN g/dL 15.0 13.5 13.5   HEMATOCRIT % 44.9 42.6 42.9   PLATELETS 10*3/mm3 632* 569* 666*  666*     Cultures:    urine cx - gram positive cocci - preliminary report     Assessment:  Renal function is stable and at baseline.    Plan:  1) Will give vancomycin loading dose of 1000 mg (~16.2 mg/kg) IV x1  2) Start vancomycin 1000 mg every 24 hours.  3) Trough to be drawn on 9/10 at 1430 after 3 total doses.  4) Pharmacy will continue to monitor and adjust regimen as needed.    Dulce Leon, [facility]

## 2018-09-07 NOTE — PROGRESS NOTES
The patient's medical issues continue to be confounding.  Her IV is infiltrated and has been discontinued.  She is also had some vomiting and so her by mouth intake has been reduced.  While somewhat lethargic this morning, the patient's mood seems a bit brighter if she does smile and attempted to answer a couple of questions today.  We'll continue to closely monitor the patient's medical situation.

## 2018-09-07 NOTE — PLAN OF CARE
Problem: Patient Care Overview  Goal: Plan of Care Review  Outcome: Ongoing (interventions implemented as appropriate)   09/07/18 0048 09/07/18 0357   Plan of Care Review   Progress --  no change   Coping/Psychosocial   Plan of Care Reviewed With patient --    Coping/Psychosocial   Patient Agreement with Plan of Care unable to participate --       09/07/18 0048 09/07/18 0357   Plan of Care Review   Progress --  no change   OTHER   Outcome Summary --  Pt has been confused and lethargic this shift. Pt woke briefly when IV nurse came to start a new IV. Pt is resting in bed with continuous fluids running. Will continue to monitor.   Coping/Psychosocial   Plan of Care Reviewed With patient --    Coping/Psychosocial   Patient Agreement with Plan of Care unable to participate --        Problem: Overarching Goals (Adult)  Goal: Adheres to Safety Considerations for Self and Others  Outcome: Ongoing (interventions implemented as appropriate)    Goal: Optimized Coping Skills in Response to Life Stressors  Outcome: Ongoing (interventions implemented as appropriate)    Goal: Develops/Participates in Therapeutic [place] to Support Successful Transition  Outcome: Ongoing (interventions implemented as appropriate)      Problem: Cognitive Impairment (Psychotic Signs/Symptoms) (Adult)  Goal: Improved Thought Clarity/Organization (Psychotic Signs/Symptoms)  Outcome: Ongoing (interventions implemented as appropriate)      Problem: Psychomotor Movement Impairment (Psychotic Signs/Symptoms) (Adult)  Goal: Improved Psychomotor Symptoms (Psychotic Signs/Symptoms)  Outcome: Ongoing (interventions implemented as appropriate)      Problem: Mental State/Mood Impairment (Psychotic Signs/Symptoms) (Adult)  Goal: Improved Mental State/Mood (Psychotic Signs/Symptoms)  Outcome: Ongoing (interventions implemented as appropriate)      Problem: Self-expression Impairment (Psychotic Signs/Symptoms) (Adult)  Goal: Improved Self-Expression (Psychotic  Signs/Symptoms)  Outcome: Ongoing (interventions implemented as appropriate)      Problem: Sleep Impairment (Psychotic Signs/Symptoms) (Adult)  Goal: Improved Sleep Hygiene (Psychotic Signs/Symptoms)  Outcome: Ongoing (interventions implemented as appropriate)      Problem: Fall Risk (Adult)  Goal: Absence of Fall  Outcome: Ongoing (interventions implemented as appropriate)      Problem: Skin Injury Risk (Adult)  Goal: Skin Health and Integrity  Outcome: Ongoing (interventions implemented as appropriate)

## 2018-09-07 NOTE — THERAPY TREATMENT NOTE
Acute Care - Physical Therapy Treatment Note  [facility]     Patient Name: Britany Pruitt  : 1930  MRN: [id number]  Today's Date: 2018     Date of Referral to PT: 08/15/18  Referring Physician: Dr Garcia    Admit Date: 2018    Visit Dx:    ICD-10-CM ICD-9-CM   1. Dementia with behavioral disturbance, unspecified dementia type F03.91 294.21   2. Unsteady gait R26.81 781.2     Patient Active Problem List   Diagnosis   • Lumbar compression fracture (CMS/HCC)   • Closed nondisplaced intertrochanteric fracture of left femur (CMS/HCC)   • History of DVT (deep vein thrombosis)   • Chronic anticoagulation   • Opiates and related narcotics causing adverse effect   • Fall   • Dementia with behavioral disturbance   • Depression   • Arthritis of spine (CMS/HCC)   • UTI (urinary tract infection)   • Fecal impaction (CMS/HCC)       Therapy Treatment          Rehabilitation Treatment Summary     Row Name 18 1059             Treatment Time/Intention    Discipline physical therapist  -LB      Document Type therapy note (daily note)  -LB      Subjective Information complains of;weakness  -LB      Mode of Treatment physical therapy  -LB      Patient/Family Observations pt in bed, difficult to understand but cooperative  -LB      Therapy Frequency (PT Clinical Impression) 5 times/wk  -LB      Patient Effort adequate  -LB      Existing Precautions/Restrictions fall  -LB      Recorded by [LB] Hannah Dacosta, PT 18 1137      Row Name 18 1059             Cognitive Assessment/Intervention- PT/OT    Follows Commands (Cognition) follows one step commands;25-49% accuracy;delayed response/completion  -LB      Safety Deficit (Cognitive) moderate deficit;problem solving  -LB      Personal Safety Interventions fall prevention program maintained;gait belt;muscle strengthening facilitated;nonskid shoes/slippers when out of bed  -LB      Recorded by [LB] Hannah Dacosta, PT 18 1137      Row Name 18  1059             Safety Issues, Functional Mobility    Safety Issues Affecting Function (Mobility) ability to follow commands;insight into deficits/self awareness  -LB      Impairments Affecting Function (Mobility) balance;cognition;coordination;endurance/activity tolerance;strength  -LB      Recorded by [LB] Hannah Dacosta, PT 09/07/18 1137      Row Name 09/07/18 1059             Bed Mobility Assessment/Treatment    Supine-Sit [place] (Bed Mobility) moderate assist (50% patient effort);maximum assist (25% patient effort)  -LB      Sit-Supine [place] (Bed Mobility) moderate assist (50% patient effort);maximum assist (25% patient effort)  -LB      Bed Mobility, Safety Issues cognitive deficits limit understanding  -LB      Assistive Device (Bed Mobility) bed rails;head of bed elevated  -LB      Recorded by [LB] Hannah Dacosta, PT 09/07/18 1137      Row Name 09/07/18 1059             Sit-Stand Transfer    Sit-Stand [place] (Transfers) verbal cues;moderate assist (50% patient effort)  -LB      Assistive Device (Sit-Stand Transfers) walker, front-wheeled  -LB      Recorded by [LB] Hannah Dacosta, PT 09/07/18 1137      Row Name 09/07/18 1059             Stand-Sit Transfer    Stand-Sit [place] (Transfers) verbal cues;minimum assist (75% patient effort)  -LB      Assistive Device (Stand-Sit Transfers) walker, front-wheeled  -LB      Recorded by [LB] Hannah Dacosta, PT 09/07/18 1137      Row Name 09/07/18 1059             Gait/Stairs Assessment/Training    [place] Level (Gait) minimum assist (75% patient effort);moderate assist (50% patient effort)  -LB      Assistive Device (Gait) walker, front-wheeled  -LB      Distance in Feet (Gait) 12  -LB      Pattern (Gait) step-to  -LB      Deviations/Abnormal Patterns (Gait) vonnie decreased;stride length decreased;base of support, narrow  -LB      Bilateral Gait Deviations forward flexed posture;heel strike decreased  -LB      Comment (Gait/Stairs) chair  brought up for patient, low endurance  -LB      Recorded by [LB] Hannah Dacosta, PT 09/07/18 1137      Row Name 09/07/18 1059             Positioning and Restraints    Pre-Treatment Position in bed  -LB      Post Treatment Position wheelchair  -LB      In Wheelchair call light within reach;encouraged to call for assist  -LB      Recorded by [LB] Hannah Dacosta, PT 09/07/18 1137      Row Name 09/07/18 1059             Pain Scale: Numbers Pre/Post-Treatment    Pain Scale: Numbers, Pretreatment 0/10 - no pain  -LB      Pain Scale: Numbers, Post-Treatment 0/10 - no pain  -LB      Recorded by [LB] Hannah Dacosta, PT 09/07/18 1137        User Key  (r) = Recorded By, (t) = Taken By, (c) = Cosigned By    Initials Name Effective Dates Discipline    LB Hannah Dacosta, PT 04/03/18 -  PT                     Physical Therapy Education     Title: PT OT SLP Therapies (Active)     Topic: Physical Therapy (Active)     Point: Mobility training (Active)    Learning Progress Summary     Learner Status Readiness Method Response Comment Documented by    Patient Active Acceptance E,TB NR  LB 09/07/18 1137     Active Nonacceptance E NR  MG 09/05/18 1401     Done Acceptance E VU,NR   08/31/18 1051     Active Acceptance E,TB,D NR   08/30/18 1613     Active Acceptance E NR  JK 08/28/18 1236     Active Acceptance E,TB NR  LB 08/27/18 1029     Done Acceptance E,D VU,NR bed mobility LB1 08/24/18 1418     Active Acceptance E,D NR with bed mobility LB1 08/23/18 1018     Active Acceptance E NR  SK 08/22/18 2336     Active Acceptance E,TB,D NR   08/22/18 1223     Done Acceptance E,TB,D VU,DU,NR Pt need reinforcement.  08/21/18 1445     Active Acceptance E NR  LB2 08/20/18 1222     Active Acceptance E NR  LB1 08/17/18 1243     Active Acceptance E,D NR   08/16/18 1320     Done Acceptance E,D NR,DU  JK 08/15/18 1618    Family Done Acceptance E,TB,D VU,DU,NR Pt need reinforcement.  08/21/18 1445     Active Acceptance E,D NR   08/16/18 1375      Done Acceptance E,D NR,DU  JK 08/15/18 1618          Point: Home exercise program (Done)    Learning Progress Summary     Learner Status Readiness Method Response Comment Documented by    Patient Done Acceptance E VU,NR   08/31/18 1051     Active Acceptance E,TB,D NR   08/30/18 1613     Active Acceptance E NR  SK 08/22/18 2336     Done Acceptance E,TB,D VU,DU,NR Pt need reinforcement.  08/21/18 1445     Active Acceptance E NR  [facility] 08/17/18 1243    Family Done Acceptance E,TB,D VU,DU,NR Pt need reinforcement.  08/21/18 1445          Point: Body mechanics (Done)    Learning Progress Summary     Learner Status Readiness Method Response Comment Documented by    Patient Done Acceptance E VU,NR   08/31/18 1051     Active Acceptance E,TB,D NR   08/30/18 1613     Active Acceptance E NR  JK 08/28/18 1236     Active Acceptance E NR  SK 08/22/18 2336     Active Acceptance E NR  [facility] 08/17/18 1243          Point: Precautions (Done)    Learning Progress Summary     Learner Status Readiness Method Response Comment Documented by    Patient Done Acceptance E VU,NR   08/31/18 1051     Active Acceptance E,TB,D NR   08/30/18 1613     Active Acceptance E NR  [facility] 08/17/18 1243                      User Key     Initials Effective Dates Name Provider Type Discipline    [facility] 06/08/18 -  Carola Archer, PT Physical Therapist PT     06/08/18 -  Alla Cruz, PT Physical Therapist PT     04/03/18 -  Hannah Dacosta, PT Physical Therapist PT     04/03/18 -  Carola Pruitt, PT Physical Therapist PT    [facility] 03/07/18 -  Carola Green, PTA Physical Therapy Assistant PT     04/03/18 -  Mary Ann Machuca, PT Physical Therapist PT     04/03/18 -  Mary Austin, PT Physical Therapist PT    [facility] 05/15/17 -  Aracelis Belcher, RN Registered Nurse Nurse     04/03/18 -  Gosselin, Megan, PT Physical Therapist PT                    PT Recommendation and Plan  Therapy Frequency (PT Clinical Impression): 5 times/wk  Plan of Care  Reviewed With: patient  Progress: improving  Outcome Summary: In PT, the patient was able to ambulate slightly more than 2 days again but still with low endurance.  Pt was cooperative in therapy.            Outcome Measures     Row Name 09/07/18 1100 09/05/18 1400          How much help from another person do you currently need...    Turning from your back to your side while in flat bed without using bedrails? 3  -LB 3  -MG     Moving from lying on back to sitting on the side of a flat bed without bedrails? 2  -LB 2  -MG     Moving to and from a bed to a chair (including a wheelchair)? 2  -LB 2  -MG     Standing up from a chair using your arms (e.g., wheelchair, bedside chair)? 2  -LB 3  -MG     Climbing 3-5 steps with a railing? 2  -LB 2  -MG     To walk in hospital room? 3  -LB 3  -MG     AM-PAC 6 Clicks Score 14  -LB 15  -MG        Functional Assessment    Outcome Measure Options AM-PAC 6 Clicks Basic Mobility (PT)  -LB AM-PAC 6 Clicks Basic Mobility (PT)  -MG       User Key  (r) = Recorded By, (t) = Taken By, (c) = Cosigned By    Initials Name Provider Type    LB Hannah Dacosta, PT Physical Therapist    MG Gosselin, Megan, PT Physical Therapist           Time Calculation:         PT Charges     Row Name 09/07/18 1141             Time Calculation    Start Time 1059  -LB      Stop Time 1114  -LB      Time Calculation (min) 15 min  -LB      PT Received On 09/07/18  -LB      PT - Next Appointment 09/24/18  -LB         Time Calculation- PT    Total Timed Code Minutes- PT 12 minute(s)  -LB        User Key  (r) = Recorded By, (t) = Taken By, (c) = Cosigned By    Initials Name Provider Type    Hannah Christine, PT Physical Therapist        Therapy Suggested Charges     Code   Minutes Charges    None           Therapy Charges for Today     Code Description Service Date Service Provider Modifiers Qty    91013018867 HC PT THER PROC EA 15 MIN 9/7/2018 Hannah Dacosta, PT GP 1    91460397788 HC PT THER SUPP EA 15 MIN 9/7/2018  Hannah Dacosta, PT GP 1          PT G-Codes  Outcome Measure Options: AM-PAC 6 Clicks Basic Mobility (PT)  AM-PAC 6 Clicks Score: 14    Hannah Dacosta, PT  9/7/2018

## 2018-09-07 NOTE — PROGRESS NOTES
DAILY PROGRESS NOTE  [facility]    Patient Identification:  Name: Britany Pruitt  Age: 88 y.o.  Sex: female  :  1930  MRN: [id number]         Primary Care Physician: Jerome Moore Jr., MD    Subjective:  Interval History: Talking very little  today. Less Sedated. Poor PO intake.     Objective:    Scheduled Meds:    acetaminophen 1,300 mg Oral Daily   ARIPiprazole 5 mg Oral Nightly   bisacodyl 10 mg Rectal Daily   buPROPion 75 mg Oral TID   multivitamin 1 tablet Oral Daily   polyethylene glycol 17 g Oral Daily   sennosides-docusate sodium 2 tablet Oral Nightly   vancomycin 1,000 mg Intravenous Q24H     Continuous Infusions:    dextrose 5 % and sodium chloride 0.45 % with KCl 10 mEq/L 75 mL/hr Last Rate: Stopped (18 0430)   Pharmacy to dose vancomycin         Vital signs in last 24 hours:  Temp:  [97.7 °F (36.5 °C)] 97.7 °F (36.5 °C)  Heart Rate:  [105] 105  Resp:  [16] 16  BP: (128)/(77) 128/77    Intake/Output:    Intake/Output Summary (Last 24 hours) at 18 1632  Last data filed at 18 1834   Gross per 24 hour   Intake               50 ml   Output                0 ml   Net               50 ml       Exam:  /77 (BP Location: Left arm, Patient Position: Lying)   Pulse 105   Temp 97.7 °F (36.5 °C) (Oral)   Resp 16   SpO2 96%     General Appearance:    Not talking, no distress   Head:    Normocephalic, without obvious abnormality, atraumatic   Eyes:       Throat:   Lips, tongue, gums normal   Neck:   Supple, symmetrical, trachea midline, no JVD   Lungs:     Clear to auscultation bilaterally, respirations unlabored   Chest Wall:    No tenderness or deformity    Heart:    Regular rate and rhythm, S1 and S2 normal, no murmur,no  Rub or gallop   Abdomen:     Soft, non-tender, bowel sounds active, no masses, no organomegaly    Extremities:   Extremities normal, atraumatic, no cyanosis or edema   Pulses:      Skin:   Skin is warm and dry,  no rashes or palpable lesions    Neurologic:   no focal deficits noted, demented and not talking.      Lab Results (last 72 hours)     Procedure Component Value Units Date/Time    Protime-INR [[id number]]  (Abnormal) Collected:  09/03/18 0735    Specimen:  Blood Updated:  09/03/18 0839     Protime 28.8 (H) Seconds      INR 2.77 (H)    Protime-INR [[id number]]  (Abnormal) Collected:  09/02/18 1011    Specimen:  Blood Updated:  09/02/18 1038     Protime 23.6 (H) Seconds      INR 2.14 (H)    POC Glucose Once [[id number]]  (Normal) Collected:  09/02/18 1016    Specimen:  Blood Updated:  09/02/18 1017     Glucose 107 mg/dL     Narrative:       Meter: [id number] : [id number] Emil Stein RN    Protime-INR [[id number]]  (Abnormal) Collected:  09/01/18 0810    Specimen:  Blood Updated:  09/01/18 0910     Protime 22.6 (H) Seconds      INR 2.03 (H)        Data Review:    Results from last 7 days  Lab Units 09/07/18  0758 09/06/18  0800 09/05/18  0409   SODIUM mmol/L 138 136 146*   POTASSIUM mmol/L 3.3* 3.3* 3.3*   CHLORIDE mmol/L 93* 93* 102   CO2 mmol/L 33.6* 28.2 26.5   BUN mg/dL 30* 34* 47*   CREATININE mg/dL 0.53* 0.49* 0.69   GLUCOSE mg/dL 161* 167* 206*   CALCIUM mg/dL 9.9 9.8 10.3       Results from last 7 days  Lab Units 09/07/18  0758 09/06/18  0848 09/05/18  0409   WBC 10*3/mm3 13.04* 14.28* 8.98   HEMOGLOBIN g/dL 15.0 13.5 13.5   HEMATOCRIT % 44.9 42.6 42.9   PLATELETS 10*3/mm3 632* 569* 666*  666*       Results from last 7 days  Lab Units 09/05/18  0409   TSH mIU/mL 0.029*   T3 FREE pg/mL 3.53   FREE T4 ng/dL 1.52           Lab Results  Lab Value Date/Time   TROPONINT <0.010 08/13/2018 1209               Invalid input(s): PROT, LABALBU    Results from last 7 days  Lab Units 09/05/18  0409   TSH mIU/mL 0.029*   T3 FREE pg/mL 3.53   FREE T4 ng/dL 1.52         No results found for: POCGLU    Results from last 7 days  Lab Units 09/07/18  0758 09/06/18  0800 09/05/18  0409   INR  1.96* 2.02* 3.53*       Patient Active Problem List   Diagnosis  Code   • Lumbar compression fracture (CMS/[facility]) S32.000A   • Closed nondisplaced intertrochanteric fracture of left femur (CMS/[facility]) S72.145A   • History of DVT (deep vein thrombosis) Z86.718   • Chronic anticoagulation Z79.01   • Opiates and related narcotics causing adverse effect T40.605A   • Fall W19.XXXA   • Dementia with behavioral disturbance F03.91   • Depression F32.9   • 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.90   • UTI (urinary tract infection) N39.0   • Fecal impaction (CMS/[facility]) K56.41       Assessment:  Active Hospital Problems    Diagnosis Date Noted   • **Dementia with behavioral disturbance [F03.91] 08/13/2018   • UTI (urinary tract infection) [N39.0] 09/06/2018   • Fecal impaction (CMS/[facility]) [K56.41] 09/06/2018   • Depression [F32.9] 08/13/2018   • Arthritis of spine (CMS/[facility]) [M46.90] 08/13/2018   • History of DVT (deep vein thrombosis) [Z86.718] 03/22/2017   • Chronic anticoagulation [Z79.01] 03/22/2017      Resolved Hospital Problems    Diagnosis Date Noted Date Resolved   No resolved problems to display.       Plan:  Will check  more labs tomorrow. Will follow.  Continue some IV fluids if PO intake not improving. Discussed with nurse to encourage more fluid intake and try to get her to eat.  Continue antibiotics change to vancomycin and await  Culture.  Replace K.  RX for constipation    Raoul Gallardo MD  9/7/2018  4:32 PM

## 2018-09-07 NOTE — PLAN OF CARE
Problem: Patient Care Overview  Goal: Interprofessional Rounds/Family Conf  Outcome: Ongoing (interventions implemented as appropriate)   09/07/18 1026   Interdisciplinary Rounds/Family Conf   Summary Treatment team met to review plan of care. [facility] is continuing to follow the pt to ensure progress in treatment. Pt currently has a UTI that is being treated. SW will continue to collaborate with pt's family regarding expected discharge.    Interdisciplinary Rounds/Family Conf   Participants ;art therapy;psychiatrist;nursing;social work     Patient/Guardian Signature: __________________________________            Psychiatrist Signature: ______________________________________             Therapist Signature: ________________________________________         Nurse Signature: ___________________________________________          Staff Signature: ____________________________________________            Staff Signature: ____________________________________________          Staff Signature: ____________________________________________          Staff Signature:                                                                                                      Problem: Cognitive Impairment (Psychotic Signs/Symptoms) (Adult)  Goal: Improved Thought Clarity/Organization (Psychotic Signs/Symptoms)  Outcome: Ongoing (interventions implemented as appropriate)      Problem: Psychomotor Movement Impairment (Psychotic Signs/Symptoms) (Adult)  Goal: Improved Psychomotor Symptoms (Psychotic Signs/Symptoms)  Outcome: Ongoing (interventions implemented as appropriate)      Problem: Mental State/Mood Impairment (Psychotic Signs/Symptoms) (Adult)  Goal: Improved Mental State/Mood (Psychotic Signs/Symptoms)  Outcome: Ongoing (interventions implemented as appropriate)      Problem: Self-expression Impairment (Psychotic Signs/Symptoms) (Adult)  Goal: Improved Self-Expression (Psychotic Signs/Symptoms)  Outcome:  Ongoing (interventions implemented as appropriate)      Problem: Sleep Impairment (Psychotic Signs/Symptoms) (Adult)  Goal: Improved Sleep Hygiene (Psychotic Signs/Symptoms)  Outcome: Ongoing (interventions implemented as appropriate)

## 2018-09-07 NOTE — PROGRESS NOTES
Continued Stay Note  [facility]     Patient Name: Britany Pruitt  MRN: [id number]  Today's Date: 9/7/2018    Admit Date: 8/13/2018          Discharge Plan     Row Name 09/07/18 1415       Plan    Plan Comments SW left voice message for pt's dtr/JEREMYA, Kim Logan [phone], providing a brief update on pt's progress, including monitoring of UTI in hopes of working towards disposition at [facility] home. [facility] home is continuing to follow pt while on [facility] for ongoing progress.               Discharge Codes    No documentation.           Jillian Acosta LCSW

## 2018-09-07 NOTE — PROGRESS NOTES
Adult Nutrition  Assessment/PES    Patient Name:  Britany Pruitt  YOB: 1930  MRN: [id number]  Admit Date:  8/13/2018    Assessment Date:  9/7/2018          Reason for Assessment     Row Name 09/07/18 1002          Reason for Assessment    Reason For Assessment follow-up protocol             Nutrition/Diet History     Row Name 09/07/18 1002          Nutrition/Diet History    Typical Food/Fluid Intake Intake low d/t GI issues (fecal impaction per CT). +nausea, +vomiting a few days ago.     Factors Affecting Nutritional Intake altered gastrointestinal function;impaired cognitive status/motor control               Labs/Tests/Procedures/Meds     Row Name 09/07/18 1004          Labs/Procedures/Meds    Lab Results Reviewed reviewed     Lab Results Comments K, Cl, Glu, BUN, Cr, PLTs, UTI         Diagnostic Tests/Procedures    Diagnostic Test/Procedure Reviewed reviewed     Diagnostic Test/Procedures Comments CT abd 9/5 -very large amount of formed stool distending the colon/rectum        Medications    Pertinent Medications Reviewed reviewed     Pertinent Medications Comments miralax, senokot, mvi/min, dulcolax, abx             Physical Findings     Row Name 09/07/18 1004          Physical Findings    Gastrointestinal abdominal distension;constipation;nausea   +BM 9/6     Skin other (see comments)   intact               Nutrition Prescription Ordered     Row Name 09/07/18 1005          Nutrition Prescription PO    Current PO Diet Regular     Fluid Consistency Thin     Supplement Ensure Enlive             Evaluation of Received Nutrient/Fluid Intake     Row Name 09/07/18 1005          PO Evaluation    Number of Days PO Intake Evaluated 3 days     % PO Intake 25% OR LESS           Problem/Interventions:          Intervention Goal     Row Name 09/07/18 1018          Intervention Goal    General Disease management/therapy;Reduce/improve symptoms     PO Increase intake     PO Intake % 75 %             Nutrition  Intervention     Row Name 09/07/18 1018          Nutrition Intervention    RD/Tech Action Menu provided;Encourage intake;Follow Tx progress               Education/Evaluation     Row Name 09/07/18 1018          Monitor/Evaluation    Monitor Per protocol         Electronically signed by:  Kate Davis RD  09/07/18 10:19 AM

## 2018-09-07 NOTE — PLAN OF CARE
Problem: Patient Care Overview  Goal: Plan of Care Review   09/07/18 1138   Plan of Care Review   Progress improving   OTHER   Outcome Summary In PT, the patient was able to ambulate slightly more than 2 days again but still with low endurance. Pt was cooperative in therapy.    Coping/Psychosocial   Plan of Care Reviewed With patient

## 2018-09-08 NOTE — PLAN OF CARE
Problem: Patient Care Overview  Goal: Plan of Care Review  Outcome: Ongoing (interventions implemented as appropriate)   09/07/18 2042   Plan of Care Review   Progress improving   OTHER   Outcome Summary PT spoke to staff intermittently and was drowsey and slept part of shift. [place] study to arms completed.  Tolerated sips of fluids without complication. Abdomin is distended. MOM and Suppository administered to promote BM. No impaction felt during insertion. IV fluids restarted at 1730 hrs. Will continue to monitor behavior and provide support.    Coping/Psychosocial   Plan of Care Reviewed With patient   Coping/Psychosocial   Patient Agreement with Plan of Care unable to participate     Goal: Individualization and Mutuality  Outcome: Ongoing (interventions implemented as appropriate)   09/07/18 2042   Personal Strengths/Vulnerabilities   Patient Personal Strengths medication/treatment adherence     Goal: Discharge Needs Assessment  Outcome: Ongoing (interventions implemented as appropriate)    Goal: Interprofessional Rounds/Family Conf  Outcome: Ongoing (interventions implemented as appropriate)      Problem: Overarching Goals (Adult)  Goal: Adheres to Safety Considerations for Self and Others  Outcome: Ongoing (interventions implemented as appropriate)    Goal: Optimized Coping Skills in Response to Life Stressors  Outcome: Ongoing (interventions implemented as appropriate)    Goal: Develops/Participates in Therapeutic [place] to Support Successful Transition  Outcome: Ongoing (interventions implemented as appropriate)

## 2018-09-08 NOTE — PROGRESS NOTES
Pharmacy to Dose Vancomycin    Patient: Britany Pruitt ([birth year], [id number]  LOS: 26 days )  Relevant clinical data and objective history reviewed:  88 y.o. female      There is no height or weight on file to calculate BMI.  she has a past medical history of Arthritis of back; Arthritis of spine (CMS/HCC); Back pain; Depression; and blood clots.    Day #2  Duration: 7 days  Consult for Dr Gallardo  Indication: uti  Current dose: 1000 mg IV q24h    Microbiology Results (last 10 days)     Procedure Component Value - Date/Time    Urine Culture - Urine, [[id number]]  (Abnormal) Collected:  18 1639    Lab Status:  Preliminary result Specimen:  Urine from Urine, Clean Catch Updated:  18 1057     Urine Culture >100,000 CFU/mL Gram Positive Cocci (A)        Other Abx: none    Results from last 7 days  Lab Units 18  0758 18  0848 18  0409   WBC 10*3/mm3 13.04* 14.28* 8.98   HEMOGLOBIN g/dL 15.0 13.5 13.5   HEMATOCRIT % 44.9 42.6 42.9   PLATELETS 10*3/mm3 632* 569* 666*  666*     Temp (24hrs), Av.7 °F (36.5 °C), Min:97.3 °F (36.3 °C), Max:98.1 °F (36.7 °C)    Serum creatinine: 0.37 mg/dL (L) 18 0640  Estimated creatinine clearance: 41.8 mL/min (A)    Assessment/Plan:     - Vancomycin 1000 mg IV q24h.   - Trough prior to the 4th dose (9/10 1430). Predicted 15-20.  - follow cultures    Daniel Gonzalez PharmD  18 8:47 AM

## 2018-09-08 NOTE — PROGRESS NOTES
The patient is now being treated for a gram-positive urinary tract infection with vancomycin and is receiving IV fluids.  Staff reports that she has had periods of responsiveness but is mute in this physician attempts to evaluate today.  I have spoken with Dr. Gallardo we agreed we will continue medical treatment for at least 1 further day but I will go ahead and ask that a palliative consult take place.

## 2018-09-08 NOTE — PLAN OF CARE
Problem: Patient Care Overview  Goal: Plan of Care Review  Outcome: Ongoing (interventions implemented as appropriate)   09/08/18 0400 09/08/18 0416   Plan of Care Review   Progress --  no change   OTHER   Outcome Summary --  Pt has remained confused and lethargic. No medications provided as the patient is too lethargic and weak to administer. Pt has produced moderate amount of loose stool per self. Sips of ensure have been encouraged. IV fluids remain running per order set. Will continue to monitor.    Coping/Psychosocial   Plan of Care Reviewed With patient --    Coping/Psychosocial   Patient Agreement with Plan of Care unable to participate --        Problem: Overarching Goals (Adult)  Goal: Adheres to Safety Considerations for Self and Others  Outcome: Ongoing (interventions implemented as appropriate)   09/08/18 0416   Overarching Goals (Adult)   Adheres to Safety Considerations for Self and Others making progress toward outcome     Intervention: Develop and Maintain Individualized Safety Plan   09/08/18 0200 09/08/18 0400   Violence Risk   Feels Like Hurting Others --  no   Previous Attempt to Harm Others --  no   C-SSRS (Recent)   Wish to be Dead --  no   Suicidal Thoughts --  no   Suicide Behavior --  no   Develop and Maintain Individualized Safety Plan   Safety Measures safety rounds completed --        Goal: Optimized Coping Skills in Response to Life Stressors  Outcome: Ongoing (interventions implemented as appropriate)   09/08/18 0416   Overarching Goals (Adult)   Optimized Coping Skills in Response to Life Stressors making progress toward outcome     Intervention: Promote Effective Coping Strategies   09/08/18 0400   Coping/Psychosocial Interventions   Supportive Measures active listening utilized;verbalization of feelings encouraged       Goal: Develops/Participates in Therapeutic [place] to Support Successful Transition  Outcome: Ongoing (interventions implemented as appropriate)   09/08/18 0416    Overarching Goals (Adult)   Develops/Participates in Therapeutic [place] to Support Successful Transition making progress toward outcome     Intervention: Foster Therapeutic [place]   09/08/18 0400   Interventions   Trust Relationship/Rapport care explained;thoughts/feelings acknowledged     Intervention: Mutually Develop Transition Plan   09/08/18 0400   Mutually Develop Transition Plan   Transition Support crisis management plan promoted         Problem: Cognitive Impairment (Psychotic Signs/Symptoms) (Adult)  Goal: Improved Thought Clarity/Organization (Psychotic Signs/Symptoms)  Outcome: Ongoing (interventions implemented as appropriate)   09/08/18 0416   Improved Thought Clarity/Organization (Psychotic Signs/Symptoms)   Improved Thought Clarity/Organization Time Frame for Action Step (STG) 3 days   Improved Thought Clarity/Organization Action Step (STG) Outcome making progress toward outcome       Problem: Psychomotor Movement Impairment (Psychotic Signs/Symptoms) (Adult)  Goal: Improved Psychomotor Symptoms (Psychotic Signs/Symptoms)  Outcome: Ongoing (interventions implemented as appropriate)   09/08/18 0416   Improved Psychomotor Symptoms (Psychotic Signs/Symptoms)   Improved Psychomotor Symptoms Time Frame for Action Step (STG) 3 days   Improved Psychomotor Symptoms Action Step (STG) Outcome making progress toward outcome       Problem: Mental State/Mood Impairment (Psychotic Signs/Symptoms) (Adult)  Goal: Improved Mental State/Mood (Psychotic Signs/Symptoms)  Outcome: Ongoing (interventions implemented as appropriate)   09/08/18 0416   Improved Mental State/Mood (Psychotic Signs/Symptoms)   Improved Mental State/Mood Time Frame for Action Step (STG) 3 days   Improved Mental State/Mood Action Step (STG) Outcome making progress toward outcome       Problem: Self-expression Impairment (Psychotic Signs/Symptoms) (Adult)  Goal: Improved Self-Expression (Psychotic Signs/Symptoms)  Outcome: Ongoing  (interventions implemented as appropriate)   09/08/18 0416   Improved Self-Expression (Psychotic Signs/Symptoms)   Improved Self-Expression Time Frame for Action Step (STG) 3 days   Improved Self-Expression Action Step (STG) Outcome making progress toward outcome       Problem: Sleep Impairment (Psychotic Signs/Symptoms) (Adult)  Goal: Improved Sleep Hygiene (Psychotic Signs/Symptoms)  Outcome: Ongoing (interventions implemented as appropriate)   09/08/18 0416   Improved Sleep Hygiene (Psychotic Signs/Symptoms)   Improved Sleep Hygiene Time Frame for Action Step (STG) 3 days   Improved Sleep Hygiene Action Step (STG) Outcome making progress toward outcome       Problem: Fall Risk (Adult)  Goal: Absence of Fall  Outcome: Ongoing (interventions implemented as appropriate)   09/08/18 0416   Fall Risk (Adult)   Absence of Fall making progress toward outcome

## 2018-09-08 NOTE — PROGRESS NOTES
DAILY PROGRESS NOTE  [facility]    Patient Identification:  Name: Britany Pruitt  Age: 88 y.o.  Sex: female  :  1930  MRN: [id number]         Primary Care Physician: Jerome Moore Jr., MD    Subjective:  Interval History: Talking very little  today. Less Sedated. Poor PO intake. Not much change.    Objective:    Scheduled Meds:    acetaminophen 1,300 mg Oral Daily   ARIPiprazole 5 mg Oral Nightly   bisacodyl 10 mg Rectal Daily   buPROPion 75 mg Oral TID   multivitamin 1 tablet Oral Daily   polyethylene glycol 17 g Oral Daily   sennosides-docusate sodium 2 tablet Oral Nightly   vancomycin 1,000 mg Intravenous Q24H     Continuous Infusions:    dextrose 5 % and sodium chloride 0.45 % with KCl 10 mEq/L 75 mL/hr Last Rate: 75 mL/hr (18 1044)   Pharmacy to dose vancomycin         Vital signs in last 24 hours:  Temp:  [97.3 °F (36.3 °C)-98.1 °F (36.7 °C)] 97.3 °F (36.3 °C)  Heart Rate:  [] 98  Resp:  [16-24] 24  BP: (130-156)/(68-94) 130/83    Intake/Output:    Intake/Output Summary (Last 24 hours) at 18 1457  Last data filed at 18 1044   Gross per 24 hour   Intake              900 ml   Output                0 ml   Net              900 ml       Exam:  /83 (BP Location: Right arm, Patient Position: Lying)   Pulse 98   Temp 97.3 °F (36.3 °C) (Axillary)   Resp 24   SpO2 94%     General Appearance:    Not talking, no distress   Head:    Normocephalic, without obvious abnormality, atraumatic   Eyes:       Throat:   Lips, tongue, gums normal   Neck:   Supple, symmetrical, trachea midline, no JVD   Lungs:     Clear to auscultation bilaterally, respirations unlabored   Chest Wall:    No tenderness or deformity    Heart:    Regular rate and rhythm, S1 and S2 normal, no murmur,no  Rub or gallop   Abdomen:     Soft, non-tender, bowel sounds active, no masses, no organomegaly    Extremities:   Extremities normal, atraumatic, no cyanosis or edema   Pulses:      Skin:    Skin is warm and dry,  no rashes or palpable lesions   Neurologic:   no focal deficits noted, demented and not talking.      Lab Results (last 72 hours)     Procedure Component Value Units Date/Time    Protime-INR [[id number]]  (Abnormal) Collected:  09/03/18 0735    Specimen:  Blood Updated:  09/03/18 0839     Protime 28.8 (H) Seconds      INR 2.77 (H)    Protime-INR [[id number]]  (Abnormal) Collected:  09/02/18 1011    Specimen:  Blood Updated:  09/02/18 1038     Protime 23.6 (H) Seconds      INR 2.14 (H)    POC Glucose Once [[id number]]  (Normal) Collected:  09/02/18 1016    Specimen:  Blood Updated:  09/02/18 1017     Glucose 107 mg/dL     Narrative:       Meter: [id number] : [id number] Emil Stein RN    Protime-INR [[id number]]  (Abnormal) Collected:  09/01/18 0810    Specimen:  Blood Updated:  09/01/18 0910     Protime 22.6 (H) Seconds      INR 2.03 (H)        Data Review:    Results from last 7 days  Lab Units 09/08/18  0640 09/07/18  0758 09/06/18  0800   SODIUM mmol/L 139 138 136   POTASSIUM mmol/L 3.9 3.3* 3.3*   CHLORIDE mmol/L 96* 93* 93*   CO2 mmol/L 31.0* 33.6* 28.2   BUN mg/dL 23 30* 34*   CREATININE mg/dL 0.40*  0.37* 0.53* 0.49*   GLUCOSE mg/dL 158* 161* 167*   CALCIUM mg/dL 8.9 9.9 9.8       Results from last 7 days  Lab Units 09/08/18  1211 09/07/18  0758 09/06/18  0848   WBC 10*3/mm3 10.53 13.04* 14.28*   HEMOGLOBIN g/dL 13.2 15.0 13.5   HEMATOCRIT % 42.1 44.9 42.6   PLATELETS 10*3/mm3 564* 632* 569*       Results from last 7 days  Lab Units 09/05/18  0409   TSH mIU/mL 0.029*   T3 FREE pg/mL 3.53   FREE T4 ng/dL 1.52           Lab Results  Lab Value Date/Time   TROPONINT <0.010 08/13/2018 1209               Invalid input(s): PROT, LABALBU    Results from last 7 days  Lab Units 09/05/18  0409   TSH mIU/mL 0.029*   T3 FREE pg/mL 3.53   FREE T4 ng/dL 1.52         No results found for: POCGLU    Results from last 7 days  Lab Units 09/08/18  0640 09/07/18  0758 09/06/18  0800   INR  1.91* 1.96*  2.02*       Patient Active Problem List   Diagnosis Code   • Lumbar compression fracture (CMS/Formerly [facility]) S32.000A   • Closed nondisplaced intertrochanteric fracture of left femur (CMS/Formerly [facility]) S72.145A   • History of DVT (deep vein thrombosis) Z86.718   • Chronic anticoagulation Z79.01   • Opiates and related narcotics causing adverse effect T40.605A   • Fall W19.XXXA   • Dementia with behavioral disturbance F03.91   • Depression F32.9   • Arthritis of spine (CMS/Formerly [facility]) M46.90   • UTI (urinary tract infection) N39.0   • Fecal impaction (CMS/Formerly [facility]) K56.41       Assessment:  Active Hospital Problems    Diagnosis Date Noted   • **Dementia with behavioral disturbance [F03.91] 08/13/2018   • UTI (urinary tract infection) [N39.0] 09/06/2018   • Fecal impaction (CMS/[facility]) [K56.41] 09/06/2018   • Depression [F32.9] 08/13/2018   • Arthritis of spine (CMS/Former[facility]) [M46.90] 08/13/2018   • History of DVT (deep vein thrombosis) [Z86.718] 03/22/2017   • Chronic anticoagulation [Z79.01] 03/22/2017      Resolved Hospital Problems    Diagnosis Date Noted Date Resolved   No resolved problems to display.       Plan:  Will check  more labs tomorrow. Will follow.  Continue some IV fluids if PO intake not improving. Discussed with nurse to encourage more fluid intake and try to get her to eat.  Continue antibiotics change to vancomycin and await  Culture.  Replace K.  RX for constipation  Leiter??  Poor. Agree with plans for palliative care consult. Discussed with Dr Garcia and nurse.  Raoul Gallardo MD  9/8/2018  2:57 PM

## 2018-09-08 NOTE — PLAN OF CARE
Problem: Patient Care Overview  Goal: Plan of Care Review  Outcome: Ongoing (interventions implemented as appropriate)   09/08/18 0957 09/08/18 1759   Plan of Care Review   Progress --  declining   OTHER   Outcome Summary --  Pt continues to exhibit confusion and lethargy. Pt alternates between periods of lucidity and periods of catatonia. Pt was oriented to self and place. Attempts were made at feeding pt. However, pt would not swallow food or would clench teeth, not allowing staff to feed her. Pt was able to take oral medications whole with juice. Pt sat on bedside commode and spent some time up in chair out in lounge. IV fluids still infusing. Fall precautions remain in place. Will continue to monitor and provide support.   Coping/Psychosocial   Plan of Care Reviewed With patient --    Coping/Psychosocial   Patient Agreement with Plan of Care unable to participate --        Problem: Overarching Goals (Adult)  Goal: Adheres to Safety Considerations for Self and Others  Outcome: Ongoing (interventions implemented as appropriate)   09/08/18 1759   Overarching Goals (Adult)   Adheres to Safety Considerations for Self and Others making progress toward outcome     Intervention: Develop and Maintain Individualized Safety Plan   09/08/18 0957 09/08/18 1600   Violence Risk   Feels Like Hurting Others no --    Previous Attempt to Harm Others no --    C-SSRS (Recent)   Wish to be Dead no --    Suicidal Thoughts no --    Suicide Behavior no --    Develop and Maintain Individualized Safety Plan   Safety Measures --  safety rounds completed       Goal: Optimized Coping Skills in Response to Life Stressors  Outcome: Ongoing (interventions implemented as appropriate)   09/08/18 1759   Overarching Goals (Adult)   Optimized Coping Skills in Response to Life Stressors making progress toward outcome     Intervention: Promote Effective Coping Strategies   09/08/18 0957   Coping/Psychosocial Interventions   Supportive Measures active  listening utilized;verbalization of feelings encouraged       Goal: Develops/Participates in Therapeutic [place] to Support Successful Transition  Outcome: Ongoing (interventions implemented as appropriate)   09/08/18 1759   Overarching Goals (Adult)   Develops/Participates in Therapeutic [place] to Support Successful Transition making progress toward outcome     Intervention: Foster Therapeutic [place]   09/08/18 0957   Interventions   Trust Relationship/Rapport care explained;choices provided;emotional support provided;empathic listening provided;questions answered;questions encouraged;reassurance provided;thoughts/feelings acknowledged     Intervention: Mutually Develop Transition Plan   09/08/18 0957   Mutually Develop Transition Plan   Transition Support crisis management plan promoted         Problem: Cognitive Impairment (Psychotic Signs/Symptoms) (Adult)  Intervention: Promote Thought Clarity and Organization   08/22/18 1546   Promote Thought Clarity and Organization   Mutually Determined Action Steps (Promote Thought Clarity/Organization) remains focused during activity       Goal: Improved Thought Clarity/Organization (Psychotic Signs/Symptoms)  Outcome: Ongoing (interventions implemented as appropriate)   09/07/18 1026 09/08/18 1759   Improved Thought Clarity/Organization (Psychotic Signs/Symptoms)   Improved Thought Clarity/Organization Action Step/Short Term Goal (STG) Established 09/07/18 --    Improved Thought Clarity/Organization Time Frame for Action Step (STG) --  3 days   Improved Thought Clarity/Organization Action Step (STG) Outcome --  making progress toward outcome       Problem: Psychomotor Movement Impairment (Psychotic Signs/Symptoms) (Adult)  Intervention: Manage Psychomotor Movement   08/22/18 1546   Manage Psychomotor Movement   Mutually Determined Action Steps (Manage Psychomotor Movement) exhibits decrease in agitation;adheres to medication regimen       Goal: Improved Psychomotor  Symptoms (Psychotic Signs/Symptoms)  Outcome: Ongoing (interventions implemented as appropriate)   09/07/18 1026 09/08/18 1759   Improved Psychomotor Symptoms (Psychotic Signs/Symptoms)   Improved Psychomotor Symptoms Action Step/Short Term Goal (STG) Established 09/07/18 --    Improved Psychomotor Symptoms Time Frame for Action Step (STG) --  3 days   Improved Psychomotor Symptoms Action Step (STG) Outcome --  making progress toward outcome       Problem: Mental State/Mood Impairment (Psychotic Signs/Symptoms) (Adult)  Intervention: Optimize Mental State and Mood   08/22/18 1546   Optimize Mental State and Mood   Mutually Determined Action Steps (Optimize Mental State/Mood) verbalizes increased insight       Goal: Improved Mental State/Mood (Psychotic Signs/Symptoms)  Outcome: Ongoing (interventions implemented as appropriate)   09/07/18 1026 09/08/18 1759   Improved Mental State/Mood (Psychotic Signs/Symptoms)   Improved Mental State/Mood Action Step/Short Term Goal (STG) Established 09/07/18 --    Improved Mental State/Mood Time Frame for Action Step (STG) --  3 days   Improved Mental State/Mood Action Step (STG) Outcome --  making progress toward outcome       Problem: Self-expression Impairment (Psychotic Signs/Symptoms) (Adult)  Intervention: Encourage Positive Self-Expression   08/22/18 1546   Encourage Positive Self-Expression   Mutually Determined Action Steps (Encourage Positive Self-Expression) communicates needs verbally       Goal: Improved Self-Expression (Psychotic Signs/Symptoms)  Outcome: Ongoing (interventions implemented as appropriate)   09/07/18 1026 09/08/18 1759   Improved Self-Expression (Psychotic Signs/Symptoms)   Improved Self-Expression Action Step/Short Term Goal (STG) Established 09/07/18 --    Improved Self-Expression Time Frame for Action Step (STG) --  3 days   Improved Self-Expression Action Step (STG) Outcome --  making progress toward outcome       Problem: Sleep Impairment  (Psychotic Signs/Symptoms) (Adult)  Intervention: Promote Healthy Sleep Hygiene   09/05/18 1647   Promote Healthy Sleep Hygiene   Mutually Determined Action Steps (Promote Healthy Sleep Hygiene) sleeps 4-6 hours at night       Goal: Improved Sleep Hygiene (Psychotic Signs/Symptoms)  Outcome: Ongoing (interventions implemented as appropriate)   09/07/18 1026 09/08/18 1759   Improved Sleep Hygiene (Psychotic Signs/Symptoms)   Improved Sleep Hygiene Action Step/Short Term Goal (STG) Established 09/07/18 --    Improved Sleep Hygiene Time Frame for Action Step (STG) --  3 days   Improved Sleep Hygiene Action Step (STG) Outcome --  making progress toward outcome       Problem: Fall Risk (Adult)  Intervention: Monitor/Assist with Self Care   09/08/18 0957   Activity   Activity Assistance Provided assistance, 1 person   Daily Care Interventions   Self-Care Promotion independence encouraged;meal setup provided     Intervention: Reduce Risk/Promote Restraint Free Environment   09/08/18 1600 09/08/18 1700   Safety Management   Environmental Safety Modification --  clutter free environment maintained;room near unit station;room organization consistent   Safety Promotion/Fall Prevention nonskid shoes/slippers when out of bed;safety round/check completed;fall prevention program maintained --      Intervention: Review Medications/Identify Contributors to Fall Risk   09/08/18 1600   Safety Management   Medication Review/Management medications reviewed       Goal: Absence of Fall  Outcome: Ongoing (interventions implemented as appropriate)   09/08/18 1759   Fall Risk (Adult)   Absence of Fall making progress toward outcome       Problem: Skin Injury Risk (Adult)  Intervention: Promote/Optimize Nutrition   09/08/18 0957   Nutrition Interventions   Oral Nutrition Promotion medicated;calorie dense liquids provided     Intervention: Prevent/Manage Excess Moisture   09/08/18 0957 09/08/18 1200   Hygiene Care   Perineal Care --  perineum  cleansed   Bathing/Skin Care --  (bedbath)   Skin Interventions   Skin Protection incontinence pads utilized --      Intervention: Maintain Head of Bed Elevation Less Than 30 Degrees as Tolerated   09/08/18 1152   Positioning   Head of Bed (HOB) HOB at 20-30 degrees     Intervention: Prevent/Minimize Shear/Friction Injuries   09/08/18 0957 09/08/18 1152   Positioning   Positioning/Transfer Devices --  pillows;in use   Skin Interventions   Pressure Reduction Devices pressure-redistributing mattress utilized;elbow protectors utilized --      Intervention: Prevent or Minimize Pressure   09/08/18 0957 09/08/18 1152   Positioning   Body Position --  side-lying, right   Skin Interventions   Pressure Reduction Techniques frequent weight shift encouraged;weight shift assistance provided;pressure points protected --        Goal: Skin Health and Integrity  Outcome: Ongoing (interventions implemented as appropriate)   09/07/18 0353   Skin Injury Risk (Adult)   Skin Health and Integrity making progress toward outcome

## 2018-09-09 PROBLEM — E87.6 HYPOKALEMIA: Status: ACTIVE | Noted: 2018-01-01

## 2018-09-09 NOTE — DISCHARGE SUMMARY
DATES OF ADMISSION: 8/13/2018-9/8/2018    REASON FOR ADMISSION: The patient is an 88-year-old white female admitted to the [facility] and what appear to be a manic phase of bipolar disorder.    LABS:  See chart  Field    HOSPITAL COURSE:  The patient was admitted to the crisis management unit and placed on Pruitt 2 programming.  She was continued on Zyprexa with dosage increased to 10 mg and when necessary Zyprexa was also continued.  The patient was initially continued on Prozac and Wellbutrin but Prozac was withdrawn at the family's request.  The patient was initially quite irritable and paranoid and refused to comply with neuropsychological evaluation.  The patient had reportedly had a history of cycling from min into depression and in fact we did see this pattern occurs the patient's manic symptoms seemed to resolve she began becoming more withdrawn but was psychotic responding to internal stimuli and experiencing periods of mutism.  Her by mouth intake was sleeping poorly reduced, and, getting matters was a severe urinary check infection which required IV antibiotics.  This was physician discussed with Dr. Gallardo the possibility that the patient's deteriorating mental status and health status are indicative possible end-of-life issues, and a palliative consult was ordered.  Finally, the patient's medical condition had deteriorated to a point where transfer to the main hospital was felt necessary.  It was ordered take place on on a 2018.      FINAL DIAGNOSIS:  Primary dementia Alzheimer's type with behavioral disturbance, bipolar disorder most recent episode mixed, urinary tract infection    DISPOSITION ON DISCHARGE:  The patient is transferred to the main hospital.  A full listing of her medications is provided below.    PROGNOSIS: Guarded       Discharge Medications      ASK your doctor about these medications      Instructions Start Date   buPROPion 75 MG tablet  Commonly known as:  WELLBUTRIN   75 mg, Oral, 3 Times  Daily      FLUoxetine 20 MG tablet  Commonly known as:  PROzac   20 mg, Oral, 3 Times Weekly, TAKES 3DAYS WEEKLY(MWF)      furosemide 20 MG tablet  Commonly known as:  LASIX   20 mg, Oral, Daily      LORazepam 0.5 MG tablet  Commonly known as:  ATIVAN   0.5 mg, Oral, Every Night at Bedtime      ATIVAN 0.5 MG tablet  Generic drug:  LORazepam   0.25 mg, Oral, 2 Times Daily      OLANZapine 5 MG tablet  Commonly known as:  zyPREXA   5 mg, Oral, Nightly      TYLENOL ARTHRITIS PAIN 650 MG 8 hr tablet  Generic drug:  acetaminophen   1,300 mg, Oral, Daily      warfarin 5 MG tablet  Commonly known as:  COUMADIN   5 mg, Oral, Daily Warfarin, protime checked last week

## 2018-09-10 NOTE — PROGRESS NOTES
Continued Stay Note  [facility]     Patient Name: Britany Pruitt  MRN: [id number]  Today's Date: 9/10/2018    Admit Date: 8/13/2018          Discharge Plan     Row Name 09/10/18 0755       Plan    Final Discharge Disposition Code 02 - short term hospital for  care    Final Note Pt was transferred to the main hospital at [facility] over the weekend on 09/09 to address ongoing medical issues. Pt's daughter/POA, Kim Logan, has been notified of this transfer, according to progress notes. [facility] continues to follow the pt for their skilled, memory care.               Discharge Codes    No documentation.       Expected Discharge Date and Time     Expected Discharge Date Expected Discharge Time    Sep 9, 2018  1:16 AM            Jillian Acosta LCSW

## 2018-09-14 PROBLEM — Z51.5 PALLIATIVE CARE ENCOUNTER: Status: ACTIVE | Noted: 2018-01-01
